# Patient Record
Sex: FEMALE | Race: WHITE | NOT HISPANIC OR LATINO | Employment: OTHER | URBAN - METROPOLITAN AREA
[De-identification: names, ages, dates, MRNs, and addresses within clinical notes are randomized per-mention and may not be internally consistent; named-entity substitution may affect disease eponyms.]

---

## 2017-01-03 ENCOUNTER — OFFICE VISIT (OUTPATIENT)
Dept: RHEUMATOLOGY | Facility: CLINIC | Age: 57
End: 2017-01-03
Payer: COMMERCIAL

## 2017-01-03 VITALS
WEIGHT: 149 LBS | SYSTOLIC BLOOD PRESSURE: 108 MMHG | HEART RATE: 75 BPM | DIASTOLIC BLOOD PRESSURE: 68 MMHG | BODY MASS INDEX: 27.25 KG/M2

## 2017-01-03 DIAGNOSIS — M05.742 RHEUMATOID ARTHRITIS INVOLVING BOTH HANDS WITH POSITIVE RHEUMATOID FACTOR (H): Primary | ICD-10-CM

## 2017-01-03 DIAGNOSIS — M05.741 RHEUMATOID ARTHRITIS INVOLVING BOTH HANDS WITH POSITIVE RHEUMATOID FACTOR (H): Primary | ICD-10-CM

## 2017-01-03 PROCEDURE — 99213 OFFICE O/P EST LOW 20 MIN: CPT | Performed by: INTERNAL MEDICINE

## 2017-01-03 NOTE — PROGRESS NOTES
"Chief Complaint   Patient presents with     RECHECK       Initial /68 mmHg  Pulse 75  Wt 67.586 kg (149 lb) Estimated body mass index is 27.25 kg/(m^2) as calculated from the following:    Height as of 12/20/16: 1.575 m (5' 2\").    Weight as of this encounter: 67.586 kg (149 lb).      Patient prefers to be contacted via at Home.   Okay to leave detailed message: Yes  Patient uses MyChart: Yes    Anita GRIGSBY LPN      "

## 2017-01-03 NOTE — PROGRESS NOTES
HISTORY:  Ms. Mae Connors is seen back in followup for her seropositive rheumatoid arthritis.  She is on methotrexate 20 mg weekly and denies any side effects of nausea, vomiting, diarrhea, or stomatitis.  There has not been any progressive dyspnea or chronic cough.  Overall, she feels better but admits she has not noticed much change in the stiffness, which is still present in the morning.  However, she admits that she does not hurt and that her hands do feel different and better.      We also discussed the fact she does have some osteoarthritis and differentiate this from rheumatoid arthritis as well as treatment approaches, etc.      PHYSICAL EXAMINATION:  Blood pressure 108/60, pulse 75, weight 149.  There may still be trace synovitis of the left wrist, but there is none of the right wrist nor the MCPs or PIPs.  She does clearly have some Heberden's nodules at the bilateral second, third and fifth DIP joints and Jo Ann's nodules at the bilateral third PIP joints.  There is no synovitis of the elbows, shoulders, knees or ankles.  Skin is without lesions.      IMPRESSION:   1.  Seropositive rheumatoid arthritis.   2.  Generalized osteoarthritis.      RECOMMENDATIONS:   1.  Continue methotrexate 20 mg weekly as I think she has had a good effect and I am not certain we can do much better.   2.  Continue folic acid.   3.  Check methotrexate labs today.   4.  Treatment for osteoarthritis is basically palliative and involves the use of Tylenol, NSAIDs, and analgesics.  Also discussed nutritional supplements that can be mildly helpful at times.   5.  Follow up with me in 3 months or sooner if there are problems.         TYLER MOLINA MD             D: 2017 13:03   T: 2017 13:24   MT: [id number]      Name:     MAE CONNORS   MRN:      [id number]        Account:      [id number]   :      1960           Visit Date:   2017      Document: [id number]

## 2017-01-03 NOTE — MR AVS SNAPSHOT
After Visit Summary   1/3/2017    Mae Connors    MRN: [id number]           Patient Information     Date Of Birth          1960        Visit Information        Provider Department      1/3/2017 8:15 AM Felipe Reyna MD [facility]        Today's Diagnoses     Rheumatoid arthritis involving both hands with positive rheumatoid factor (H)    -  1        Follow-ups after your visit        Follow-up notes from your care team     Return in about 3 months (around 4/3/2017).      Your next 10 appointments already scheduled     Apr 04, 2017  7:30 AM   Return Visit with Felipe Reyna MD   [facility] ([facility])    [address], [address]  [facility] [address] [phone]           Please inform patient of the clinic address: [facility] [address] , [address] [facility] [address]              Future tests that were ordered for you today     Open Standing Orders        Priority Remaining Interval Expires Ordered    Centromere Antibody IgG Routine 4/4 12 weeks 1/3/2018 1/3/2017    Comprehensive metabolic panel Routine 4/4 12 weeks 1/2/2018 1/3/2017            Who to contact     If you have questions or need follow up information about today's clinic visit or your schedule please contact [facility] directly at [phone].  Normal or non-critical lab and imaging results will be communicated to you by MyChart, letter or phone within 4 business days after the clinic has received the results. If you do not hear from us within 7 days, please contact the clinic through MyChart or phone. If you have a critical or abnormal lab result, we will notify you by phone as soon as possible.  Submit refill requests through Formlabs or call your pharmacy and they will forward the refill request to us. Please allow 3 business days for your refill to be completed.           Additional Information About Your Visit        MyChart Information     Circular gives you secure access to your electronic health record. If you see a primary care provider, you can also send messages to your care team and make appointments. If you have questions, please call your primary care clinic.  If you do not have a primary care provider, please call [phone] and they will assist you.        Care EveryWhere ID     This is your Care EveryWhere ID. This could be used by other organizations to access your [place] medical records  [id number]        Your Vitals Were     Pulse                   75            Blood Pressure from Last 3 Encounters:   01/03/17 108/68   12/20/16 100/70   08/18/16 116/71    Weight from Last 3 Encounters:   01/03/17 67.586 kg (149 lb)   12/20/16 67.132 kg (148 lb)   10/06/16 65.772 kg (145 lb)                 Today's Medication Changes          These changes are accurate as of: 1/3/17  3:13 PM.  If you have any questions, ask your nurse or doctor.               These medicines have changed or have updated prescriptions.        Dose/Directions    methotrexate 2.5 MG tablet CHEMO   This may have changed:  how much to take   Used for:  Rheumatoid arthritis involving both hands with positive rheumatoid factor (H)        Dose:  20 mg   Take 8 tablets (20 mg) by mouth once a week   Quantity:  40 tablet   Refills:  3            Where to get your medicines      These medications were sent to [id number] - [place], [place] - [address]  [address], [facility] [address]     Phone:  [phone]    - methotrexate 2.5 MG tablet CHEMO             Primary Care Provider Office Phone # Fax #    Yasmine Richards [phone]       [facility] [address] [place] [address]        Thank you!     Thank you for choosing [facility]  for your care. Our goal is always to provide you  with excellent care. Hearing back from our patients is one way we can continue to improve our services. Please take a few minutes to complete the written survey that you may receive in the mail after your visit with us. Thank you!             Your Updated Medication List - Protect others around you: Learn how to safely use, store and throw away your medicines at www.disposemymeds.org.          This list is accurate as of: 1/3/17  3:13 PM.  Always use your most recent med list.                   Brand Name Dispense Instructions for use    BUDESONIDE (NASAL) NA      Miles in nostril daily 1 VIAL IN BETH POT       buPROPion 150 MG 24 hr tablet    WELLBUTRIN XL    180 tablet    Take 1 tablet (150 mg) by mouth every morning       cetirizine 10 MG tablet    zyrTEC    90 tablet    Take 1 tablet by mouth every evening.       folic acid 1 MG tablet    FOLVITE    100 tablet    Take 1 tablet (1 mg) by mouth daily       levothyroxine 100 MCG tablet    LEVOTHROID    90 tablet    Take 1 tablet (100 mcg) by mouth daily       methotrexate 2.5 MG tablet CHEMO     40 tablet    Take 8 tablets (20 mg) by mouth once a week       naproxen 500 MG tablet    NAPROSYN    60 tablet    Take 1 tablet (500 mg) by mouth 2 times daily as needed for moderate pain

## 2017-02-09 DIAGNOSIS — M05.741 RHEUMATOID ARTHRITIS INVOLVING BOTH HANDS WITH POSITIVE RHEUMATOID FACTOR (H): ICD-10-CM

## 2017-02-09 DIAGNOSIS — E03.8 OTHER SPECIFIED HYPOTHYROIDISM: ICD-10-CM

## 2017-02-09 DIAGNOSIS — M05.742 RHEUMATOID ARTHRITIS INVOLVING BOTH HANDS WITH POSITIVE RHEUMATOID FACTOR (H): ICD-10-CM

## 2017-02-09 PROCEDURE — 84443 ASSAY THYROID STIM HORMONE: CPT | Performed by: INTERNAL MEDICINE

## 2017-02-09 PROCEDURE — 36415 COLL VENOUS BLD VENIPUNCTURE: CPT | Performed by: INTERNAL MEDICINE

## 2017-02-09 PROCEDURE — 84439 ASSAY OF FREE THYROXINE: CPT | Performed by: INTERNAL MEDICINE

## 2017-02-09 PROCEDURE — 80053 COMPREHEN METABOLIC PANEL: CPT | Performed by: INTERNAL MEDICINE

## 2017-02-09 PROCEDURE — 86235 NUCLEAR ANTIGEN ANTIBODY: CPT | Performed by: INTERNAL MEDICINE

## 2017-02-09 NOTE — LETTER
[facility]  [address]  [place] [address]  [phone]      June 22, 2017      Mae Connors  [address] [address]              Dear Mae Connors    This is to remind you that your TSH is due. You may call our office at [phone] to schedule a lab only appointment.    Please disregard this notice if you have had this lab work done or already made an appointment.     Note: If you are scheduled for a cholesterol or any diabetic lab tests, you will need to fast for 12 hours prior to your lab appointment.        Sincerely,    Yasmine Richards MD

## 2017-02-10 LAB
ALBUMIN SERPL-MCNC: 4.1 G/DL (ref 3.4–5)
ALP SERPL-CCNC: 67 U/L (ref 40–150)
ALT SERPL W P-5'-P-CCNC: 39 U/L (ref 0–50)
ANION GAP SERPL CALCULATED.3IONS-SCNC: 8 MMOL/L (ref 3–14)
AST SERPL W P-5'-P-CCNC: 27 U/L (ref 0–45)
BILIRUB SERPL-MCNC: 0.3 MG/DL (ref 0.2–1.3)
BUN SERPL-MCNC: 17 MG/DL (ref 7–30)
CALCIUM SERPL-MCNC: 9.3 MG/DL (ref 8.5–10.1)
CHLORIDE SERPL-SCNC: 106 MMOL/L (ref 94–109)
CO2 SERPL-SCNC: 29 MMOL/L (ref 20–32)
CREAT SERPL-MCNC: 0.85 MG/DL (ref 0.52–1.04)
GFR SERPL CREATININE-BSD FRML MDRD: 69 ML/MIN/1.7M2
GLUCOSE SERPL-MCNC: 79 MG/DL (ref 70–99)
POTASSIUM SERPL-SCNC: 3.7 MMOL/L (ref 3.4–5.3)
PROT SERPL-MCNC: 7.8 G/DL (ref 6.8–8.8)
SODIUM SERPL-SCNC: 143 MMOL/L (ref 133–144)
T4 FREE SERPL-MCNC: 1.18 NG/DL (ref 0.76–1.46)
TSH SERPL DL<=0.005 MIU/L-ACNC: 0.05 MU/L (ref 0.4–4)

## 2017-02-10 RX ORDER — LEVOTHYROXINE SODIUM 88 UG/1
88 TABLET ORAL DAILY
Qty: 30 TABLET | Refills: 1 | Status: SHIPPED | OUTPATIENT
Start: 2017-02-10 | End: 2017-07-10

## 2017-02-13 LAB — CENTROMERE IGG SER-ACNC: NORMAL AI (ref 0–0.9)

## 2017-04-04 ENCOUNTER — OFFICE VISIT (OUTPATIENT)
Dept: RHEUMATOLOGY | Facility: CLINIC | Age: 57
End: 2017-04-04
Payer: COMMERCIAL

## 2017-04-04 VITALS — WEIGHT: 151 LBS | HEART RATE: 83 BPM | BODY MASS INDEX: 27.62 KG/M2

## 2017-04-04 DIAGNOSIS — M05.741 RHEUMATOID ARTHRITIS INVOLVING BOTH HANDS WITH POSITIVE RHEUMATOID FACTOR (H): ICD-10-CM

## 2017-04-04 DIAGNOSIS — M05.742 RHEUMATOID ARTHRITIS INVOLVING BOTH HANDS WITH POSITIVE RHEUMATOID FACTOR (H): ICD-10-CM

## 2017-04-04 PROCEDURE — 99213 OFFICE O/P EST LOW 20 MIN: CPT | Performed by: INTERNAL MEDICINE

## 2017-04-04 NOTE — NURSING NOTE
"Chief Complaint   Patient presents with     RECHECK   states she is doing well.    Initial Pulse 83  Wt 68.5 kg (151 lb)  BMI 27.62 kg/m2 Estimated body mass index is 27.62 kg/(m^2) as calculated from the following:    Height as of 12/20/16: 1.575 m (5' 2\").    Weight as of this encounter: 68.5 kg (151 lb).      Patient prefers to be contacted via at Home.   Okay to leave detailed message: Yes  Patient uses MyChart: Yes    Anita GRIGSBY LPN    "

## 2017-04-04 NOTE — PROGRESS NOTES
HISTORY:  Ms. Bharat Connors is seen back in follow up for her rheumatoid arthritis.  She is doing well.  She denies any side effects of methotrexate such as nausea, vomiting, diarrhea, stomatitis.  There is no progressive dyspnea or chronic cough.  She is just a little bit stiff in her hands throughout the day, but overall thinks things are going well.  At this point, I do not think that anything else needs to be changed or done differently at this point.        PHYSICAL EXAMINATION:    VITAL SIGNS:  Pulse is 83, weight is 151.     LUNGS:  Clear.    HEART:  Regular.     JOINT EXAM:  There still may be some mild synovitis of the wrist that she mentions; she is having intermittent numbness in her right hand.  There is no synovitis of the MCPs or PIPs.     SKIN:  Without lesions.       IMPRESSION:  Seropositive rheumatoid arthritis.        RECOMMENDATIONS:    1.  If the numbness in the hands gets worse to let me know.     2.  Continue methotrexate 20 mg weekly.    3.  Continue folic acid.    4.  Follow up with me in 3 months and check labs at that time.         TYLER MOLINA MD             D: 2017 12:48   T: 2017 13:05   MT: ALO#[id number]      Name:     BHARAT CONNORS   MRN:      [id number]        Account:      [id number]   :      1960           Visit Date:   2017      Document: [id number]

## 2017-04-04 NOTE — MR AVS SNAPSHOT
After Visit Summary   4/4/2017    Mae Connors    MRN: [id number]           Patient Information     Date Of Birth          1960        Visit Information        Provider Department      4/4/2017 7:30 AM Felipe Reyna MD [facility]        Today's Diagnoses     Rheumatoid arthritis involving both hands with positive rheumatoid factor (H)           Follow-ups after your visit        Your next 10 appointments already scheduled     Jul 11, 2017  7:30 AM CDT   Return Visit with Felipe Reyna MD   [facility] ([facility])    [address] [facility] [address]  [facility] [id number] [phone]           Please inform patient of the clinic address: [facility] [address] , [facility] [address] [facility] [address]              Future tests that were ordered for you today     Open Standing Orders        Priority Remaining Interval Expires Ordered    CBC with platelets differential Routine 4/4 12 weeks 4/4/2018 4/4/2017    Comprehensive metabolic panel Routine 4/4 12 weeks 4/4/2018 4/4/2017            Who to contact     If you have questions or need follow up information about today's clinic visit or your schedule please contact [facility] directly at [phone].  Normal or non-critical lab and imaging results will be communicated to you by MyChart, letter or phone within 4 business days after the clinic has received the results. If you do not hear from us within 7 days, please contact the clinic through MyChart or phone. If you have a critical or abnormal lab result, we will notify you by phone as soon as possible.  Submit refill requests through Spreedly or call your pharmacy and they will forward the refill request to us. Please allow 3 business days for your refill to be completed.          Additional Information About Your Visit        MyChart Information      Shippter gives you secure access to your electronic health record. If you see a primary care provider, you can also send messages to your care team and make appointments. If you have questions, please call your primary care clinic.  If you do not have a primary care provider, please call [phone] and they will assist you.        Care EveryWhere ID     This is your Care EveryWhere ID. This could be used by other organizations to access your [place] medical records  [id number]        Your Vitals Were     Pulse BMI (Body Mass Index)                83 27.62 kg/m2           Blood Pressure from Last 3 Encounters:   01/03/17 108/68   12/20/16 100/70   08/18/16 116/71    Weight from Last 3 Encounters:   04/04/17 68.5 kg (151 lb)   01/03/17 67.6 kg (149 lb)   12/20/16 67.1 kg (148 lb)                 Where to get your medicines      These medications were sent to [id number] - [facility] [address]  [address], [facility] [address]     Phone:  [phone]     methotrexate 2.5 MG tablet CHEMO          Primary Care Provider Office Phone # Fax #    Yasmine Richards [phone]       [address] [place] [address]        Thank you!     Thank you for choosing [facility]  for your care. Our goal is always to provide you with excellent care. Hearing back from our patients is one way we can continue to improve our services. Please take a few minutes to complete the written survey that you may receive in the mail after your visit with us. Thank you!             Your Updated Medication List - Protect others around you: Learn how to safely use, store and throw away your medicines at www.disposemymeds.org.          This list is accurate as of: 4/4/17  8:46 AM.  Always use your most recent med list.                   Brand Name Dispense Instructions for use    BUDESONIDE (NASAL) NA       Spray in nostril daily 1 VIAL IN BETH POT       buPROPion 150 MG 24 hr tablet    WELLBUTRIN XL    180 tablet    Take 1 tablet (150 mg) by mouth every morning       cetirizine 10 MG tablet    zyrTEC    90 tablet    Take 1 tablet by mouth every evening.       folic acid 1 MG tablet    FOLVITE    100 tablet    Take 1 tablet (1 mg) by mouth daily       levothyroxine 88 MCG tablet    SYNTHROID/LEVOTHROID    30 tablet    Take 1 tablet (88 mcg) by mouth daily       methotrexate 2.5 MG tablet CHEMO     40 tablet    Take 8 tablets (20 mg) by mouth once a week       naproxen 500 MG tablet    NAPROSYN    60 tablet    Take 1 tablet (500 mg) by mouth 2 times daily as needed for moderate pain

## 2017-06-30 DIAGNOSIS — M05.741 RHEUMATOID ARTHRITIS INVOLVING BOTH HANDS WITH POSITIVE RHEUMATOID FACTOR (H): ICD-10-CM

## 2017-06-30 DIAGNOSIS — M05.742 RHEUMATOID ARTHRITIS INVOLVING BOTH HANDS WITH POSITIVE RHEUMATOID FACTOR (H): ICD-10-CM

## 2017-06-30 DIAGNOSIS — E03.8 OTHER SPECIFIED HYPOTHYROIDISM: ICD-10-CM

## 2017-06-30 LAB
ALBUMIN SERPL-MCNC: 3.8 G/DL (ref 3.4–5)
ALP SERPL-CCNC: 71 U/L (ref 40–150)
ALT SERPL W P-5'-P-CCNC: 22 U/L (ref 0–50)
ANION GAP SERPL CALCULATED.3IONS-SCNC: 5 MMOL/L (ref 3–14)
AST SERPL W P-5'-P-CCNC: 16 U/L (ref 0–45)
BASOPHILS # BLD AUTO: 0 10E9/L (ref 0–0.2)
BASOPHILS NFR BLD AUTO: 0.5 %
BILIRUB SERPL-MCNC: 0.4 MG/DL (ref 0.2–1.3)
BUN SERPL-MCNC: 16 MG/DL (ref 7–30)
CALCIUM SERPL-MCNC: 9.2 MG/DL (ref 8.5–10.1)
CHLORIDE SERPL-SCNC: 109 MMOL/L (ref 94–109)
CO2 SERPL-SCNC: 28 MMOL/L (ref 20–32)
CREAT SERPL-MCNC: 0.9 MG/DL (ref 0.52–1.04)
DIFFERENTIAL METHOD BLD: ABNORMAL
EOSINOPHIL # BLD AUTO: 0.1 10E9/L (ref 0–0.7)
EOSINOPHIL NFR BLD AUTO: 3 %
ERYTHROCYTE [DISTWIDTH] IN BLOOD BY AUTOMATED COUNT: 13.6 % (ref 10–15)
GFR SERPL CREATININE-BSD FRML MDRD: 65 ML/MIN/1.7M2
GLUCOSE SERPL-MCNC: 58 MG/DL (ref 70–99)
HCT VFR BLD AUTO: 35.5 % (ref 35–47)
HGB BLD-MCNC: 11.7 G/DL (ref 11.7–15.7)
LYMPHOCYTES # BLD AUTO: 1.3 10E9/L (ref 0.8–5.3)
LYMPHOCYTES NFR BLD AUTO: 36.2 %
MCH RBC QN AUTO: 30 PG (ref 26.5–33)
MCHC RBC AUTO-ENTMCNC: 33 G/DL (ref 31.5–36.5)
MCV RBC AUTO: 91 FL (ref 78–100)
MONOCYTES # BLD AUTO: 0.3 10E9/L (ref 0–1.3)
MONOCYTES NFR BLD AUTO: 7.6 %
NEUTROPHILS # BLD AUTO: 2 10E9/L (ref 1.6–8.3)
NEUTROPHILS NFR BLD AUTO: 52.7 %
PLATELET # BLD AUTO: 282 10E9/L (ref 150–450)
POTASSIUM SERPL-SCNC: 4 MMOL/L (ref 3.4–5.3)
PROT SERPL-MCNC: 7.5 G/DL (ref 6.8–8.8)
RBC # BLD AUTO: 3.9 10E12/L (ref 3.8–5.2)
SODIUM SERPL-SCNC: 142 MMOL/L (ref 133–144)
T4 FREE SERPL-MCNC: 1.41 NG/DL (ref 0.76–1.46)
TSH SERPL DL<=0.005 MIU/L-ACNC: 0.03 MU/L (ref 0.4–4)
WBC # BLD AUTO: 3.7 10E9/L (ref 4–11)

## 2017-06-30 PROCEDURE — 80050 GENERAL HEALTH PANEL: CPT | Performed by: INTERNAL MEDICINE

## 2017-06-30 PROCEDURE — 86235 NUCLEAR ANTIGEN ANTIBODY: CPT | Performed by: INTERNAL MEDICINE

## 2017-06-30 PROCEDURE — 84439 ASSAY OF FREE THYROXINE: CPT | Performed by: INTERNAL MEDICINE

## 2017-06-30 PROCEDURE — 36415 COLL VENOUS BLD VENIPUNCTURE: CPT | Performed by: INTERNAL MEDICINE

## 2017-07-03 LAB — CENTROMERE IGG SER-ACNC: NORMAL AI (ref 0–0.9)

## 2017-07-11 ENCOUNTER — OFFICE VISIT (OUTPATIENT)
Dept: RHEUMATOLOGY | Facility: CLINIC | Age: 57
End: 2017-07-11
Payer: COMMERCIAL

## 2017-07-11 VITALS — DIASTOLIC BLOOD PRESSURE: 62 MMHG | SYSTOLIC BLOOD PRESSURE: 117 MMHG | HEART RATE: 96 BPM

## 2017-07-11 DIAGNOSIS — M05.741 RHEUMATOID ARTHRITIS INVOLVING BOTH HANDS WITH POSITIVE RHEUMATOID FACTOR (H): ICD-10-CM

## 2017-07-11 DIAGNOSIS — M05.742 RHEUMATOID ARTHRITIS INVOLVING BOTH HANDS WITH POSITIVE RHEUMATOID FACTOR (H): ICD-10-CM

## 2017-07-11 PROCEDURE — 99213 OFFICE O/P EST LOW 20 MIN: CPT | Performed by: INTERNAL MEDICINE

## 2017-07-11 NOTE — MR AVS SNAPSHOT
After Visit Summary   7/11/2017    Mae Connors    MRN: [id number]           Patient Information     Date Of Birth          1960        Visit Information        Provider Department      7/11/2017 7:30 AM Felipe Reyna MD [facility]        Today's Diagnoses     Rheumatoid arthritis involving both hands with positive rheumatoid factor (H)           Follow-ups after your visit        Your next 10 appointments already scheduled     Oct 10, 2017  7:30 AM CDT   Return Visit with Felipe Reyna MD   [facility] ([facility])    [address] [address] [id number] [phone]           Please inform patient of the clinic address: [facility] [address] , [address] [address]              Who to contact     If you have questions or need follow up information about today's clinic visit or your schedule please contact [facility] directly at [phone].  Normal or non-critical lab and imaging results will be communicated to you by MyChart, letter or phone within 4 business days after the clinic has received the results. If you do not hear from us within 7 days, please contact the clinic through Mimubhart or phone. If you have a critical or abnormal lab result, we will notify you by phone as soon as possible.  Submit refill requests through [employer] or call your pharmacy and they will forward the refill request to us. Please allow 3 business days for your refill to be completed.          Additional Information About Your Visit        Mimubhart Information     [employer] gives you secure access to your electronic health record. If you see a primary care provider, you can also send messages to your care team and make appointments. If you have questions, please call your primary care clinic.  If you do not have a primary care provider,  please call [phone] and they will assist you.        Care EveryWhere ID     This is your Care EveryWhere ID. This could be used by other organizations to access your [place] medical records  [id number]        Your Vitals Were     Pulse                   96            Blood Pressure from Last 3 Encounters:   07/11/17 117/62   01/03/17 108/68   12/20/16 100/70    Weight from Last 3 Encounters:   04/04/17 68.5 kg (151 lb)   01/03/17 67.6 kg (149 lb)   12/20/16 67.1 kg (148 lb)              Today, you had the following     No orders found for display         Where to get your medicines      These medications were sent to [id number] - [facility] [address]  [address], [facility] [address]     Phone:  [phone]     methotrexate 2.5 MG tablet CHEMO          Primary Care Provider Office Phone # Fax #    Yasmine Richards [phone]       [facility] [address] [place] [address]        Equal Access to Services     [facility] AH: Hadii aad ku hadasho Soomaali, waaxda luqadaha, qaybta kaalmada adeegyada, geovany damico haymt tom . So [facility] [phone].    ATENCIÓN: Si habla español, tiene a grant disposición servicios gratuitos de asistencia lingüística. [facility] [phone].    We comply with applicable federal civil rights laws and [place] laws. We do not discriminate on the basis of race, color, national origin, age, disability sex, sexual orientation or gender identity.            Thank you!     Thank you for choosing [facility]  for your care. Our goal is always to provide you with excellent care. Hearing back from our patients is one way we can continue to improve our services. Please take a few minutes to complete the written survey that you may receive in the mail after your visit with us. Thank you!             Your Updated Medication List - Protect  others around you: Learn how to safely use, store and throw away your medicines at www.disposemymeds.org.          This list is accurate as of: 7/11/17  8:35 AM.  Always use your most recent med list.                   Brand Name Dispense Instructions for use Diagnosis    BUDESONIDE (NASAL) NA      [place] in nostril daily 1 VIAL IN BETH POT        buPROPion 150 MG 24 hr tablet    WELLBUTRIN XL    180 tablet    Take 1 tablet (150 mg) by mouth every morning    Major depressive disorder, recurrent episode, mild (H)       cetirizine 10 MG tablet    zyrTEC    90 tablet    Take 1 tablet by mouth every evening.    Allergic rhinitis, cause unspecified       folic acid 1 MG tablet    FOLVITE    100 tablet    Take 1 tablet (1 mg) by mouth daily    Rheumatoid arthritis involving both hands with positive rheumatoid factor (H)       levothyroxine 88 MCG tablet    SYNTHROID/LEVOTHROID    30 tablet    Take 1 tablet (88 mcg) by mouth daily    Other specified hypothyroidism       methotrexate 2.5 MG tablet CHEMO     40 tablet    Take 8 tablets (20 mg) by mouth once a week    Rheumatoid arthritis involving both hands with positive rheumatoid factor (H)       naproxen 500 MG tablet    NAPROSYN    60 tablet    Take 1 tablet (500 mg) by mouth 2 times daily as needed for moderate pain    Pain of left hand

## 2017-07-11 NOTE — NURSING NOTE
"Chief Complaint   Patient presents with     RECHECK       Initial /62  Pulse 96 Estimated body mass index is 27.62 kg/(m^2) as calculated from the following:    Height as of 12/20/16: 1.575 m (5' 2\").    Weight as of 4/4/17: 68.5 kg (151 lb).      Patient prefers to be contacted via at Home.   Okay to leave detailed message: Yes  Patient uses MyChart: Yes    Anita GRIGSBY LPN    "

## 2017-07-13 NOTE — PROGRESS NOTES
Mae is seen back in follow up for her rheumatoid arthritis.  She is doing well and has no complaints.  She denies any side effects from the methotrexate such as nausea, vomiting, diarrhea, stomatitis.  There is no progressive dyspnea or chronic cough.  She is not having any issues with her arthritis overall and basically feels almost normal.  No significant stiffness, pain or swelling.      PHYSICAL EXAM:    Vital signs:  Blood pressure 117/62, pulse 96.  Lungs:  Clear to auscultation bilaterally.    Heart:  Regular rate and rhythm without murmur.  Joint:  There is no soft tissue swelling, erythema or tenderness of the wrists, MCPs or PIPs.  There is no synovitis of the elbows, shoulders, knees, ankles.    Skin:  Without lesion.    IMPRESSION:  Seropositive rheumatoid arthritis doing well.     RECOMMENDATION:    1.  Continue methotrexate 20 mg weekly.   2.  Continue folic acid 1 mg daily.  3.  Follow up with me in approximately 3 months and check labs prior to that follow up.        Felipe Reyna MD    D:  07/11/2017 09:32 T:  07/13/2017 09:17  Document:  [id number] RH\AD\LUCIANO

## 2017-07-30 ENCOUNTER — RADIANT APPOINTMENT (OUTPATIENT)
Dept: GENERAL RADIOLOGY | Facility: CLINIC | Age: 57
End: 2017-07-30
Attending: FAMILY MEDICINE
Payer: COMMERCIAL

## 2017-07-30 ENCOUNTER — OFFICE VISIT (OUTPATIENT)
Dept: URGENT CARE | Facility: URGENT CARE | Age: 57
End: 2017-07-30
Payer: COMMERCIAL

## 2017-07-30 VITALS
SYSTOLIC BLOOD PRESSURE: 108 MMHG | OXYGEN SATURATION: 96 % | TEMPERATURE: 99.9 F | DIASTOLIC BLOOD PRESSURE: 60 MMHG | WEIGHT: 155 LBS | HEART RATE: 78 BPM | BODY MASS INDEX: 28.52 KG/M2 | HEIGHT: 62 IN

## 2017-07-30 DIAGNOSIS — R11.2 NAUSEA AND VOMITING, INTRACTABILITY OF VOMITING NOT SPECIFIED, UNSPECIFIED VOMITING TYPE: ICD-10-CM

## 2017-07-30 DIAGNOSIS — R11.2 NAUSEA AND VOMITING, INTRACTABILITY OF VOMITING NOT SPECIFIED, UNSPECIFIED VOMITING TYPE: Primary | ICD-10-CM

## 2017-07-30 DIAGNOSIS — R31.29 MICROSCOPIC HEMATURIA: ICD-10-CM

## 2017-07-30 LAB
ALBUMIN SERPL-MCNC: 3.6 G/DL (ref 3.4–5)
ALBUMIN UR-MCNC: 100 MG/DL
ALP SERPL-CCNC: 82 U/L (ref 40–150)
ALT SERPL W P-5'-P-CCNC: 58 U/L (ref 0–50)
ANION GAP SERPL CALCULATED.3IONS-SCNC: 5 MMOL/L (ref 3–14)
APPEARANCE UR: CLEAR
AST SERPL W P-5'-P-CCNC: 49 U/L (ref 0–45)
BACTERIA #/AREA URNS HPF: ABNORMAL /HPF
BASOPHILS # BLD AUTO: 0 10E9/L (ref 0–0.2)
BASOPHILS NFR BLD AUTO: 0.3 %
BILIRUB SERPL-MCNC: 0.6 MG/DL (ref 0.2–1.3)
BILIRUB UR QL STRIP: ABNORMAL
BUN SERPL-MCNC: 10 MG/DL (ref 7–30)
CALCIUM SERPL-MCNC: 9.5 MG/DL (ref 8.5–10.1)
CHLORIDE SERPL-SCNC: 96 MMOL/L (ref 94–109)
CO2 SERPL-SCNC: 29 MMOL/L (ref 20–32)
COLOR UR AUTO: YELLOW
CREAT SERPL-MCNC: 0.9 MG/DL (ref 0.52–1.04)
DIFFERENTIAL METHOD BLD: ABNORMAL
EOSINOPHIL # BLD AUTO: 0 10E9/L (ref 0–0.7)
EOSINOPHIL NFR BLD AUTO: 0.3 %
ERYTHROCYTE [DISTWIDTH] IN BLOOD BY AUTOMATED COUNT: 13.4 % (ref 10–15)
GFR SERPL CREATININE-BSD FRML MDRD: 65 ML/MIN/1.7M2
GLUCOSE SERPL-MCNC: 121 MG/DL (ref 70–99)
GLUCOSE UR STRIP-MCNC: NEGATIVE MG/DL
HCT VFR BLD AUTO: 38.6 % (ref 35–47)
HGB BLD-MCNC: 13.1 G/DL (ref 11.7–15.7)
HGB UR QL STRIP: ABNORMAL
KETONES UR STRIP-MCNC: ABNORMAL MG/DL
LEUKOCYTE ESTERASE UR QL STRIP: NEGATIVE
LYMPHOCYTES # BLD AUTO: 0.5 10E9/L (ref 0.8–5.3)
LYMPHOCYTES NFR BLD AUTO: 13.6 %
MCH RBC QN AUTO: 29.7 PG (ref 26.5–33)
MCHC RBC AUTO-ENTMCNC: 33.9 G/DL (ref 31.5–36.5)
MCV RBC AUTO: 88 FL (ref 78–100)
MONOCYTES # BLD AUTO: 0.4 10E9/L (ref 0–1.3)
MONOCYTES NFR BLD AUTO: 9.9 %
MUCOUS THREADS #/AREA URNS LPF: PRESENT /LPF
NEUTROPHILS # BLD AUTO: 2.9 10E9/L (ref 1.6–8.3)
NEUTROPHILS NFR BLD AUTO: 75.9 %
NITRATE UR QL: NEGATIVE
NON-SQ EPI CELLS #/AREA URNS LPF: ABNORMAL /LPF
PH UR STRIP: 5.5 PH (ref 5–7)
PLATELET # BLD AUTO: 211 10E9/L (ref 150–450)
POTASSIUM SERPL-SCNC: 4 MMOL/L (ref 3.4–5.3)
PROT SERPL-MCNC: 7.5 G/DL (ref 6.8–8.8)
RBC # BLD AUTO: 4.41 10E12/L (ref 3.8–5.2)
RBC #/AREA URNS AUTO: ABNORMAL /HPF (ref 0–2)
SODIUM SERPL-SCNC: 130 MMOL/L (ref 133–144)
SP GR UR STRIP: >1.03 (ref 1–1.03)
URN SPEC COLLECT METH UR: ABNORMAL
UROBILINOGEN UR STRIP-ACNC: 0.2 EU/DL (ref 0.2–1)
WBC # BLD AUTO: 3.8 10E9/L (ref 4–11)
WBC #/AREA URNS AUTO: ABNORMAL /HPF (ref 0–2)

## 2017-07-30 PROCEDURE — 86618 LYME DISEASE ANTIBODY: CPT | Performed by: FAMILY MEDICINE

## 2017-07-30 PROCEDURE — 74020 XR ABDOMEN 2 VW: CPT

## 2017-07-30 PROCEDURE — 36415 COLL VENOUS BLD VENIPUNCTURE: CPT | Performed by: FAMILY MEDICINE

## 2017-07-30 PROCEDURE — 99214 OFFICE O/P EST MOD 30 MIN: CPT | Performed by: FAMILY MEDICINE

## 2017-07-30 PROCEDURE — 87086 URINE CULTURE/COLONY COUNT: CPT | Performed by: FAMILY MEDICINE

## 2017-07-30 PROCEDURE — 85025 COMPLETE CBC W/AUTO DIFF WBC: CPT | Performed by: FAMILY MEDICINE

## 2017-07-30 PROCEDURE — 81001 URINALYSIS AUTO W/SCOPE: CPT | Performed by: FAMILY MEDICINE

## 2017-07-30 PROCEDURE — 80053 COMPREHEN METABOLIC PANEL: CPT | Performed by: FAMILY MEDICINE

## 2017-07-30 RX ORDER — ONDANSETRON 4 MG/1
4 TABLET, ORALLY DISINTEGRATING ORAL EVERY 8 HOURS PRN
Qty: 12 TABLET | Refills: 0 | Status: SHIPPED | OUTPATIENT
Start: 2017-07-30 | End: 2017-10-10

## 2017-07-30 RX ORDER — ONDANSETRON 4 MG/1
4 TABLET, ORALLY DISINTEGRATING ORAL ONCE
Qty: 1 TABLET | Refills: 0
Start: 2017-07-30 | End: 2017-07-30

## 2017-07-30 NOTE — MR AVS SNAPSHOT
After Visit Summary   7/30/2017    Mae Connors    MRN: [id number]           Patient Information     Date Of Birth          1960        Visit Information        Provider Department      7/30/2017 6:55 PM Brock Vasquez MD [facility]        Today's Diagnoses     Nausea and vomiting, intractability of vomiting not specified, unspecified vomiting type    -  1    Microscopic hematuria          Care Instructions    Okay for tylenol for discomfort.  Okay for zofran for nausea.    We will contact you if the urine culture is positive for infection.      Possible Gallstone with Biliary Colic (Presumed)    Your abdominal pain is may be due to spasm of the gallbladder. This is called gallbladder or biliary colic. The gallbladder is a small sac under the liver, which stores and releases bile. Bile is a fluid that aids in the digestion of fat. Eating fatty food stimulates the gallbladder to contract, and release the bile. A gallstone may form in this sac. Although most people do not have symptoms, when the stone moves and blocks the passage of bile out of the bladder, it can cause pain and even an infection.  To be more certain of the diagnosis, you may need to have an ultrasound, CT-scan or other special test.  A number of things increase the risk for developing gallstones:    Women are more likely    Obesity    Increasing age    Losing or gaining weight quickly    High calorie diet    Pregnancy    Hormone therapy    Diabetes  The most common symptoms are:    Abdominal pain, cramping, aching    Nausea, vomiting    Fever  Many illnesses can cause these symptoms. This pain usually starts in the upper right side of your abdomen. Sometimes it can radiate to your right shoulder, back and arm. It usually starts suddenly, becomes more intense quickly, and then gradually decreases and disappears over a couple of hours. Elderly people and diabetics may have difficulty showing where the pain is  exactly.  Home care    Rest in bed and follow a clear liquid diet until feeling better. If pain or nausea medicine was given to help with your symptoms, take these as directed.    You can take acetaminophen or ibuprofen for pain, unless you were given a different pain medicine to use.Note: If you have chronic liver or kidney disease or ever had a stomach ulcer or GI bleeding or are taking blood thinner medications, talk with your doctor before using these medicines.    Fat in your diet makes the gallbladder contract and may cause increased pain. Therefore, avoid fat in your diet over the next two days and follow a low-fat diet after that. If you are overweight, a low fat diet will help you lose weight.  Follow-up care  If a test was already scheduled for you, keep this appointment. Be sure you know how to prepare yourself for the test. Usually, you will be asked not to eat or drink anything for at least 8 hours before the test. Schedule an appointment with your own doctor after your test is complete to discuss the findings.  When to seek medical advice  Call your healthcare provider if any of the following occur:    Pain gets worse or moves to the right lower abdomen    Repeated vomiting    Swelling of the abdomen    Pain lasts over 6 hours    Fever of 100.4  F (38  C) or higher, or as directed by your healthcare provider    Weakness, dizziness    Dark urine or light colored stools    Yellow color of the skin or eyes    Chest, arm, back, neck or jaw pain  Call 911  Get emergency medical care right away if any of these occur:    Trouble breathing    Very confused    Very drowsy or trouble awakening    Fainting or loss of consciousness    Rapid heart rate  Date Last Reviewed: 8/23/2015 2000-2017 [employer]. [address] [place] [address], [place] [address]. All rights reserved. This information is not intended as a substitute for professional medical care. Always follow your healthcare professional's  instructions.        Hematuria: Possible Causes     Many things can lead to blood in the urine (hematuria). The blood may be seen with the eye (macroscopic or gross hematuria). Or it may only be seen when the urine is looked at under a microscope (microscopic hematuria). Some of the most common causes of blood in the urine are listed below. Often, no cause for the blood can be found. This is called idiopathic hematuria.    Kidney or bladder stones are collections of crystals. They form in the urine. Stones may be found anywhere in the urinary tract. But they form most often in the kidneys or bladder. In addition to blood in the urine, they can cause severe pain.    BPH stands for benign prostatic hyperplasia. It is enlargement of the prostate gland. It happens as men age. BPH often causes problems with urination. It sometimes causes blood in the urine.    A urinary tract infection (UTI) is due to bacteria growing in the urinary tract. It can cause blood in the urine. Other symptoms include burning or pain with urination. You may need to urinate often or urgently. You may also have a fever.    Damage to the urinary tract may cause blood in the urine. This damage may be due to a blow or accident. It may also result from the use of a urinary catheter. Very hard exercise may sometimes irritate the urinary tract and cause bleeding.    Cancer may occur anywhere in the urinary tract. A tumor may sometimes cause no symptoms other than bleeding.     Other possible causes of bleeding include:    Prostatitis (infection of the prostate gland)    Taking anticoagulants    Blockage in the urinary tract    Disease or inflammation of the kidney    Cystic diseases of the kidneys    Sickle cell anemia    Vigorous exercise    Endometriosis  Date Last Reviewed: 12/1/2016 2000-2017 [employer]. [address] [place] [address], [place] [address]. All rights reserved. This information is not intended as a substitute for  "professional medical care. Always follow your healthcare professional's instructions.         * VOMITING [6yr-Adult]  Vomiting is a common symptom that may be due to different causes. These include gastroenteritis (\"stomach-flu\"), food poisoning and gastritis. There are other more serious causes of vomiting which may be hard to diagnose early in the illness. Therefore, it is important to watch for the warning signs listed below.  The main danger from repeated vomiting is \"dehydration\". This is due to excess loss of water and minerals from the body. When this occurs, body fluids must be replaced.`  HOME CARE:    If symptoms are severe, rest at home for the next 24 hours.    You may use acetaminophen (Tylenol) 650-1000 mg every 6 hours to control fever, unless another medicine was prescribed. [ NOTE : If you have chronic liver disease, talk with your doctor before using acetaminophen.] (Aspirin should never be used in anyone under 18 years of age who is ill with a fever. It may cause severe liver damage.)    Avoid tobacco and alcohol use, which may worsen your symptoms.    If medicines for vomiting were prescribed, take as directed.  DURING THE FIRST 12-24 HOURS follow the diet below. Try to take frequent small sips even if you vomit occasionally:    FRUIT JUICES: Apple, grape juice, clear fruit drinks, electrolyte replacement and sports drinks.    BEVERAGES: Sport drinks such as Gatorade, soft drinks without caffeine; mineral water (plain or flavored), decaffeinated tea and coffee.    SOUPS: Clear broth, consommé and bouillon    DESSERTS: Plain gelatin (Jell-O), popsicles and fruit juice bars.  DURING THE NEXT 24 HOURS you may add the following to the above:    Hot cereal, plain toast, bread, rolls, crackers    Plain noodles, rice, mashed potatoes, chicken noodle or rice soup    Unsweetened canned fruit (avoid pineapple), bananas    Avoid dairy products    Limit caffeine and chocolate. No spices or seasonings except " salt.  DURING THE NEXT 24 HOURS  Slowly go back to a normal diet, as you feel better and your symptoms lessen.  FOLLOW UP with your doctor as advised if you are not improving over the next 2-3 days.  GET PROMPT MEDICAL ATTENTION if any of the following occur:    Constant abdominal pain that stays in the same spot or gets worse    Continued vomiting (unable to keep liquids down) for 24 hours    Frequent diarrhea (more than 5 times a day); blood (red or black color) in diarrhea    No urine output for 12 hours or extreme thirst    Weakness, dizziness or fainting    Unusually drowsy or confused    Fever over 101.0  F (38.3  C) for more than 3 days    Yellow color of the eyes or skin    1126-6754 [facility], [address] [place] [address], [place] [address]. All rights reserved. This information is not intended as a substitute for professional medical care. Always follow your healthcare professional's instructions.            Follow-ups after your visit        Your next 10 appointments already scheduled     Oct 10, 2017  7:30 AM CDT   Return Visit with Felipe Reyna MD   [facility] ([facility])    [id number] [place] [id number] [phone]           Please inform patient of the clinic address: [facility] [address] , [place] [address]              Who to contact     If you have questions or need follow up information about today's clinic visit or your schedule please contact [facility] directly at [phone].  Normal or non-critical lab and imaging results will be communicated to you by MyChart, letter or phone within 4 business days after the clinic has received the results. If you do not hear from us within 7 days, please contact the clinic through MyChart or phone. If you have a critical or abnormal lab result, we will notify you by phone as soon as  "possible.  Submit refill requests through [employer] or call your pharmacy and they will forward the refill request to us. Please allow 3 business days for your refill to be completed.          Additional Information About Your Visit        [facility] Information     [employer] gives you secure access to your electronic health record. If you see a primary care provider, you can also send messages to your care team and make appointments. If you have questions, please call your primary care clinic.  If you do not have a primary care provider, please call [phone] and they will assist you.        Care EveryWhere ID     This is your Care EveryWhere ID. This could be used by other organizations to access your [facility] medical records  [id number]        Your Vitals Were     Pulse Temperature Height Pulse Oximetry BMI (Body Mass Index)       78 99.9  F (37.7  C) (Oral) 5' 2\" (1.575 m) 96% 28.35 kg/m2        Blood Pressure from Last 3 Encounters:   07/30/17 108/60   07/11/17 117/62   01/03/17 108/68    Weight from Last 3 Encounters:   07/30/17 155 lb (70.3 kg)   04/04/17 151 lb (68.5 kg)   01/03/17 149 lb (67.6 kg)              We Performed the Following     *UA reflex to Microscopic and Culture ([place] [facility] (except [place] and [place])     CBC with platelets and differential     Comprehensive metabolic panel (BMP + Alb, Alk Phos, ALT, AST, Total. Bili, TP)     Lyme Disease Jonna with reflex to WB Serum     Urine Culture Aerobic Bacterial     Urine Microscopic          Today's Medication Changes          These changes are accurate as of: 7/30/17  8:42 PM.  If you have any questions, ask your nurse or doctor.               Start taking these medicines.        Dose/Directions    * ondansetron 4 MG ODT tab   Commonly known as:  ZOFRAN-ODT   Used for:  Nausea and vomiting, intractability of vomiting not specified, unspecified vomiting type   Started by:  Brock Vasquez MD        Dose:  4 mg   Take 1 tablet (4 mg) by " mouth once for 1 dose   Quantity:  1 tablet   Refills:  0       * ondansetron 4 MG ODT tab   Commonly known as:  ZOFRAN-ODT   Used for:  Nausea and vomiting, intractability of vomiting not specified, unspecified vomiting type   Started by:  Brock Vasquez MD        Dose:  4 mg   Take 1 tablet (4 mg) by mouth every 8 hours as needed for nausea   Quantity:  12 tablet   Refills:  0       * Notice:  This list has 2 medication(s) that are the same as other medications prescribed for you. Read the directions carefully, and ask your doctor or other care provider to review them with you.      Stop taking these medicines if you haven't already. Please contact your care team if you have questions.     naproxen 500 MG tablet   Commonly known as:  NAPROSYN   Stopped by:  Brock Vasquez MD                Where to get your medicines      These medications were sent to [facility] [address] [facility] [address]  [address], [facility] [address]     Phone:  [phone]     ondansetron 4 MG ODT tab         Some of these will need a paper prescription and others can be bought over the counter.  Ask your nurse if you have questions.     You don't need a prescription for these medications     ondansetron 4 MG ODT tab                Primary Care Provider Office Phone # Fax #    Yasmine Richards [phone]       [place] [facility] [address] [place] [address]        Equal Access to Services     [facility] AH: Hadii aad ku hadasho Soomaali, waaxda luqadaha, qaybta kaalmada adeegyada, waxay mookie adame adejaspal thao. So [facility] [phone].    ATENCIÓN: Si habla español, tiene a grant disposición servicios gratuitos de asistencia lingüística. Llame al [phone].    We comply with applicable federal civil rights laws and [place] laws. We do not discriminate on the basis of race, color, national origin, age, disability sex, sexual  orientation or gender identity.            Thank you!     Thank you for choosing [facility]  for your care. Our goal is always to provide you with excellent care. Hearing back from our patients is one way we can continue to improve our services. Please take a few minutes to complete the written survey that you may receive in the mail after your visit with us. Thank you!             Your Updated Medication List - Protect others around you: Learn how to safely use, store and throw away your medicines at www.disposemymeds.org.          This list is accurate as of: 7/30/17  8:42 PM.  Always use your most recent med list.                   Brand Name Dispense Instructions for use Diagnosis    BUDESONIDE (NASAL) NA      [place] in nostril daily 1 VIAL IN BETH POT        buPROPion 150 MG 24 hr tablet    WELLBUTRIN XL    180 tablet    Take 1 tablet (150 mg) by mouth every morning    Major depressive disorder, recurrent episode, mild (H)       cetirizine 10 MG tablet    zyrTEC    90 tablet    Take 1 tablet by mouth every evening.    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 Read the directions carefully, and ask your doctor or other care provider to review them with you.

## 2017-07-31 NOTE — PATIENT INSTRUCTIONS
Okay for tylenol for discomfort.  Okay for zofran for nausea.    We will contact you if the urine culture is positive for infection.      Possible Gallstone with Biliary Colic (Presumed)    Your abdominal pain is may be due to spasm of the gallbladder. This is called gallbladder or biliary colic. The gallbladder is a small sac under the liver, which stores and releases bile. Bile is a fluid that aids in the digestion of fat. Eating fatty food stimulates the gallbladder to contract, and release the bile. A gallstone may form in this sac. Although most people do not have symptoms, when the stone moves and blocks the passage of bile out of the bladder, it can cause pain and even an infection.  To be more certain of the diagnosis, you may need to have an ultrasound, CT-scan or other special test.  A number of things increase the risk for developing gallstones:    Women are more likely    Obesity    Increasing age    Losing or gaining weight quickly    High calorie diet    Pregnancy    Hormone therapy    Diabetes  The most common symptoms are:    Abdominal pain, cramping, aching    Nausea, vomiting    Fever  Many illnesses can cause these symptoms. This pain usually starts in the upper right side of your abdomen. Sometimes it can radiate to your right shoulder, back and arm. It usually starts suddenly, becomes more intense quickly, and then gradually decreases and disappears over a couple of hours. Elderly people and diabetics may have difficulty showing where the pain is exactly.  Home care    Rest in bed and follow a clear liquid diet until feeling better. If pain or nausea medicine was given to help with your symptoms, take these as directed.    You can take acetaminophen or ibuprofen for pain, unless you were given a different pain medicine to use.Note: If you have chronic liver or kidney disease or ever had a stomach ulcer or GI bleeding or are taking blood thinner medications, talk with your doctor before using  these medicines.    Fat in your diet makes the gallbladder contract and may cause increased pain. Therefore, avoid fat in your diet over the next two days and follow a low-fat diet after that. If you are overweight, a low fat diet will help you lose weight.  Follow-up care  If a test was already scheduled for you, keep this appointment. Be sure you know how to prepare yourself for the test. Usually, you will be asked not to eat or drink anything for at least 8 hours before the test. Schedule an appointment with your own doctor after your test is complete to discuss the findings.  When to seek medical advice  Call your healthcare provider if any of the following occur:    Pain gets worse or moves to the right lower abdomen    Repeated vomiting    Swelling of the abdomen    Pain lasts over 6 hours    Fever of 100.4  F (38  C) or higher, or as directed by your healthcare provider    Weakness, dizziness    Dark urine or light colored stools    Yellow color of the skin or eyes    Chest, arm, back, neck or jaw pain  Call 911  Get emergency medical care right away if any of these occur:    Trouble breathing    Very confused    Very drowsy or trouble awakening    Fainting or loss of consciousness    Rapid heart rate  Date Last Reviewed: 8/23/2015 2000-2017 [employer]. [address] [place] [address]. All rights reserved. This information is not intended as a substitute for professional medical care. Always follow your healthcare professional's instructions.        Hematuria: Possible Causes     Many things can lead to blood in the urine (hematuria). The blood may be seen with the eye (macroscopic or gross hematuria). Or it may only be seen when the urine is looked at under a microscope (microscopic hematuria). Some of the most common causes of blood in the urine are listed below. Often, no cause for the blood can be found. This is called idiopathic hematuria.    Kidney or bladder stones are  "collections of crystals. They form in the urine. Stones may be found anywhere in the urinary tract. But they form most often in the kidneys or bladder. In addition to blood in the urine, they can cause severe pain.    BPH stands for benign prostatic hyperplasia. It is enlargement of the prostate gland. It happens as men age. BPH often causes problems with urination. It sometimes causes blood in the urine.    A urinary tract infection (UTI) is due to bacteria growing in the urinary tract. It can cause blood in the urine. Other symptoms include burning or pain with urination. You may need to urinate often or urgently. You may also have a fever.    Damage to the urinary tract may cause blood in the urine. This damage may be due to a blow or accident. It may also result from the use of a urinary catheter. Very hard exercise may sometimes irritate the urinary tract and cause bleeding.    Cancer may occur anywhere in the urinary tract. A tumor may sometimes cause no symptoms other than bleeding.     Other possible causes of bleeding include:    Prostatitis (infection of the prostate gland)    Taking anticoagulants    Blockage in the urinary tract    Disease or inflammation of the kidney    Cystic diseases of the kidneys    Sickle cell anemia    Vigorous exercise    Endometriosis  Date Last Reviewed: 12/1/2016 2000-2017 [employer]. [address] [place] [address], [place] [address]. All rights reserved. This information is not intended as a substitute for professional medical care. Always follow your healthcare professional's instructions.         * VOMITING [6yr-Adult]  Vomiting is a common symptom that may be due to different causes. These include gastroenteritis (\"stomach-flu\"), food poisoning and gastritis. There are other more serious causes of vomiting which may be hard to diagnose early in the illness. Therefore, it is important to watch for the warning signs listed below.  The main danger from repeated " "vomiting is \"dehydration\". This is due to excess loss of water and minerals from the body. When this occurs, body fluids must be replaced.`  HOME CARE:    If symptoms are severe, rest at home for the next 24 hours.    You may use acetaminophen (Tylenol) 650-1000 mg every 6 hours to control fever, unless another medicine was prescribed. [ NOTE : If you have chronic liver disease, talk with your doctor before using acetaminophen.] (Aspirin should never be used in anyone under 18 years of age who is ill with a fever. It may cause severe liver damage.)    Avoid tobacco and alcohol use, which may worsen your symptoms.    If medicines for vomiting were prescribed, take as directed.  DURING THE FIRST 12-24 HOURS follow the diet below. Try to take frequent small sips even if you vomit occasionally:    FRUIT JUICES: Apple, grape juice, clear fruit drinks, electrolyte replacement and sports drinks.    BEVERAGES: Sport drinks such as Gatorade, soft drinks without caffeine; mineral water (plain or flavored), decaffeinated tea and coffee.    SOUPS: Clear broth, consommé and bouillon    DESSERTS: Plain gelatin (Jell-O), popsicles and fruit juice bars.  DURING THE NEXT 24 HOURS you may add the following to the above:    Hot cereal, plain toast, bread, rolls, crackers    Plain noodles, rice, mashed potatoes, chicken noodle or rice soup    Unsweetened canned fruit (avoid pineapple), bananas    Avoid dairy products    Limit caffeine and chocolate. No spices or seasonings except salt.  DURING THE NEXT 24 HOURS  Slowly go back to a normal diet, as you feel better and your symptoms lessen.  FOLLOW UP with your doctor as advised if you are not improving over the next 2-3 days.  GET PROMPT MEDICAL ATTENTION if any of the following occur:    Constant abdominal pain that stays in the same spot or gets worse    Continued vomiting (unable to keep liquids down) for 24 hours    Frequent diarrhea (more than 5 times a day); blood (red or black " color) in diarrhea    No urine output for 12 hours or extreme thirst    Weakness, dizziness or fainting    Unusually drowsy or confused    Fever over 101.0  F (38.3  C) for more than 3 days    Yellow color of the eyes or skin    [id number] [employer] [facility], [address] [place] [address]. All rights reserved. This information is not intended as a substitute for professional medical care. Always follow your healthcare professional's instructions.

## 2017-07-31 NOTE — PROGRESS NOTES
SUBJECTIVE:   Mae Connors is a 57 year old female presenting with a chief complaint of nausea and headache.    Developed headache and nausea last week, this lasted for 1 day and resolved.  Developed same symptoms again yesterday and has persisted, feeling worse.  Denies any fever.  Has tried tylenol yesterday.  Vomited X1 today.  No diarrhea.  Denies any prior history of migraines.  Patient has seasonal allergies but states that sinuses are not bad.  Last BM 2 days and normal.  Still has gallbladder and appendix.  Denies any dysuria symptoms.  Fatigue and feels unwell.  Per patient thyroid tests has been good.    Patient has swollen bump on back of scalp/neck.  Patient was up in cabin couple of weeks ago.  Does not recall tick bite    Past Medical History:   Diagnosis Date     Allergic rhinitis, cause unspecified 1990    Hx of immunotherapy , failed     Family history of breast cancer in female     Sister BRCA pos but patient tested negative 7/2013     Renal disease     incontinence     Thyroid disease     hypothyroid     Current Outpatient Prescriptions   Medication Sig Dispense Refill     methotrexate 2.5 MG tablet CHEMO Take 8 tablets (20 mg) by mouth once a week 40 tablet 3     levothyroxine (SYNTHROID/LEVOTHROID) 88 MCG tablet Take 1 tablet (88 mcg) by mouth daily 30 tablet 1     folic acid (FOLVITE) 1 MG tablet Take 1 tablet (1 mg) by mouth daily 100 tablet 3     BUDESONIDE, NASAL, NA Spray in nostril daily 1 VIAL IN BETH POT       cetirizine (ZYRTEC) 10 MG tablet Take 1 tablet by mouth every evening. 90 tablet 3     buPROPion (WELLBUTRIN XL) 150 MG 24 hr tablet Take 1 tablet (150 mg) by mouth every morning (Patient not taking: Reported on 7/30/2017) 180 tablet 3     Social History   Substance Use Topics     Smoking status: Never Smoker     Smokeless tobacco: Never Used     Alcohol use 0.0 - 0.6 oz/week     0 - 1 Standard drinks or equivalent per week      Comment: occ.       ROS:  CONSTITUTIONAL:NEGATIVE  "for fever, chills, change in weight and POSITIVE  for fatigue and malaise  INTEGUMENTARY/SKIN: NEGATIVE for worrisome rashes, moles or lesions  ENT/MOUTH: NEGATIVE for ear, mouth and throat problems and POSITIVE for Hx sinus infections and nasal congestion  RESP:NEGATIVE for significant cough or SOB  CV: NEGATIVE for chest pain, palpitations or peripheral edema  GI: POSITIVE for nausea, poor appetite and vomiting  : negative for, dysuria and frequency   MUSCULOSKELETAL: POSITIVE  for arthralgias   ENDOCRINE: NEGATIVE for temperature intolerance, skin/hair changes and Hx thyroid disease  HEME/ALLERGY/IMMUNE: NEGATIVE for bleeding problems  PSYCHIATRIC: NEGATIVE for changes in mood or affect and POSITIVE forHx depression    OBJECTIVE  :/60 (BP Location: Right arm, Patient Position: Chair, Cuff Size: Adult Regular)  Pulse 78  Temp 99.9  F (37.7  C) (Oral)  Ht 5' 2\" (1.575 m)  Wt 155 lb (70.3 kg)  SpO2 96%  BMI 28.35 kg/m2  GENERAL APPEARANCE: healthy, alert and no distress  EYES: EOMI,  PERRL, conjunctiva clear  HENT: ear canals and TM's normal.  Nose and mouth without ulcers, erythema or lesions.  No sinus tenderness.    NECK: supple, nontender, no lymphadenopathy.  Small lymph node on back of scalp, not inflamed, soft  RESP: lungs clear to auscultation - no rales, rhonchi or wheezes  CV: regular rates and rhythm, normal S1 S2, no murmur noted  ABDOMEN:  soft, mild tenderness RUQ, LUQ, no rebound, no guarding, no HSM or masses and bowel sounds normal  Extremities: no peripheral edema or tenderness, peripheral pulses normal  NEURO: Normal strength and tone, sensory exam grossly normal,  normal speech and mentation  SKIN: no suspicious lesions or rashes  PSYCH: mentation appears normal and fatigued    Results for orders placed or performed in visit on 07/30/17   CBC with platelets and differential   Result Value Ref Range    WBC 3.8 (L) 4.0 - 11.0 10e9/L    RBC Count 4.41 3.8 - 5.2 10e12/L    Hemoglobin " 13.1 11.7 - 15.7 g/dL    Hematocrit 38.6 35.0 - 47.0 %    MCV 88 78 - 100 fl    MCH 29.7 26.5 - 33.0 pg    MCHC 33.9 31.5 - 36.5 g/dL    RDW 13.4 10.0 - 15.0 %    Platelet Count 211 150 - 450 10e9/L    Diff Method Automated Method     % Neutrophils 75.9 %    % Lymphocytes 13.6 %    % Monocytes 9.9 %    % Eosinophils 0.3 %    % Basophils 0.3 %    Absolute Neutrophil 2.9 1.6 - 8.3 10e9/L    Absolute Lymphocytes 0.5 (L) 0.8 - 5.3 10e9/L    Absolute Monocytes 0.4 0.0 - 1.3 10e9/L    Absolute Eosinophils 0.0 0.0 - 0.7 10e9/L    Absolute Basophils 0.0 0.0 - 0.2 10e9/L   Comprehensive metabolic panel (BMP + Alb, Alk Phos, ALT, AST, Total. Bili, TP)   Result Value Ref Range    Sodium 130 (L) 133 - 144 mmol/L    Potassium 4.0 3.4 - 5.3 mmol/L    Chloride 96 94 - 109 mmol/L    Carbon Dioxide 29 20 - 32 mmol/L    Anion Gap 5 3 - 14 mmol/L    Glucose 121 (H) 70 - 99 mg/dL    Urea Nitrogen 10 7 - 30 mg/dL    Creatinine 0.90 0.52 - 1.04 mg/dL    GFR Estimate 65 >60 mL/min/1.7m2    GFR Estimate If Black 79 >60 mL/min/1.7m2    Calcium 9.5 8.5 - 10.1 mg/dL    Bilirubin Total 0.6 0.2 - 1.3 mg/dL    Albumin 3.6 3.4 - 5.0 g/dL    Protein Total 7.5 6.8 - 8.8 g/dL    Alkaline Phosphatase 82 40 - 150 U/L    ALT 58 (H) 0 - 50 U/L    AST 49 (H) 0 - 45 U/L   *UA reflex to Microscopic and Culture ([facility] (except [place] and [place])   Result Value Ref Range    Color Urine Yellow     Appearance Urine Clear     Glucose Urine Negative NEG mg/dL    Bilirubin Urine (A) NEG     Small  This is an unconfirmed screening test result. A positive result may be false.      Ketones Urine Trace (A) NEG mg/dL    Specific Gravity Urine >1.030 1.003 - 1.035    Blood Urine Moderate (A) NEG    pH Urine 5.5 5.0 - 7.0 pH    Protein Albumin Urine 100 (A) NEG mg/dL    Urobilinogen Urine 0.2 0.2 - 1.0 EU/dL    Nitrite Urine Negative NEG    Leukocyte Esterase Urine Negative NEG    Source Midstream Urine    Urine Microscopic   Result Value Ref  Range    WBC Urine 2-5 (A) 0 - 2 /HPF    RBC Urine 10-25 (A) 0 - 2 /HPF    Squamous Epithelial /LPF Urine Moderate (A) FEW /LPF    Bacteria Urine Few (A) NEG /HPF    Mucous Urine Present (A) NEG /LPF     XRAY - abdomen - scattered bowel gas pattern, no air-fluid level for obstruction    ASSESSMENT/PLAN:  (R11.2) Nausea and vomiting, intractability of vomiting not specified, unspecified vomiting type  (primary encounter diagnosis)  Plan: CBC with platelets and differential,         Comprehensive metabolic panel (BMP + Alb, Alk         Phos, ALT, AST, Total. Bili, TP), *UA reflex to        Microscopic and Culture ([place] and [place]         [facility] (except [place] and [place]), XR         Abdomen 2 Views, Lyme Disease Jonna with reflex         to WB Serum, ondansetron (ZOFRAN-ODT) 4 MG ODT         tab, Urine Microscopic, ondansetron         (ZOFRAN-ODT) 4 MG ODT tab            (R31.29) Microscopic hematuria  Plan: Urine Culture Aerobic Bacterial            Unclear etiology, does not appear in acute distress, no acute abdomen presentation.  Reviewed symptomatic treatment, plenty of fluids and rest.  Zofran given in clinic which help with nausea, RX for zofran given, reviewed importance of hydration.  Recommend to follow up with primary for further workup, may require ultrasound or CT scan if symptoms persists, possible gallbladder etiology.  Will follow up on urine culture and treat if true bladder infection.  Will follow up on lymes and treat if positive.    Encourage to follow up with primary provider within 1 week, to ER if develops acute worsening of symptoms.    Brock Vasquez MD

## 2017-07-31 NOTE — NURSING NOTE
"Mae Connors;   Chief Complaint   Patient presents with     Nausea     and headache onset last weekend, went away during the week and this weekend nausea started again, has been sleeping most the weekend, emesis prior to coming to clinic - denies sore throat - lump on back of right neck x 2 weeks, denies urinary symptoms, no tick bites that she knows of      Urgent Care     Initial /60 (BP Location: Right arm, Patient Position: Chair, Cuff Size: Adult Regular)  Pulse 78  Temp 99.9  F (37.7  C) (Oral)  Ht 5' 2\" (1.575 m)  Wt 155 lb (70.3 kg)  SpO2 96%  BMI 28.35 kg/m2 Estimated body mass index is 28.35 kg/(m^2) as calculated from the following:    Height as of this encounter: 5' 2\" (1.575 m).    Weight as of this encounter: 155 lb (70.3 kg)..  BP completed using cuff size regular.  Aylin West R.N.  "

## 2017-08-01 LAB
BACTERIA SPEC CULT: NORMAL
MICRO REPORT STATUS: NORMAL
SPECIMEN SOURCE: NORMAL

## 2017-08-02 LAB — B BURGDOR IGG+IGM SER QL: 0.04 (ref 0–0.89)

## 2017-08-03 ENCOUNTER — OFFICE VISIT (OUTPATIENT)
Dept: PEDIATRICS | Facility: CLINIC | Age: 57
End: 2017-08-03
Payer: COMMERCIAL

## 2017-08-03 VITALS
TEMPERATURE: 99 F | OXYGEN SATURATION: 99 % | DIASTOLIC BLOOD PRESSURE: 52 MMHG | SYSTOLIC BLOOD PRESSURE: 96 MMHG | BODY MASS INDEX: 27.82 KG/M2 | HEIGHT: 62 IN | WEIGHT: 151.2 LBS | HEART RATE: 72 BPM

## 2017-08-03 DIAGNOSIS — B34.9 VIRAL SYNDROME: Primary | ICD-10-CM

## 2017-08-03 DIAGNOSIS — R53.81 MALAISE: ICD-10-CM

## 2017-08-03 DIAGNOSIS — R82.81 STERILE PYURIA: ICD-10-CM

## 2017-08-03 DIAGNOSIS — R74.01 TRANSAMINITIS: ICD-10-CM

## 2017-08-03 LAB
ALBUMIN SERPL-MCNC: 3.6 G/DL (ref 3.4–5)
ALP SERPL-CCNC: 73 U/L (ref 40–150)
ALT SERPL W P-5'-P-CCNC: 44 U/L (ref 0–50)
AST SERPL W P-5'-P-CCNC: 30 U/L (ref 0–45)
BILIRUB DIRECT SERPL-MCNC: <0.1 MG/DL (ref 0–0.2)
BILIRUB SERPL-MCNC: 0.4 MG/DL (ref 0.2–1.3)
PROT SERPL-MCNC: 7.6 G/DL (ref 6.8–8.8)
T4 FREE SERPL-MCNC: 1.12 NG/DL (ref 0.76–1.46)
TSH SERPL DL<=0.005 MIU/L-ACNC: 0.18 MU/L (ref 0.4–4)

## 2017-08-03 PROCEDURE — 84443 ASSAY THYROID STIM HORMONE: CPT | Performed by: INTERNAL MEDICINE

## 2017-08-03 PROCEDURE — 36415 COLL VENOUS BLD VENIPUNCTURE: CPT | Performed by: INTERNAL MEDICINE

## 2017-08-03 PROCEDURE — 86665 EPSTEIN-BARR CAPSID VCA: CPT | Performed by: INTERNAL MEDICINE

## 2017-08-03 PROCEDURE — 80076 HEPATIC FUNCTION PANEL: CPT | Performed by: INTERNAL MEDICINE

## 2017-08-03 PROCEDURE — 86645 CMV ANTIBODY IGM: CPT | Performed by: INTERNAL MEDICINE

## 2017-08-03 PROCEDURE — 99214 OFFICE O/P EST MOD 30 MIN: CPT | Mod: GC | Performed by: STUDENT IN AN ORGANIZED HEALTH CARE EDUCATION/TRAINING PROGRAM

## 2017-08-03 PROCEDURE — 84439 ASSAY OF FREE THYROXINE: CPT | Performed by: INTERNAL MEDICINE

## 2017-08-03 PROCEDURE — 86644 CMV ANTIBODY: CPT | Performed by: INTERNAL MEDICINE

## 2017-08-03 ASSESSMENT — ANXIETY QUESTIONNAIRES
5. BEING SO RESTLESS THAT IT IS HARD TO SIT STILL: NOT AT ALL
1. FEELING NERVOUS, ANXIOUS, OR ON EDGE: NOT AT ALL
IF YOU CHECKED OFF ANY PROBLEMS ON THIS QUESTIONNAIRE, HOW DIFFICULT HAVE THESE PROBLEMS MADE IT FOR YOU TO DO YOUR WORK, TAKE CARE OF THINGS AT HOME, OR GET ALONG WITH OTHER PEOPLE: NOT DIFFICULT AT ALL
7. FEELING AFRAID AS IF SOMETHING AWFUL MIGHT HAPPEN: NOT AT ALL
3. WORRYING TOO MUCH ABOUT DIFFERENT THINGS: NOT AT ALL
GAD7 TOTAL SCORE: 0
2. NOT BEING ABLE TO STOP OR CONTROL WORRYING: NOT AT ALL
6. BECOMING EASILY ANNOYED OR IRRITABLE: NOT AT ALL

## 2017-08-03 ASSESSMENT — PATIENT HEALTH QUESTIONNAIRE - PHQ9: 5. POOR APPETITE OR OVEREATING: NOT AT ALL

## 2017-08-03 NOTE — MR AVS SNAPSHOT
After Visit Summary   8/3/2017    Mae Connors    MRN: [id number]           Patient Information     Date Of Birth          1960        Visit Information        Provider Department      8/3/2017 1:00 PM Ge Vasquez MD [facility]        Today's Diagnoses     Malaise    -  1    Transaminitis        Sterile pyuria          Care Instructions      Managing Fatigue     Family members can help with meals and chores around the house.   Fatigue is common. It can be caused by worry, lack of sleep, or poor appetite. Fatigue can also be a sign of anemia, a shortage of red blood cells. You might need medical treatment for anemia. The tips below can help you feel better.  Conserving energy    Keep track of the times of day when you are most tired and plan around them. For instance, if you are more tired in the afternoon, try to get tasks done in the morning.    Decide which tasks are most important. Do those first.    Pass tasks along to others when you need to. Ask for help.    Accept help when it s offered. Tell people what they can do to help. For instance, you may need someone to fix a meal, fold clothes, or put gas in your car.    Plan rest times. You may want to take a nap each day. Just sitting quietly for a few minutes can make you feel more rested.  What you can do to feel better    Relax before you try to sleep. Take a bath or read for a while.    Form a sleep pattern. Go to bed at the same time each night and get up at the same time each morning.    Eat well. Choose foods from all of the food groups each day.    Exercise. Take a brisk walk to help increase your energy.    Avoid caffeine and alcohol. Drink plenty of water or fruit juices instead.  Treating anemia  If you begin to feel more tired than normal, tell your doctor. Fatigue could be a sign of anemia. This problem is fairly common in cancer patients, especially during chemotherapy and radiation treatments. If your  red blood cell count is too low, you may get a blood transfusion. In some cases, you may need medicine to increase the number of red blood cells your body makes.  When to call your healthcare provider  Call your healthcare provider if you have:    Shortness of breath or chest pain    A dizzy feeling when you get up from lying or sitting down    Paler skin than normal    Extreme tiredness that is not helped by sleep   Date Last Reviewed: 1/3/2016    5560-9230 [employer]. [address] [place] [address]. All rights reserved. This information is not intended as a substitute for professional medical care. Always follow your healthcare professional's instructions.    I will be in touch about your lab results.  I would like you to schedule an appointment for an U/S of your abdomen in the time being.  Please get as much rest and fluids as possible.  Call with questions or concerns!    Ge Vasquez MD  IM/PEDS, PGY3            Follow-ups after your visit        Your next 10 appointments already scheduled     Aug 07, 2017  4:00 PM CDT   Office Visit with MIKAYLA Miranda [facility]    [address]  [facility] [address] [phone]           Bring a current list of meds and any records pertaining to this visit. For Physicals, please bring immunization records and any forms needing to be filled out. Please arrive 10 minutes early to complete paperwork.            Oct 10, 2017  7:30 AM CDT   Return Visit with Felipe Reyna MD   [facility] ([facility])    [address]  [facility] [address] [phone]           Please inform patient of the clinic address: [facility] [id number] [place], [place] [address] [facility] [address]              Future tests that were ordered for you today     Open Future Orders      "   Priority Expected Expires Ordered    US Abdomen Complete Routine  8/3/2018 8/3/2017            Who to contact     If you have questions or need follow up information about today's clinic visit or your schedule please contact [facility] LASHON directly at [phone].  Normal or non-critical lab and imaging results will be communicated to you by Free[employer], letter or phone within 4 business days after the clinic has received the results. If you do not hear from us within 7 days, please contact the clinic through FreeAgent[employer] or phone. If you have a critical or abnormal lab result, we will notify you by phone as soon as possible.  Submit refill requests through [employer] or call your pharmacy and they will forward the refill request to us. Please allow 3 business days for your refill to be completed.          Additional Information About Your Visit        FreeAgenthart Information     [employer] gives you secure access to your electronic health record. If you see a primary care provider, you can also send messages to your care team and make appointments. If you have questions, please call your primary care clinic.  If you do not have a primary care provider, please call [phone] and they will assist you.        Care EveryWhere ID     This is your Care EveryWhere ID. This could be used by other organizations to access your [place] medical records  [id number]        Your Vitals Were     Pulse Temperature Height Last Period Pulse Oximetry BMI (Body Mass Index)    72 99  F (37.2  C) (Oral) 5' 2\" (1.575 m) 10/13/2014 (Approximate) 99% 27.65 kg/m2       Blood Pressure from Last 3 Encounters:   08/03/17 96/52   07/30/17 108/60   07/11/17 117/62    Weight from Last 3 Encounters:   08/03/17 151 lb 3.2 oz (68.6 kg)   07/30/17 155 lb (70.3 kg)   04/04/17 151 lb (68.5 kg)              We Performed the Following     CMV Antibody IgG     CMV antibody IgM     EBV Capsid Antibody IgG     EBV Capsid Antibody IgM     Hepatic panel " (Albumin, ALT, AST, Bili, Alk Phos, TP)     TSH with free T4 reflex        Primary Care Provider Office Phone # Fax #    Yasmine Richards [phone]       [facility] [address] [place] [address]        Equal Access to Services     [facility]: Hadii nimisha ku hadasho Soomaali, waaxda luqadaha, qaybta kaalmada adeegyada, waxay bijanin haymt thao. So [facility] [phone].    ATENCIÓN: Si habla español, tiene a grant disposición servicios gratuitos de asistencia lingüística. [facility] [phone].    We comply with applicable federal civil rights laws and [place] laws. We do not discriminate on the basis of race, color, national origin, age, disability sex, sexual orientation or gender identity.            Thank you!     Thank you for choosing [facility]  for your care. Our goal is always to provide you with excellent care. Hearing back from our patients is one way we can continue to improve our services. Please take a few minutes to complete the written survey that you may receive in the mail after your visit with us. Thank you!             Your Updated Medication List - Protect others around you: Learn how to safely use, store and throw away your medicines at www.disposemymeds.org.          This list is accurate as of: 8/3/17  1:43 PM.  Always use your most recent med list.                   Brand Name Dispense Instructions for use Diagnosis    BUDESONIDE (NASAL) NA      Entiat in nostril daily 1 VIAL IN BETH POT        buPROPion 150 MG 24 hr tablet    WELLBUTRIN XL    180 tablet    Take 1 tablet (150 mg) by mouth every morning    Major depressive disorder, recurrent episode, mild (H)       cetirizine 10 MG tablet    zyrTEC    90 tablet    Take 1 tablet by mouth every evening.    Allergic rhinitis, cause unspecified       folic acid 1 MG tablet    FOLVITE    100 tablet    Take 1 tablet (1 mg) by mouth daily    Rheumatoid arthritis involving both hands  with positive rheumatoid factor (H)       levothyroxine 88 MCG tablet    SYNTHROID/LEVOTHROID    30 tablet    Take 1 tablet (88 mcg) by mouth daily    Other specified hypothyroidism       methotrexate 2.5 MG tablet CHEMO     40 tablet    Take 8 tablets (20 mg) by mouth once a week    Rheumatoid arthritis involving both hands with positive rheumatoid factor (H)       ondansetron 4 MG ODT tab    ZOFRAN-ODT    12 tablet    Take 1 tablet (4 mg) by mouth every 8 hours as needed for nausea    Nausea and vomiting, intractability of vomiting not specified, unspecified vomiting type

## 2017-08-03 NOTE — PATIENT INSTRUCTIONS
Managing Fatigue     Family members can help with meals and chores around the house.   Fatigue is common. It can be caused by worry, lack of sleep, or poor appetite. Fatigue can also be a sign of anemia, a shortage of red blood cells. You might need medical treatment for anemia. The tips below can help you feel better.  Conserving energy    Keep track of the times of day when you are most tired and plan around them. For instance, if you are more tired in the afternoon, try to get tasks done in the morning.    Decide which tasks are most important. Do those first.    Pass tasks along to others when you need to. Ask for help.    Accept help when it s offered. Tell people what they can do to help. For instance, you may need someone to fix a meal, fold clothes, or put gas in your car.    Plan rest times. You may want to take a nap each day. Just sitting quietly for a few minutes can make you feel more rested.  What you can do to feel better    Relax before you try to sleep. Take a bath or read for a while.    Form a sleep pattern. Go to bed at the same time each night and get up at the same time each morning.    Eat well. Choose foods from all of the food groups each day.    Exercise. Take a brisk walk to help increase your energy.    Avoid caffeine and alcohol. Drink plenty of water or fruit juices instead.  Treating anemia  If you begin to feel more tired than normal, tell your doctor. Fatigue could be a sign of anemia. This problem is fairly common in cancer patients, especially during chemotherapy and radiation treatments. If your red blood cell count is too low, you may get a blood transfusion. In some cases, you may need medicine to increase the number of red blood cells your body makes.  When to call your healthcare provider  Call your healthcare provider if you have:    Shortness of breath or chest pain    A dizzy feeling when you get up from lying or sitting down    Paler skin than normal    Extreme tiredness  that is not helped by sleep   Date Last Reviewed: 1/3/2016    2157-6658 [employer]. [address] [place] [address], [place] [address]. All rights reserved. This information is not intended as a substitute for professional medical care. Always follow your healthcare professional's instructions.    I will be in touch about your lab results.  I would like you to schedule an appointment for an U/S of your abdomen in the time being.  Please get as much rest and fluids as possible.  Call with questions or concerns!    Ge Vasquez MD  IM/PEDS, PGY3

## 2017-08-03 NOTE — PROGRESS NOTES
SUBJECTIVE:                                                    Mae Connors is a 57 year old female who presents to clinic today for the following health issues:      ED/UC Followup:    Facility:  [facility]  Date of visit: 7/30/17  Reason for visit: Nausea, vomiting, back pain and headache  Current Status: Things are about the same, the nausea is better, still little headache and back ache          RESPIRATORY SYMPTOMS      Duration: since 7/30/17    Description  fever, headache, fatigue/malaise, myalgias-back ache, nausea and decrease appetite as well as decrease in bowel movements for about a week    Severity: moderate    Accompanying signs and symptoms: swollen lymph note back of neck for 3 weeks, excessive fatigue- difficult to stay awake    History (predisposing factors):  none    Precipitating or alleviating factors: Zofran     Therapies tried and outcome:  Acetaminophen, Zofran and sleep      Symptoms started 1.5 weeks ago.  Symptoms have been cyclic in nature.  Temp max 101.5 at home.  She has been slowly improving during this time however excessive sleepiness.  She is able to go to work now.  No other known sick contacts.  No blood in stool or urine appreciated.  No RA flairs recently.  She has been compliant with her medication for RA; no prior side effects from the medication.  She has had decreased PO intake during this time frame.  She has not defected in the last week however attributes to less PO intake.  Denies dizziness or lightheadness; decreased skin pigmentation.  She has decreased her thyroid dosing in the past 3 weeks.  She also endorses low back pain that is consistent and relieve by Tylenol.      She lives with her kids who help with her cares during this acute setting.        Problem list and histories reviewed & adjusted, as indicated.  Additional history: as documented    Patient Active Problem List   Diagnosis     Allergic rhinitis     Female stress incontinence      Hypothyroidism     Mild major depression (H)     CARDIOVASCULAR SCREENING; LDL GOAL LESS THAN 160     Stress incontinence     Gout     Rheumatoid arthritis involving both hands with positive rheumatoid factor (H)     Past Surgical History:   Procedure Laterality Date     COLONOSCOPY  12    Dr. Marcelo Briscoe at [facility]     COLONOSCOPY  2012    Procedure: COLONOSCOPY;  Colonoscopy  ;  Surgeon: Marcelo Briscoe MD, MD;  Location:  GI     EXCISE MASS FOOT Left 2016    Procedure: EXCISE MASS FOOT;  Surgeon: Marcelo Vaughn DPM;  Location: [facility] NASAL/SINUS SCOPE W THER INSTILL       SLING TRANSPUBO WITHOUT ANTERIOR COLPORRHAPHY  2011    Procedure:SLING TRANSPUBO WITHOUT ANTERIOR COLPORRHAPHY; Pubovaginal Sling; Surgeon:KENNETH NEGRO; Location: OR       Social History   Substance Use Topics     Smoking status: Never Smoker     Smokeless tobacco: Never Used     Alcohol use 0.0 - 0.6 oz/week     0 - 1 Standard drinks or equivalent per week      Comment: occ.     Family History   Problem Relation Age of Onset     Unknown/Adopted Father      Coronary Artery Disease Father      CEREBROVASCULAR DISEASE Maternal Grandmother      Neurologic Disorder Maternal Grandmother      stroke,  in her 50s     CEREBROVASCULAR DISEASE Paternal Grandfather      Neurologic Disorder Paternal Grandfather      stroke     CANCER Paternal Grandmother      Unsure what type of cancer         Current Outpatient Prescriptions   Medication Sig Dispense Refill     ondansetron (ZOFRAN-ODT) 4 MG ODT tab Take 1 tablet (4 mg) by mouth every 8 hours as needed for nausea 12 tablet 0     methotrexate 2.5 MG tablet CHEMO Take 8 tablets (20 mg) by mouth once a week 40 tablet 3     levothyroxine (SYNTHROID/LEVOTHROID) 88 MCG tablet Take 1 tablet (88 mcg) by mouth daily 30 tablet 1     folic acid (FOLVITE) 1 MG tablet Take 1 tablet (1 mg) by mouth daily 100 tablet 3     BUDESONIDE, NASAL, NA Spray in nostril daily 1 VIAL IN  "BETH POT       cetirizine (ZYRTEC) 10 MG tablet Take 1 tablet by mouth every evening. 90 tablet 3     buPROPion (WELLBUTRIN XL) 150 MG 24 hr tablet Take 1 tablet (150 mg) by mouth every morning (Patient not taking: Reported on 8/3/2017) 180 tablet 3     Allergies   Allergen Reactions     Sulfa Drugs      Hives and trouble breathing     BP Readings from Last 3 Encounters:   08/03/17 96/52   07/30/17 108/60   07/11/17 117/62    Wt Readings from Last 3 Encounters:   08/03/17 151 lb 3.2 oz (68.6 kg)   07/30/17 155 lb (70.3 kg)   04/04/17 151 lb (68.5 kg)           Reviewed and updated as needed this visit by clinical staff       Reviewed and updated as needed this visit by Provider         ROS:  A focused ROS was obtained and documented in the HPI for notable findings.    OBJECTIVE:     BP 96/52 (BP Location: Right arm, Patient Position: Chair, Cuff Size: Adult Regular)  Pulse 72  Temp 99  F (37.2  C) (Oral)  Ht 5' 2\" (1.575 m)  Wt 151 lb 3.2 oz (68.6 kg)  LMP 10/13/2014 (Approximate)  SpO2 99%  BMI 27.65 kg/m2  Body mass index is 27.65 kg/(m^2).  GENERAL: healthy, alert and no distress; appropriate affect and affable   EYES: Eyes grossly normal to inspection, PERRL and conjunctivae and sclerae normal  NECK: right occipital lymphadenopathy firm not fixed and tender to the touch, no asymmetry, masses, or scars and thyroid normal to palpation  RESP: lungs clear to auscultation - no rales, rhonchi or wheezes  CV: regular rate and rhythm, normal S1 S2, no S3 or S4, no murmur, click or rub, no peripheral edema and peripheral pulses strong  ABDOMEN: soft, nontender, no hepatosplenomegaly, no masses and bowel sounds normal  MS: no gross musculoskeletal defects noted, no edema  SKIN: Red macules unevenly distributed across abdomen and upper/lower extremities, blanchable without warmth.      Diagnostic Test Results:  EBV/CMV IgG/Igm -pending  TSH with reflex - pending  LFTs - pending  U/S of abdomen complete " -pending    ASSESSMENT/PLAN:   1. Malaise  2. Transaminitis  3. Sterile Pyuria  Most likely viral prodrome however given underlying immunosuppressed state concerned for more serious bacterial vs viral infection.  Imaging recommended to rule out phlegmon or abscess.  Recent dose decrease in thyroid medication could be contributing.   Unlikely due to rheumatoid flair.    - EBV Capsid Antibody IgM  - EBV Capsid Antibody IgG  - TSH with free T4 reflex  - Hepatic panel (Albumin, ALT, AST, Bili, Alk Phos, TP)  - US Abdomen Complete; Future  - CMV antibody IgM  - CMV Antibody IgG  - rest and fluids as much as possible; avoid infants or toddlers given concern for infectious etiologies    Follow up in 2-4 weeks if labs and imaging non-elucidating and symptoms persist.    The patient was seen and discussed with Dr. Hannah, the attending, who agrees with the plan as stated above.    Ge Vasquez MD  [facility]    I have seen this patient and examined him in the presence of Dr. Vasquez.  I was present during the key components of the presenting complaints, physical exam, diagnosis, and plan, and fully concur with the plan as listed in the resident's note.

## 2017-08-03 NOTE — NURSING NOTE
"Chief Complaint   Patient presents with     ER F/U       Initial BP 96/52 (BP Location: Right arm, Patient Position: Chair, Cuff Size: Adult Regular)  Pulse 72  Temp 99  F (37.2  C) (Oral)  Ht 5' 2\" (1.575 m)  Wt 151 lb 3.2 oz (68.6 kg)  LMP 10/13/2014 (Approximate)  SpO2 99%  BMI 27.65 kg/m2 Estimated body mass index is 27.65 kg/(m^2) as calculated from the following:    Height as of this encounter: 5' 2\" (1.575 m).    Weight as of this encounter: 151 lb 3.2 oz (68.6 kg).  Medication Reconciliation: complete   Andreina Navas MA      "

## 2017-08-04 LAB
CMV IGG SERPL QL IA: ABNORMAL AI (ref 0–0.8)
CMV IGM SERPL QL IA: 0.4 AI (ref 0–0.8)
EBV VCA IGG SER QL IA: ABNORMAL AI (ref 0–0.8)
EBV VCA IGM SER QL IA: 0.2 AI (ref 0–0.8)

## 2017-08-04 ASSESSMENT — PATIENT HEALTH QUESTIONNAIRE - PHQ9: SUM OF ALL RESPONSES TO PHQ QUESTIONS 1-9: 0

## 2017-08-04 ASSESSMENT — ANXIETY QUESTIONNAIRES: GAD7 TOTAL SCORE: 0

## 2017-08-10 ENCOUNTER — HOSPITAL ENCOUNTER (OUTPATIENT)
Dept: ULTRASOUND IMAGING | Facility: CLINIC | Age: 57
Discharge: HOME | End: 2017-08-10
Attending: INTERNAL MEDICINE | Admitting: INTERNAL MEDICINE
Payer: COMMERCIAL

## 2017-08-10 DIAGNOSIS — R53.81 MALAISE: ICD-10-CM

## 2017-08-10 DIAGNOSIS — R74.01 TRANSAMINITIS: ICD-10-CM

## 2017-08-10 PROCEDURE — 76700 US EXAM ABDOM COMPLETE: CPT

## 2017-08-11 ENCOUNTER — OFFICE VISIT (OUTPATIENT)
Dept: PEDIATRICS | Facility: CLINIC | Age: 57
End: 2017-08-11
Payer: COMMERCIAL

## 2017-08-11 VITALS
SYSTOLIC BLOOD PRESSURE: 88 MMHG | WEIGHT: 148.7 LBS | HEIGHT: 62 IN | DIASTOLIC BLOOD PRESSURE: 58 MMHG | TEMPERATURE: 97.6 F | HEART RATE: 60 BPM | OXYGEN SATURATION: 97 % | BODY MASS INDEX: 27.36 KG/M2

## 2017-08-11 DIAGNOSIS — D84.9 IMMUNOSUPPRESSED STATUS (H): ICD-10-CM

## 2017-08-11 DIAGNOSIS — R50.9 FEVER, UNSPECIFIED: Primary | ICD-10-CM

## 2017-08-11 DIAGNOSIS — F32.0 MILD MAJOR DEPRESSION (H): ICD-10-CM

## 2017-08-11 LAB
BASOPHILS # BLD AUTO: 0 10E9/L (ref 0–0.2)
BASOPHILS NFR BLD AUTO: 0.3 %
DIFFERENTIAL METHOD BLD: ABNORMAL
EOSINOPHIL # BLD AUTO: 0 10E9/L (ref 0–0.7)
EOSINOPHIL NFR BLD AUTO: 0.6 %
ERYTHROCYTE [DISTWIDTH] IN BLOOD BY AUTOMATED COUNT: 13.5 % (ref 10–15)
HCT VFR BLD AUTO: 31.2 % (ref 35–47)
HGB BLD-MCNC: 10.4 G/DL (ref 11.7–15.7)
LYMPHOCYTES # BLD AUTO: 1.8 10E9/L (ref 0.8–5.3)
LYMPHOCYTES NFR BLD AUTO: 52.7 %
MCH RBC QN AUTO: 29.5 PG (ref 26.5–33)
MCHC RBC AUTO-ENTMCNC: 33.3 G/DL (ref 31.5–36.5)
MCV RBC AUTO: 88 FL (ref 78–100)
MONOCYTES # BLD AUTO: 0.5 10E9/L (ref 0–1.3)
MONOCYTES NFR BLD AUTO: 13.6 %
NEUTROPHILS # BLD AUTO: 1.1 10E9/L (ref 1.6–8.3)
NEUTROPHILS NFR BLD AUTO: 32.8 %
PLATELET # BLD AUTO: 250 10E9/L (ref 150–450)
RBC # BLD AUTO: 3.53 10E12/L (ref 3.8–5.2)
WBC # BLD AUTO: 3.3 10E9/L (ref 4–11)

## 2017-08-11 PROCEDURE — 87798 DETECT AGENT NOS DNA AMP: CPT | Mod: 90 | Performed by: INTERNAL MEDICINE

## 2017-08-11 PROCEDURE — 36415 COLL VENOUS BLD VENIPUNCTURE: CPT | Performed by: INTERNAL MEDICINE

## 2017-08-11 PROCEDURE — 86757 RICKETTSIA ANTIBODY: CPT | Mod: 90 | Performed by: INTERNAL MEDICINE

## 2017-08-11 PROCEDURE — 86666 EHRLICHIA ANTIBODY: CPT | Mod: 90 | Performed by: INTERNAL MEDICINE

## 2017-08-11 PROCEDURE — 99214 OFFICE O/P EST MOD 30 MIN: CPT | Performed by: INTERNAL MEDICINE

## 2017-08-11 PROCEDURE — 87040 BLOOD CULTURE FOR BACTERIA: CPT | Performed by: INTERNAL MEDICINE

## 2017-08-11 PROCEDURE — 85025 COMPLETE CBC W/AUTO DIFF WBC: CPT | Performed by: INTERNAL MEDICINE

## 2017-08-11 PROCEDURE — 99000 SPECIMEN HANDLING OFFICE-LAB: CPT | Performed by: INTERNAL MEDICINE

## 2017-08-11 PROCEDURE — 86618 LYME DISEASE ANTIBODY: CPT | Performed by: INTERNAL MEDICINE

## 2017-08-11 RX ORDER — DOXYCYCLINE 100 MG/1
100 CAPSULE ORAL 2 TIMES DAILY
Qty: 28 CAPSULE | Refills: 0 | Status: SHIPPED | OUTPATIENT
Start: 2017-08-11 | End: 2017-08-11

## 2017-08-11 RX ORDER — DOXYCYCLINE 100 MG/1
100 CAPSULE ORAL 2 TIMES DAILY
Qty: 28 CAPSULE | Refills: 0 | Status: SHIPPED | OUTPATIENT
Start: 2017-08-11 | End: 2017-10-10

## 2017-08-11 NOTE — MR AVS SNAPSHOT
After Visit Summary   8/11/2017    Mae Connors    MRN: [id number]           Patient Information     Date Of Birth          1960        Visit Information        Provider Department      8/11/2017 1:30 PM Yasmine Richards MD [facility]        Today's Diagnoses     Fever, unspecified    -  1    Mild major depression (H)          Care Instructions    I'll let you know about the labs.    Start the doxycycline 1 pill twice daily.  If you are not better on Monday, let me know and I will call an infectious disease doctor.    If you are worse over the weekend please call us.          Follow-ups after your visit        Your next 10 appointments already scheduled     Oct 10, 2017  7:30 AM CDT   Return Visit with Felipe Reyna MD   [facility] ([facility])    [id number] [place] [id number] [phone]           Please inform patient of the clinic address: [facility] [address] , [place] [id number]              Who to contact     If you have questions or need follow up information about today's clinic visit or your schedule please contact [facility] directly at [phone].  Normal or non-critical lab and imaging results will be communicated to you by MyChart, letter or phone within 4 business days after the clinic has received the results. If you do not hear from us within 7 days, please contact the clinic through MyChart or phone. If you have a critical or abnormal lab result, we will notify you by phone as soon as possible.  Submit refill requests through [employer] or call your pharmacy and they will forward the refill request to us. Please allow 3 business days for your refill to be completed.          Additional Information About Your Visit        [employer] Information     [employer] gives you secure access to your electronic health record. If you  "see a primary care provider, you can also send messages to your care team and make appointments. If you have questions, please call your primary care clinic.  If you do not have a primary care provider, please call [phone] and they will assist you.        Care EveryWhere ID     This is your Care EveryWhere ID. This could be used by other organizations to access your [place] medical records  [id number]        Your Vitals Were     Pulse Temperature Height Last Period Pulse Oximetry BMI (Body Mass Index)    60 97.6  F (36.4  C) (Oral) 5' 2\" (1.575 m) 10/13/2014 (Approximate) 97% 27.2 kg/m2       Blood Pressure from Last 3 Encounters:   08/11/17 (!) 88/58   08/03/17 96/52   07/30/17 108/60    Weight from Last 3 Encounters:   08/11/17 148 lb 11.2 oz (67.4 kg)   08/03/17 151 lb 3.2 oz (68.6 kg)   07/30/17 155 lb (70.3 kg)              We Performed the Following     Anaplasma phagocytoph antibody IgG IgM     Blood culture     Blood culture     CBC with platelets differential     DEPRESSION ACTION PLAN (DAP)     Ehrlichia Anaplasma Sp by PCR     Ehrlichia chaffeenis Abys IgG and IgM     Rickettsia rickettsii antibody IgG & IgM          Today's Medication Changes          These changes are accurate as of: 8/11/17  2:41 PM.  If you have any questions, ask your nurse or doctor.               Start taking these medicines.        Dose/Directions    doxycycline Monohydrate 100 MG Caps   Used for:  Fever, unspecified   Started by:  Yasmine Richards MD        Dose:  100 mg   Take 1 capsule (100 mg) by mouth 2 times daily for 14 days   Quantity:  28 capsule   Refills:  0            Where to get your medicines      These medications were sent to [facility] - [place], [place] - [address] [place] [address]  [address], [facility] [address]     Phone:  [phone]     doxycycline Monohydrate 100 MG Caps                Primary Care Provider Office Phone # Fax #    Yasmine Richards, " [phone] [address] [place] [address]        Equal Access to Services     [facility] JERI : Hadii aad ku hadmarkrosaura Triali, waerrolda shawnasriniha, cynthiata nehashaylada sheebaanthonymirella, geovany damico ashantijoe alyjaspal curtiskyalinh thao. So [facility] [phone].    ATENCIÓN: Si habla español, tiene a grant disposición servicios gratuitos de asistencia lingüística. Llame al [phone].    We comply with applicable federal civil rights laws and [place] laws. We do not discriminate on the basis of race, color, national origin, age, disability sex, sexual orientation or gender identity.            Thank you!     Thank you for choosing [facility]  for your care. Our goal is always to provide you with excellent care. Hearing back from our patients is one way we can continue to improve our services. Please take a few minutes to complete the written survey that you may receive in the mail after your visit with us. Thank you!             Your Updated Medication List - Protect others around you: Learn how to safely use, store and throw away your medicines at www.disposemymeds.org.          This list is accurate as of: 8/11/17  2:41 PM.  Always use your most recent med list.                   Brand Name Dispense Instructions for use Diagnosis    BUDESONIDE (NASAL) NA      [place] in nostril daily 1 VIAL IN BETH POT        buPROPion 150 MG 24 hr tablet    WELLBUTRIN XL    180 tablet    Take 1 tablet (150 mg) by mouth every morning    Major depressive disorder, recurrent episode, mild (H)       cetirizine 10 MG tablet    zyrTEC    90 tablet    Take 1 tablet by mouth every evening.    Allergic rhinitis, cause unspecified       doxycycline Monohydrate 100 MG Caps     28 capsule    Take 1 capsule (100 mg) by mouth 2 times daily for 14 days    Fever, unspecified       folic acid 1 MG tablet    FOLVITE    100 tablet    Take 1 tablet (1 mg) by mouth daily    Rheumatoid arthritis involving both hands with positive  rheumatoid factor (H)       levothyroxine 88 MCG tablet    SYNTHROID/LEVOTHROID    30 tablet    Take 1 tablet (88 mcg) by mouth daily    Other specified hypothyroidism       methotrexate 2.5 MG tablet CHEMO     40 tablet    Take 8 tablets (20 mg) by mouth once a week    Rheumatoid arthritis involving both hands with positive rheumatoid factor (H)       ondansetron 4 MG ODT tab    ZOFRAN-ODT    12 tablet    Take 1 tablet (4 mg) by mouth every 8 hours as needed for nausea    Nausea and vomiting, intractability of vomiting not specified, unspecified vomiting type

## 2017-08-11 NOTE — PATIENT INSTRUCTIONS
I'll let you know about the labs.    Start the doxycycline 1 pill twice daily.  If you are not better on Monday, let me know and I will call an infectious disease doctor.    If you are worse over the weekend please call us.

## 2017-08-11 NOTE — PROGRESS NOTES
"  SUBJECTIVE:                                                    Mae Connors is a 57 year old female who presents to clinic today for the following health issues:      Acute Illness   Acute illness concerns: nausea comes and go, fever and very fatigue   Onset: 10-14 days     Fever: x >1wk every night to highest 103.7    Chills/Sweats: YES    Headache (location?): YES    Sinus Pressure:no    Conjunctivitis:  no    Ear Pain: no    Rhinorrhea: no     Congestion: no    Sore Throat: no     Rash - pink spots on hand and all over body    Mental - having a lot of trouble concentrating, feels tired all the time.  Sleeps more.  Has been working.     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 No known tick bite  No unusual animal exposures or food intake      Problem list and histories reviewed & adjusted, as indicated.  Additional history: as documented    Labs reviewed in EPIC    Reviewed and updated as needed this visit by clinical staffTobacco  Allergies  Meds  Problems       Reviewed and updated as needed this visit by Provider  Allergies  Meds  Problems         ROS:  Constitutional, HEENT, cardiovascular, pulmonary, gi and gu systems are negative, except as otherwise noted.      OBJECTIVE:   BP (!) 88/58  Pulse 60  Temp 97.6  F (36.4  C) (Oral)  Ht 5' 2\" (1.575 m)  Wt 148 lb 11.2 oz (67.4 kg)  LMP 10/13/2014 (Approximate)  SpO2 97%  BMI 27.2 kg/m2  Body mass index is 27.2 kg/(m^2).  GENERAL: alert and fatigued  HENT: ear canals and TM's normal, nose and mouth without ulcers or lesions  NECK: no adenopathy, no asymmetry, masses, or scars and thyroid normal to " palpation  RESP: lungs clear to auscultation - no rales, rhonchi or wheezes  CV: regular rate and rhythm, normal S1 S2, no S3 or S4, no murmur, click or rub, no peripheral edema and peripheral pulses strong  ABDOMEN: soft, nontender, no hepatosplenomegaly, no masses and bowel sounds normal  MS: no gross musculoskeletal defects noted, no edema  SKIN: erythematous macules scattered hands, arms, trunk        ASSESSMENT/PLAN:       1. Fever, unspecified  Discussed concern about cause given immunosuppressed and length of illness.  Camping history and exposure to immigrants.  Consider typhoid fever given relative bradycardia so will check blood cultures.  TIck borne illness possible with camping so will check for pathogens via ab screens.  No history to suggest zoonosis.  Given length of illness, will start doxy empirically.  Call if worsening over weekend.  Discussed low BP and need to stay hydrated.  No symptoms related to low BP so will not send her to ER at this time.   - Blood culture  - Blood culture  - Ehrlichia chaffeenis Abys IgG and IgM  - Anaplasma phagocytoph antibody IgG IgM  - Ehrlichia Anaplasma Sp by PCR  - Rickettsia rickettsii antibody IgG & IgM  - CBC with platelets differential  - doxycycline Monohydrate 100 MG CAPS; Take 1 capsule (100 mg) by mouth 2 times daily  Dispense: 28 capsule; Refill: 0  - Lyme Disease Jonna with reflex to WB Serum    2. Mild major depression (H)    - DEPRESSION ACTION PLAN (DAP)    3. Immunosuppressed status (H)  Increased risk for bacterial illness, cultures as above      See Patient Instructions    Yasmine Richards MD  [facility]

## 2017-08-11 NOTE — NURSING NOTE
"Chief Complaint   Patient presents with     URI       Initial BP (!) 88/58  Pulse 60  Temp 97.6  F (36.4  C) (Oral)  Ht 5' 2\" (1.575 m)  Wt 148 lb 11.2 oz (67.4 kg)  LMP 10/13/2014 (Approximate)  SpO2 97%  BMI 27.2 kg/m2 Estimated body mass index is 27.2 kg/(m^2) as calculated from the following:    Height as of this encounter: 5' 2\" (1.575 m).    Weight as of this encounter: 148 lb 11.2 oz (67.4 kg).  Medication Reconciliation: complete   Pavithra Soni MA// August 11, 2017 1:47 PM          "

## 2017-08-13 LAB
A PHAGOCYTOPH IGG TITR SER IF: NORMAL {TITER}
A PHAGOCYTOPH IGM TITR SER IF: NORMAL {TITER}

## 2017-08-14 LAB
A PHAGOCYTOPH DNA BLD QL NAA+PROBE: NOT DETECTED
B BURGDOR IGG+IGM SER QL: 0.12 (ref 0–0.89)
E CHAFFEENSIS DNA BLD QL NAA+PROBE: NOT DETECTED
E CHAFFEENSIS IGG TITR SER: ABNORMAL {TITER}
E CHAFFEENSIS IGM TITR SER: ABNORMAL {TITER}
E EWINGII DNA SPEC QL NAA+PROBE: NOT DETECTED
EHRLICHIA DNA SPEC QL NAA+PROBE: NORMAL
R RICKETTSI IGG TITR SER IF: ABNORMAL {TITER}
R RICKETTSI IGM TITR SER IF: ABNORMAL {TITER}

## 2017-08-17 ENCOUNTER — MYC MEDICAL ADVICE (OUTPATIENT)
Dept: PEDIATRICS | Facility: CLINIC | Age: 57
End: 2017-08-17

## 2017-08-17 DIAGNOSIS — E03.8 OTHER SPECIFIED HYPOTHYROIDISM: ICD-10-CM

## 2017-08-17 LAB
BACTERIA SPEC CULT: NO GROWTH
BACTERIA SPEC CULT: NO GROWTH
Lab: NORMAL
Lab: NORMAL
SPECIMEN SOURCE: NORMAL
SPECIMEN SOURCE: NORMAL

## 2017-08-17 RX ORDER — LEVOTHYROXINE SODIUM 88 UG/1
88 TABLET ORAL DAILY
Qty: 90 TABLET | Refills: 0 | Status: SHIPPED | OUTPATIENT
Start: 2017-08-17 | End: 2017-10-13

## 2017-08-17 NOTE — TELEPHONE ENCOUNTER
Patient reporting symptoms, fever, nausea, body aches, are gone, fatigue lingers.   She inquired about refill of Levothyroxine & confirming follow up from 8/3/17 result note.     Reviewed note, levothyroxine refilled per protocol. Entered TSH labs for future.

## 2017-09-26 DIAGNOSIS — M05.741 RHEUMATOID ARTHRITIS INVOLVING BOTH HANDS WITH POSITIVE RHEUMATOID FACTOR (H): ICD-10-CM

## 2017-09-26 DIAGNOSIS — M05.742 RHEUMATOID ARTHRITIS INVOLVING BOTH HANDS WITH POSITIVE RHEUMATOID FACTOR (H): ICD-10-CM

## 2017-09-29 RX ORDER — FOLIC ACID 1 MG/1
1 TABLET ORAL DAILY
Qty: 100 TABLET | Refills: 3 | Status: SHIPPED | OUTPATIENT
Start: 2017-09-29 | End: 2018-02-02

## 2017-09-29 NOTE — TELEPHONE ENCOUNTER
Refill request for folic acid    Medication last filled 8/18/16 Quantity 100 Refills 3    Last rheumatology office visit 7/11/17  Future rheumatology office visit 10/10/17    Lab Results   Component Value Date    WBC 3.3 08/11/2017    HGB 10.4 08/11/2017    MCV 88 08/11/2017    MCH 29.5 08/11/2017    MCHC 33.3 08/11/2017    RDW 13.5 08/11/2017     08/11/2017     Lab Results   Component Value Date    ALT 44 08/03/2017     Lab Results   Component Value Date    AST 30 08/03/2017     Lab Results   Component Value Date    CR 0.90 07/30/2017     No results found for: URIC

## 2017-10-10 ENCOUNTER — OFFICE VISIT (OUTPATIENT)
Dept: PEDIATRICS | Facility: CLINIC | Age: 57
End: 2017-10-10
Payer: COMMERCIAL

## 2017-10-10 ENCOUNTER — OFFICE VISIT (OUTPATIENT)
Dept: RHEUMATOLOGY | Facility: CLINIC | Age: 57
End: 2017-10-10
Payer: COMMERCIAL

## 2017-10-10 VITALS
OXYGEN SATURATION: 99 % | BODY MASS INDEX: 28.1 KG/M2 | DIASTOLIC BLOOD PRESSURE: 72 MMHG | SYSTOLIC BLOOD PRESSURE: 110 MMHG | HEIGHT: 62 IN | TEMPERATURE: 98.1 F | HEART RATE: 72 BPM | WEIGHT: 152.7 LBS

## 2017-10-10 VITALS
HEART RATE: 67 BPM | BODY MASS INDEX: 27.98 KG/M2 | OXYGEN SATURATION: 98 % | DIASTOLIC BLOOD PRESSURE: 70 MMHG | SYSTOLIC BLOOD PRESSURE: 114 MMHG | WEIGHT: 153 LBS

## 2017-10-10 DIAGNOSIS — Z12.31 VISIT FOR SCREENING MAMMOGRAM: ICD-10-CM

## 2017-10-10 DIAGNOSIS — M05.741 RHEUMATOID ARTHRITIS INVOLVING BOTH HANDS WITH POSITIVE RHEUMATOID FACTOR (H): ICD-10-CM

## 2017-10-10 DIAGNOSIS — Z00.00 ROUTINE GENERAL MEDICAL EXAMINATION AT A HEALTH CARE FACILITY: Primary | ICD-10-CM

## 2017-10-10 DIAGNOSIS — Z23 NEED FOR PROPHYLACTIC VACCINATION AND INOCULATION AGAINST INFLUENZA: ICD-10-CM

## 2017-10-10 DIAGNOSIS — Z23 ENCOUNTER FOR IMMUNIZATION: ICD-10-CM

## 2017-10-10 DIAGNOSIS — F33.0 MAJOR DEPRESSIVE DISORDER, RECURRENT EPISODE, MILD (H): ICD-10-CM

## 2017-10-10 DIAGNOSIS — J30.89 CHRONIC NON-SEASONAL ALLERGIC RHINITIS, UNSPECIFIED TRIGGER: ICD-10-CM

## 2017-10-10 DIAGNOSIS — E03.8 OTHER SPECIFIED HYPOTHYROIDISM: ICD-10-CM

## 2017-10-10 DIAGNOSIS — M05.742 RHEUMATOID ARTHRITIS INVOLVING BOTH HANDS WITH POSITIVE RHEUMATOID FACTOR (H): ICD-10-CM

## 2017-10-10 LAB
BASOPHILS # BLD AUTO: 0 10E9/L (ref 0–0.2)
BASOPHILS NFR BLD AUTO: 0.5 %
DIFFERENTIAL METHOD BLD: NORMAL
EOSINOPHIL # BLD AUTO: 0.2 10E9/L (ref 0–0.7)
EOSINOPHIL NFR BLD AUTO: 3.8 %
ERYTHROCYTE [DISTWIDTH] IN BLOOD BY AUTOMATED COUNT: 14.9 % (ref 10–15)
HCT VFR BLD AUTO: 37.5 % (ref 35–47)
HGB BLD-MCNC: 12.5 G/DL (ref 11.7–15.7)
LYMPHOCYTES # BLD AUTO: 2 10E9/L (ref 0.8–5.3)
LYMPHOCYTES NFR BLD AUTO: 34.3 %
MCH RBC QN AUTO: 30 PG (ref 26.5–33)
MCHC RBC AUTO-ENTMCNC: 33.3 G/DL (ref 31.5–36.5)
MCV RBC AUTO: 90 FL (ref 78–100)
MONOCYTES # BLD AUTO: 0.4 10E9/L (ref 0–1.3)
MONOCYTES NFR BLD AUTO: 6.5 %
NEUTROPHILS # BLD AUTO: 3.2 10E9/L (ref 1.6–8.3)
NEUTROPHILS NFR BLD AUTO: 54.9 %
PLATELET # BLD AUTO: 294 10E9/L (ref 150–450)
RBC # BLD AUTO: 4.17 10E12/L (ref 3.8–5.2)
WBC # BLD AUTO: 5.8 10E9/L (ref 4–11)

## 2017-10-10 PROCEDURE — 36415 COLL VENOUS BLD VENIPUNCTURE: CPT | Performed by: INTERNAL MEDICINE

## 2017-10-10 PROCEDURE — 90472 IMMUNIZATION ADMIN EACH ADD: CPT | Performed by: INTERNAL MEDICINE

## 2017-10-10 PROCEDURE — 90686 IIV4 VACC NO PRSV 0.5 ML IM: CPT | Performed by: INTERNAL MEDICINE

## 2017-10-10 PROCEDURE — 99213 OFFICE O/P EST LOW 20 MIN: CPT | Performed by: INTERNAL MEDICINE

## 2017-10-10 PROCEDURE — 90471 IMMUNIZATION ADMIN: CPT | Performed by: INTERNAL MEDICINE

## 2017-10-10 PROCEDURE — 99396 PREV VISIT EST AGE 40-64: CPT | Mod: 25 | Performed by: INTERNAL MEDICINE

## 2017-10-10 PROCEDURE — 90670 PCV13 VACCINE IM: CPT | Performed by: INTERNAL MEDICINE

## 2017-10-10 PROCEDURE — 80050 GENERAL HEALTH PANEL: CPT | Performed by: INTERNAL MEDICINE

## 2017-10-10 PROCEDURE — 84439 ASSAY OF FREE THYROXINE: CPT | Performed by: INTERNAL MEDICINE

## 2017-10-10 PROCEDURE — 86235 NUCLEAR ANTIGEN ANTIBODY: CPT | Performed by: INTERNAL MEDICINE

## 2017-10-10 RX ORDER — BUDESONIDE 0.5 MG/2ML
0.5 INHALANT ORAL DAILY
Qty: 90 AMPULE | Refills: 3 | Status: SHIPPED | OUTPATIENT
Start: 2017-10-10 | End: 2018-11-21

## 2017-10-10 NOTE — NURSING NOTE
"Chief Complaint   Patient presents with     RECHECK       Initial /70  Pulse 67  Wt 69.4 kg (153 lb)  LMP 10/13/2014 (Approximate)  SpO2 98%  BMI 27.98 kg/m2 Estimated body mass index is 27.98 kg/(m^2) as calculated from the following:    Height as of 8/11/17: 1.575 m (5' 2\").    Weight as of this encounter: 69.4 kg (153 lb).      Patient prefers to be contacted via at Home.   Okay to leave detailed message: Yes  Patient uses MyChart: Yes    Anita GIBBONS LPN    "

## 2017-10-10 NOTE — NURSING NOTE
"Chief Complaint   Patient presents with     Physical       Initial /72 (BP Location: Right arm, Patient Position: Chair, Cuff Size: Adult Regular)  Pulse 72  Temp 98.1  F (36.7  C) (Oral)  Ht 5' 2\" (1.575 m)  Wt 152 lb 11.2 oz (69.3 kg)  LMP 10/13/2014 (Approximate)  SpO2 99%  BMI 27.93 kg/m2 Estimated body mass index is 27.93 kg/(m^2) as calculated from the following:    Height as of this encounter: 5' 2\" (1.575 m).    Weight as of this encounter: 152 lb 11.2 oz (69.3 kg).  Medication Reconciliation: complete   Yane Cottrell MA    "

## 2017-10-10 NOTE — PROGRESS NOTES
SUBJECTIVE:   CC: Mae Connors is an 57 year old woman who presents for preventive health visit.     Physical   Annual:     Getting at least 3 servings of Calcium per day::  Yes    Bi-annual eye exam::  Yes    Dental care twice a year::  NO    Sleep apnea or symptoms of sleep apnea::  None    Frequency of exercise::  2-3 days/week    Duration of exercise::  15-30 minutes    Taking medications regularly::  Yes    Medication side effects::  None    Additional concerns today::  No    RA - slight swelling in wrists but pretty stable and doing well.  On methotrexate and needs vaccinated.    Depression - stopped wellbutrin in May and feeling ok despite stress    Today's PHQ-2 Score:   PHQ-2 ( 1999 Pfizer) 10/7/2017   Q1: Little interest or pleasure in doing things 0   Q2: Feeling down, depressed or hopeless 0   PHQ-2 Score 0   Q1: Little interest or pleasure in doing things Not at all   Q2: Feeling down, depressed or hopeless Not at all   PHQ-2 Score 0       Abuse: Current or Past(Physical, Sexual or Emotional)- No  Do you feel safe in your environment - Yes    Social History   Substance Use Topics     Smoking status: Never Smoker     Smokeless tobacco: Never Used     Alcohol use 0.0 - 0.6 oz/week     0 - 1 Standard drinks or equivalent per week      Comment: occ.     The patient does not drink >3 drinks per day nor >7 drinks per week.    Reviewed orders with patient.  Reviewed health maintenance and updated orders accordingly - Yes  Labs reviewed in [facility]    Patient over age 50, mutual decision to screen reflected in health maintenance.      Pertinent mammograms are reviewed under the imaging tab.  History of abnormal Pap smear: NO - age 30-65 PAP every 5 years with negative HPV co-testing recommended    Reviewed and updated as needed this visit by clinical staffTobacco  Allergies  Meds  Problems  Med Hx  Surg Hx  Fam Hx  Soc Hx          Reviewed and updated as needed this visit by Provider  Allergies  Meds  " Problems              ROS:  C: NEGATIVE for fever, chills, change in weight  I: NEGATIVE for worrisome rashes, moles or lesions  E: NEGATIVE for vision changes or irritation  ENT: NEGATIVE for ear, mouth and throat problems  R: NEGATIVE for significant cough or SOB  B: NEGATIVE for masses, tenderness or discharge  CV: NEGATIVE for chest pain, palpitations or peripheral edema  GI: NEGATIVE for nausea, abdominal pain, heartburn, or change in bowel habits  : NEGATIVE for unusual urinary or vaginal symptoms. Periods are absent  M: NEGATIVE for significant arthralgias or myalgia  N: NEGATIVE for weakness, dizziness or paresthesias  P: NEGATIVE for changes in mood or affect     OBJECTIVE:   /72 (BP Location: Right arm, Patient Position: Chair, Cuff Size: Adult Regular)  Pulse 72  Temp 98.1  F (36.7  C) (Oral)  Ht 5' 2\" (1.575 m)  Wt 152 lb 11.2 oz (69.3 kg)  LMP 10/13/2014 (Approximate)  SpO2 99%  BMI 27.93 kg/m2  EXAM:  GENERAL: healthy, alert and no distress  EYES: Eyes grossly normal to inspection, PERRL and conjunctivae and sclerae normal  HENT: ear canals and TM's normal, nose and mouth without ulcers or lesions  NECK: no adenopathy, no asymmetry, masses, or scars and thyroid normal to palpation  RESP: lungs clear to auscultation - no rales, rhonchi or wheezes  CV: regular rate and rhythm, normal S1 S2, no S3 or S4, no murmur, click or rub, no peripheral edema and peripheral pulses strong  ABDOMEN: soft, nontender, no hepatosplenomegaly, no masses and bowel sounds normal  MS: no gross musculoskeletal defects noted, no edema  SKIN: no suspicious lesions or rashes  NEURO: Normal strength and tone, mentation intact and speech normal  PSYCH: mentation appears normal, affect normal/bright    ASSESSMENT/PLAN:   1. Routine general medical examination at a health care facility  Routine health education discussed: calcium, diet, exercise, weight, safety. Code status and living will benefits discussed.    - " "Full Code    2. Rheumatoid arthritis involving both hands with positive rheumatoid factor (H)  Follow-up with rheum as scheduled, continue medication.  Discussed need for vaccination, see orders  - Centromere Antibody IgG  - Comprehensive metabolic panel  - CBC with platelets differential    3. Major depressive disorder, recurrent episode, mild (H)  Controlled off medications.  Discussed stress and coping, follow-up prn    4. Other specified hypothyroidism  Adjust medication per orders and recheck in 2 mos  - TSH with free T4 reflex  - T4 free  - **TSH with free T4 reflex FUTURE 2mo; Future  - levothyroxine (SYNTHROID/LEVOTHROID) 75 MCG tablet; Take 1 tablet (75 mcg) by mouth daily  Dispense: 30 tablet; Refill: 1    5. Chronic non-seasonal allergic rhinitis, unspecified trigger  refill  - budesonide (PULMICORT) 0.5 MG/2ML neb solution; Spray 2 mLs (0.5 mg) in nostril daily Use with a sinus rinse  Dispense: 90 ampule; Refill: 3    6. Need for prophylactic vaccination and inoculation against influenza    - FLU VAC, SPLIT VIRUS IM > 3 YO (QUADRIVALENT) [70194]  - Vaccine Administration, Initial [78173]    7. Encounter for immunization    - Pneumococcal vaccine 13 valent PCV13 IM (Prevnar) [71924]  - Pneumococcal vaccine 23 valent PPSV23  (Pneumovax) [20044]; Future  - EA ADD'L VACCINE    8. Visit for screening mammogram    - MA SCREENING DIGITAL BILAT - Future  (s+30); Future    COUNSELING:  Reviewed preventive health counseling, as reflected in patient instructions         reports that she has never smoked. She has never used smokeless tobacco.    Estimated body mass index is 27.93 kg/(m^2) as calculated from the following:    Height as of this encounter: 5' 2\" (1.575 m).    Weight as of this encounter: 152 lb 11.2 oz (69.3 kg).   Weight management plan: Discussed healthy diet and exercise guidelines and patient will follow up in 12 months in clinic to re-evaluate.    Counseling Resources:  ATP IV Guidelines  Pooled " Cohorts Equation Calculator  Breast Cancer Risk Calculator  FRAX Risk Assessment  ICSI Preventive Guidelines  Dietary Guidelines for Americans, 2010  [employer] MyPlate  ASA Prophylaxis  Lung CA Screening    Yasmine Richards MD  [facility]

## 2017-10-10 NOTE — MR AVS SNAPSHOT
After Visit Summary   10/10/2017    Mae Connors    MRN: [id number]           Patient Information     Date Of Birth          1960        Visit Information        Provider Department      10/10/2017 3:50 PM Yasmine Richards MD [facility]        Today's Diagnoses     Routine general medical examination at a health care facility    -  1    Visit for screening mammogram        Need for prophylactic vaccination and inoculation against influenza        Encounter for immunization        Rheumatoid arthritis involving both hands with positive rheumatoid factor (H)        Major depressive disorder, recurrent episode, mild (H)        Other specified hypothyroidism        Chronic non-seasonal allergic rhinitis, unspecified trigger          Care Instructions      Preventive Health Recommendations  Female Ages 50 - 64    Yearly exam: See your health care provider every year in order to  o Review health changes.   o Discuss preventive care.    o Review your medicines if your doctor has prescribed any.        If you get Pap tests with HPV test, you only need to test every 5 years, unless you have an abnormal result. You are due in 2019      Have a mammogram every 1 to 2 years.  You are due.    Have a colonoscopy in 2022.     Have a cholesterol test every 5 years, or more often if advised.  You are due in 2019    Have a diabetes test (fasting glucose) every three years. If you are at risk for diabetes, you should have this test more often.     If you are at risk for osteoporosis (brittle bone disease), think about having a bone density scan (DEXA).  I would recommend checking this in 2-3 yrs or sooner if rheumatology recommends.    Shots: Get a flu shot each year. Get a tetanus shot every 10 years.  Yours is due in 2019    Nutrition:     Eat at least 5 servings of fruits and vegetables each day.    Eat whole-grain bread, whole-wheat pasta and brown rice instead of white grains and rice.    Talk to  your provider about Calcium and Vitamin D.     Lifestyle    Exercise at least 150 minutes a week (30 minutes a day, 5 days a week). This will help you control your weight and prevent disease.    Limit alcohol to one drink per day.    No smoking.     Wear sunscreen to prevent skin cancer.     See your dentist every six months for an exam and cleaning.    See your eye doctor every 1 to 2 years.            Follow-ups after your visit        Follow-up notes from your care team     Return in about 1 year (around 10/10/2018) for Physical Exam.      Your next 10 appointments already scheduled     Jan 23, 2018  7:30 AM CST   Return Visit with Felipe Reyna MD   [facility] ([facility])    [address], [address]  [facility] [place] [address] [phone]           Please inform patient of the clinic address: [facility] [place] [address], [address] [facility] [place] [address]              Future tests that were ordered for you today     Open Future Orders        Priority Expected Expires Ordered    Pneumococcal vaccine 23 valent PPSV23  (Pneumovax) [03344] Routine 6/10/2018 10/10/2018 10/10/2017    MA SCREENING DIGITAL BILAT - Future  (s+30) Routine  10/10/2018 10/10/2017            Who to contact     If you have questions or need follow up information about today's clinic visit or your schedule please contact [facility] directly at [phone].  Normal or non-critical lab and imaging results will be communicated to you by MyChart, letter or phone within 4 business days after the clinic has received the results. If you do not hear from us within 7 days, please contact the clinic through MyChart or phone. If you have a critical or abnormal lab result, we will notify you by phone as soon as possible.  Submit refill requests through [employer] or call your pharmacy and they will forward the refill request to us. Please allow 3  "business days for your refill to be completed.          Additional Information About Your Visit        MyChart Information     [employer] gives you secure access to your electronic health record. If you see a primary care provider, you can also send messages to your care team and make appointments. If you have questions, please call your primary care clinic.  If you do not have a primary care provider, please call [phone] and they will assist you.        Care EveryWhere ID     This is your Care EveryWhere ID. This could be used by other organizations to access your [place] medical records  [id number]        Your Vitals Were     Pulse Temperature Height Last Period Pulse Oximetry BMI (Body Mass Index)    72 98.1  F (36.7  C) (Oral) 5' 2\" (1.575 m) 10/13/2014 (Approximate) 99% 27.93 kg/m2       Blood Pressure from Last 3 Encounters:   10/10/17 110/72   10/10/17 114/70   08/11/17 (!) 88/58    Weight from Last 3 Encounters:   10/10/17 152 lb 11.2 oz (69.3 kg)   10/10/17 153 lb (69.4 kg)   08/11/17 148 lb 11.2 oz (67.4 kg)              We Performed the Following     Full Code     Pneumococcal vaccine 13 valent PCV13 IM (Prevnar) [57428]     TSH with free T4 reflex          Today's Medication Changes          These changes are accurate as of: 10/10/17  4:48 PM.  If you have any questions, ask your nurse or doctor.               Start taking these medicines.        Dose/Directions    budesonide 0.5 MG/2ML neb solution   Commonly known as:  PULMICORT   Used for:  Chronic non-seasonal allergic rhinitis, unspecified trigger   Started by:  Yasmine Richards MD        Dose:  0.5 mg   Spray 2 mLs (0.5 mg) in nostril daily Use with a sinus rinse   Quantity:  90 ampule   Refills:  3            Where to get your medicines      These medications were sent to [facility] [address] [place], [place] - [address], [facility] [address]     Phone:  [phone]     " budesonide 0.5 MG/2ML neb solution    methotrexate 2.5 MG tablet CHEMO                Primary Care Provider Office Phone # Fax #    Yasmine Richards [phone] [address] [place] [address]        Equal Access to Services     INES WILCOX : Hadii aad ku hadmarko Soparisali, waaxda luqadaha, qaybta kaalmada adeegyada, waxmihaela bijanin ashantin adejaspal cooney vaishali thao. So [facility] [phone].    ATENCIÓN: Si habla español, tiene a grant disposición servicios gratuitos de asistencia lingüística. Llame al [phone].    We comply with applicable federal civil rights laws and [place] laws. We do not discriminate on the basis of race, color, national origin, age, disability, sex, sexual orientation, or gender identity.            Thank you!     Thank you for choosing [facility]  for your care. Our goal is always to provide you with excellent care. Hearing back from our patients is one way we can continue to improve our services. Please take a few minutes to complete the written survey that you may receive in the mail after your visit with us. Thank you!             Your Updated Medication List - Protect others around you: Learn how to safely use, store and throw away your medicines at www.disposemymeds.org.          This list is accurate as of: 10/10/17  4:48 PM.  Always use your most recent med list.                   Brand Name Dispense Instructions for use Diagnosis    budesonide 0.5 MG/2ML neb solution    PULMICORT    90 ampule    Spray 2 mLs (0.5 mg) in nostril daily Use with a sinus rinse    Chronic non-seasonal allergic rhinitis, unspecified trigger       cetirizine 10 MG tablet    zyrTEC    90 tablet    Take 1 tablet by mouth every evening.    Allergic rhinitis, cause unspecified       folic acid 1 MG tablet    FOLVITE    100 tablet    Take 1 tablet (1 mg) by mouth daily    Rheumatoid arthritis involving both hands with positive rheumatoid factor (H)       levothyroxine 88 MCG  tablet    SYNTHROID/LEVOTHROID    90 tablet    Take 1 tablet (88 mcg) by mouth daily    Other specified hypothyroidism       methotrexate 2.5 MG tablet CHEMO     40 tablet    Take 8 tablets (20 mg) by mouth once a week    Rheumatoid arthritis involving both hands with positive rheumatoid factor (H)

## 2017-10-10 NOTE — MR AVS SNAPSHOT
After Visit Summary   10/10/2017    Mae Connors    MRN: [id number]           Patient Information     Date Of Birth          1960        Visit Information        Provider Department      10/10/2017 8:00 AM Felipe Reyna MD [facility]        Today's Diagnoses     Rheumatoid arthritis involving both hands with positive rheumatoid factor (H)           Follow-ups after your visit        Follow-up notes from your care team     Return in about 3 months (around 1/10/2018).      Your next 10 appointments already scheduled     Oct 10, 2017  3:50 PM CDT   PHYSICAL with Yasmine Richards MD   [facility]    [address]  [address]  [place] [address]   [phone]            Jan 23, 2018  7:30 AM CST   Return Visit with Felipe Reyna MD   [facility] ([facility])    [address]  [place] [address] [phone]           Please inform patient of the clinic address: [facility] [address] , [address] [facility] [place] [address]              Who to contact     If you have questions or need follow up information about today's clinic visit or your schedule please contact [facility] directly at [phone].  Normal or non-critical lab and imaging results will be communicated to you by MyChart, letter or phone within 4 business days after the clinic has received the results. If you do not hear from us within 7 days, please contact the clinic through MyChart or phone. If you have a critical or abnormal lab result, we will notify you by phone as soon as possible.  Submit refill requests through [employer] or call your pharmacy and they will forward the refill request to us. Please allow 3 business days for your refill to be completed.          Additional Information About Your Visit        [facility]  Information     [employer] [facility] gives you secure access to your electronic health record. If you see a primary care provider, you can also send messages to your care team and make appointments. If you have questions, please call your primary care clinic.  If you do not have a primary care provider, please call [phone] and they will assist you.        Care EveryWhere ID     This is your Care EveryWhere ID. This could be used by other organizations to access your [place] medical records  [id number]        Your Vitals Were     Pulse Last Period Pulse Oximetry BMI (Body Mass Index)          67 10/13/2014 (Approximate) 98% 27.98 kg/m2         Blood Pressure from Last 3 Encounters:   10/10/17 114/70   08/11/17 (!) 88/58   08/03/17 96/52    Weight from Last 3 Encounters:   10/10/17 69.4 kg (153 lb)   08/11/17 67.4 kg (148 lb 11.2 oz)   08/03/17 68.6 kg (151 lb 3.2 oz)              Today, you had the following     No orders found for display         Where to get your medicines      These medications were sent to [facility] [address]  [address], [facility] [address]     Phone:  [phone]     methotrexate 2.5 MG tablet CHEMO          Primary Care Provider Office Phone # Fax #    Yasmine Richards [phone] [address] [place] [address]        Equal Access to Services     INES WILCOX AH: Hadii aad ku hadasho Soomaali, waaxda luqadaha, qaybta kaalmada adeegyada, geovany thao. So [facility] [phone].    ATENCIÓN: Si habla español, tiene a grant disposición servicios gratuitos de asistencia lingüística. Llame al [phone].    We comply with applicable federal civil rights laws and [place] laws. We do not discriminate on the basis of race, color, national origin, age, disability, sex, sexual orientation, or gender identity.            Thank you!     Thank you for choosing  [facility]  for your care. Our goal is always to provide you with excellent care. Hearing back from our patients is one way we can continue to improve our services. Please take a few minutes to complete the written survey that you may receive in the mail after your visit with us. Thank you!             Your Updated Medication List - Protect others around you: Learn how to safely use, store and throw away your medicines at www.disposemymeds.org.          This list is accurate as of: 10/10/17  8:20 AM.  Always use your most recent med list.                   Brand Name Dispense Instructions for use Diagnosis    BUDESONIDE (NASAL) NA      [place] in nostril daily 1 VIAL IN BTEH POT        buPROPion 150 MG 24 hr tablet    WELLBUTRIN XL    180 tablet    Take 1 tablet (150 mg) by mouth every morning    Major depressive disorder, recurrent episode, mild (H)       cetirizine 10 MG tablet    zyrTEC    90 tablet    Take 1 tablet by mouth every evening.    Allergic rhinitis, cause unspecified       folic acid 1 MG tablet    FOLVITE    100 tablet    Take 1 tablet (1 mg) by mouth daily    Rheumatoid arthritis involving both hands with positive rheumatoid factor (H)       levothyroxine 88 MCG tablet    SYNTHROID/LEVOTHROID    90 tablet    Take 1 tablet (88 mcg) by mouth daily    Other specified hypothyroidism       methotrexate 2.5 MG tablet CHEMO     40 tablet    Take 8 tablets (20 mg) by mouth once a week    Rheumatoid arthritis involving both hands with positive rheumatoid factor (H)

## 2017-10-10 NOTE — PROGRESS NOTES
SUBJECTIVE:  Ms. Bharat Connors is seen back in followup for her seropositive rheumatoid arthritis.  She is to notice perhaps a little bit more achiness.  She is wondering if dietary modifications will make any difference and we spent some time discussing this, although the evidence of less than clear.  She denies any side effects of methotrexate such as nausea, vomiting, diarrhea, stomatitis, there is no progressive dyspnea or chronic cough.  At this point, she does not think she wants to make a change to her medical regimen, but thinks she may be doing slightly worse than the last time I saw her with more stiffness and discomfort and possibly swelling of the wrists.      PHYSICAL EXAMINATION:   VITAL SIGNS:  Blood pressure 114/70, pulse 67, weight 153.     EXTREMITIES:  I do not think there is mild to moderate synovitis of the left wrist and mild of the right wrist.  None in the MCPs or PIPs.   SKIN:  Without lesions.      IMPRESSION:  Seropositive rheumatoid arthritis.      RECOMMENDATIONS:   1.  We will continue methotrexate 20 mg weekly.   2.  Check methotrexate labs at the end of the month.   3.  If symptoms continue to worsen or she becomes more symptomatic and dietary modifications do make any difference, then I would consider increasing methotrexate 25 mg weekly.   4.  Follow up with me in 3 months.         TYLER MOLINA MD             D: 10/10/2017 11:57   T: 10/10/2017 14:59   MT: [id number]      Name:     BHARAT CONNORS   MRN:      [id number]        Account:      [id number]   :      1960           Visit Date:   10/10/2017      Document: [id number]

## 2017-10-10 NOTE — PATIENT INSTRUCTIONS
Preventive Health Recommendations  Female Ages 50 - 64    Yearly exam: See your health care provider every year in order to  o Review health changes.   o Discuss preventive care.    o Review your medicines if your doctor has prescribed any.        If you get Pap tests with HPV test, you only need to test every 5 years, unless you have an abnormal result. You are due in 2019      Have a mammogram every 1 to 2 years.  You are due.    Have a colonoscopy in 2022.     Have a cholesterol test every 5 years, or more often if advised.  You are due in 2019    Have a diabetes test (fasting glucose) every three years. If you are at risk for diabetes, you should have this test more often.     If you are at risk for osteoporosis (brittle bone disease), think about having a bone density scan (DEXA).  I would recommend checking this in 2-3 yrs or sooner if rheumatology recommends.    Shots: Get a flu shot each year. Get a tetanus shot every 10 years.  Yours is due in 2019    Nutrition:     Eat at least 5 servings of fruits and vegetables each day.    Eat whole-grain bread, whole-wheat pasta and brown rice instead of white grains and rice.    Talk to your provider about Calcium and Vitamin D.     Lifestyle    Exercise at least 150 minutes a week (30 minutes a day, 5 days a week). This will help you control your weight and prevent disease.    Limit alcohol to one drink per day.    No smoking.     Wear sunscreen to prevent skin cancer.     See your dentist every six months for an exam and cleaning.    See your eye doctor every 1 to 2 years.

## 2017-10-11 LAB
ALBUMIN SERPL-MCNC: 4 G/DL (ref 3.4–5)
ALP SERPL-CCNC: 90 U/L (ref 40–150)
ALT SERPL W P-5'-P-CCNC: 90 U/L (ref 0–50)
ANION GAP SERPL CALCULATED.3IONS-SCNC: 6 MMOL/L (ref 3–14)
AST SERPL W P-5'-P-CCNC: 84 U/L (ref 0–45)
BILIRUB SERPL-MCNC: 0.3 MG/DL (ref 0.2–1.3)
BUN SERPL-MCNC: 18 MG/DL (ref 7–30)
CALCIUM SERPL-MCNC: 9.1 MG/DL (ref 8.5–10.1)
CHLORIDE SERPL-SCNC: 105 MMOL/L (ref 94–109)
CO2 SERPL-SCNC: 30 MMOL/L (ref 20–32)
CREAT SERPL-MCNC: 0.77 MG/DL (ref 0.52–1.04)
GFR SERPL CREATININE-BSD FRML MDRD: 78 ML/MIN/1.7M2
GLUCOSE SERPL-MCNC: 90 MG/DL (ref 70–99)
POTASSIUM SERPL-SCNC: 3.8 MMOL/L (ref 3.4–5.3)
PROT SERPL-MCNC: 8 G/DL (ref 6.8–8.8)
SODIUM SERPL-SCNC: 141 MMOL/L (ref 133–144)
T4 FREE SERPL-MCNC: 1.11 NG/DL (ref 0.76–1.46)
TSH SERPL DL<=0.005 MIU/L-ACNC: 0.07 MU/L (ref 0.4–4)

## 2017-10-12 LAB — CENTROMERE IGG SER-ACNC: <0.2 AI (ref 0–0.9)

## 2017-10-13 RX ORDER — LEVOTHYROXINE SODIUM 75 UG/1
75 TABLET ORAL DAILY
Qty: 30 TABLET | Refills: 1 | Status: SHIPPED | OUTPATIENT
Start: 2017-10-13 | End: 2017-11-17

## 2017-11-10 ENCOUNTER — RADIANT APPOINTMENT (OUTPATIENT)
Dept: MAMMOGRAPHY | Facility: CLINIC | Age: 57
End: 2017-11-10
Attending: INTERNAL MEDICINE
Payer: COMMERCIAL

## 2017-11-10 DIAGNOSIS — Z12.31 VISIT FOR SCREENING MAMMOGRAM: ICD-10-CM

## 2017-11-10 PROCEDURE — G0202 SCR MAMMO BI INCL CAD: HCPCS | Mod: TC

## 2017-11-17 DIAGNOSIS — E03.8 OTHER SPECIFIED HYPOTHYROIDISM: ICD-10-CM

## 2017-11-21 RX ORDER — LEVOTHYROXINE SODIUM 75 UG/1
75 TABLET ORAL DAILY
Qty: 30 TABLET | Refills: 0 | Status: SHIPPED | OUTPATIENT
Start: 2017-11-21 | End: 2018-01-27

## 2017-11-21 NOTE — TELEPHONE ENCOUNTER
Medication is being filled for 1 time refill only due to:  Patient needs labs recheck on TSH. patient has lab only appointment scheduled 12/8/17    Serena Martins RN

## 2017-12-08 DIAGNOSIS — E03.8 OTHER SPECIFIED HYPOTHYROIDISM: ICD-10-CM

## 2017-12-08 PROCEDURE — 36415 COLL VENOUS BLD VENIPUNCTURE: CPT | Performed by: INTERNAL MEDICINE

## 2017-12-08 PROCEDURE — 84443 ASSAY THYROID STIM HORMONE: CPT | Performed by: INTERNAL MEDICINE

## 2017-12-09 LAB — TSH SERPL DL<=0.005 MIU/L-ACNC: 1.73 MU/L (ref 0.4–4)

## 2017-12-20 ENCOUNTER — TELEPHONE (OUTPATIENT)
Dept: RHEUMATOLOGY | Facility: CLINIC | Age: 57
End: 2017-12-20

## 2017-12-20 DIAGNOSIS — M05.741 RHEUMATOID ARTHRITIS INVOLVING BOTH HANDS WITH POSITIVE RHEUMATOID FACTOR (H): ICD-10-CM

## 2017-12-20 DIAGNOSIS — M05.741 RHEUMATOID ARTHRITIS INVOLVING BOTH HANDS WITH POSITIVE RHEUMATOID FACTOR (H): Primary | ICD-10-CM

## 2017-12-20 DIAGNOSIS — M05.742 RHEUMATOID ARTHRITIS INVOLVING BOTH HANDS WITH POSITIVE RHEUMATOID FACTOR (H): Primary | ICD-10-CM

## 2017-12-20 DIAGNOSIS — M05.742 RHEUMATOID ARTHRITIS INVOLVING BOTH HANDS WITH POSITIVE RHEUMATOID FACTOR (H): ICD-10-CM

## 2018-01-21 ENCOUNTER — E-VISIT (OUTPATIENT)
Dept: PEDIATRICS | Facility: CLINIC | Age: 58
End: 2018-01-21
Payer: COMMERCIAL

## 2018-01-21 DIAGNOSIS — M05.742 RHEUMATOID ARTHRITIS INVOLVING BOTH HANDS WITH POSITIVE RHEUMATOID FACTOR (H): ICD-10-CM

## 2018-01-21 DIAGNOSIS — M05.741 RHEUMATOID ARTHRITIS INVOLVING BOTH HANDS WITH POSITIVE RHEUMATOID FACTOR (H): ICD-10-CM

## 2018-01-21 DIAGNOSIS — D84.9 IMMUNOSUPPRESSION (H): ICD-10-CM

## 2018-01-21 DIAGNOSIS — J01.90 ACUTE NON-RECURRENT SINUSITIS, UNSPECIFIED LOCATION: Primary | ICD-10-CM

## 2018-01-21 PROCEDURE — 99444 ZZC PHYSICIAN ONLINE EVALUATION & MANAGEMENT SERVICE: CPT | Performed by: INTERNAL MEDICINE

## 2018-01-21 RX ORDER — DOXYCYCLINE 100 MG/1
100 CAPSULE ORAL 2 TIMES DAILY
Qty: 28 CAPSULE | Refills: 0 | Status: SHIPPED | OUTPATIENT
Start: 2018-01-21 | End: 2018-02-04

## 2018-01-27 DIAGNOSIS — E03.8 OTHER SPECIFIED HYPOTHYROIDISM: ICD-10-CM

## 2018-01-28 NOTE — TELEPHONE ENCOUNTER
"Requested Prescriptions   Pending Prescriptions Disp Refills     levothyroxine (SYNTHROID/LEVOTHROID) 75 MCG tablet  Last Written Prescription Date:  11/21/2017  Last Fill Quantity: 30 tablet,  # refills: 0   Last Office Visit with G provider:  10/10/2017   Future Office Visit:      30 tablet 0     Sig: Take 1 tablet (75 mcg) by mouth daily    Thyroid Protocol Passed    1/27/2018  8:46 PM       Passed - Patient is 12 years or older       Passed - Recent or future visit with authorizing provider's specialty    Patient had office visit in the last year or has a visit in the next 30 days with authorizing provider.  See \"Patient Info\" tab in inbasket, or \"Choose Columns\" in Meds & Orders section of the refill encounter.            Passed - Normal TSH on file in past 12 months    Recent Labs   Lab Test  12/08/17   1410   TSH  1.73             Passed - No active pregnancy on record    If patient is pregnant or has had a positive pregnancy test, please check TSH.         Passed - No positive pregnancy test in past 12 months    If patient is pregnant or has had a positive pregnancy test, please check TSH.            "

## 2018-02-01 RX ORDER — LEVOTHYROXINE SODIUM 75 UG/1
75 TABLET ORAL DAILY
Qty: 30 TABLET | Refills: 7 | Status: SHIPPED | OUTPATIENT
Start: 2018-02-01 | End: 2018-11-26

## 2018-02-01 NOTE — TELEPHONE ENCOUNTER
Prescription approved per [facility] Refill Protocol.    Kaya FOFANA RN, BSN, PHN  [place] [facility] RN

## 2018-02-02 ENCOUNTER — RADIANT APPOINTMENT (OUTPATIENT)
Dept: GENERAL RADIOLOGY | Facility: CLINIC | Age: 58
End: 2018-02-02
Attending: INTERNAL MEDICINE
Payer: COMMERCIAL

## 2018-02-02 ENCOUNTER — OFFICE VISIT (OUTPATIENT)
Dept: RHEUMATOLOGY | Facility: CLINIC | Age: 58
End: 2018-02-02
Payer: COMMERCIAL

## 2018-02-02 VITALS
HEART RATE: 69 BPM | HEIGHT: 62 IN | DIASTOLIC BLOOD PRESSURE: 58 MMHG | BODY MASS INDEX: 26.52 KG/M2 | OXYGEN SATURATION: 99 % | WEIGHT: 144.1 LBS | TEMPERATURE: 99.3 F | SYSTOLIC BLOOD PRESSURE: 100 MMHG

## 2018-02-02 DIAGNOSIS — M05.742 RHEUMATOID ARTHRITIS INVOLVING BOTH HANDS WITH POSITIVE RHEUMATOID FACTOR (H): ICD-10-CM

## 2018-02-02 DIAGNOSIS — M05.741 RHEUMATOID ARTHRITIS INVOLVING BOTH HANDS WITH POSITIVE RHEUMATOID FACTOR (H): ICD-10-CM

## 2018-02-02 DIAGNOSIS — M05.742 RHEUMATOID ARTHRITIS INVOLVING BOTH HANDS WITH POSITIVE RHEUMATOID FACTOR (H): Primary | ICD-10-CM

## 2018-02-02 DIAGNOSIS — M05.741 RHEUMATOID ARTHRITIS INVOLVING BOTH HANDS WITH POSITIVE RHEUMATOID FACTOR (H): Primary | ICD-10-CM

## 2018-02-02 LAB
BASOPHILS # BLD AUTO: 0 10E9/L (ref 0–0.2)
BASOPHILS NFR BLD AUTO: 0.4 %
DIFFERENTIAL METHOD BLD: NORMAL
EOSINOPHIL # BLD AUTO: 0.1 10E9/L (ref 0–0.7)
EOSINOPHIL NFR BLD AUTO: 1.6 %
ERYTHROCYTE [DISTWIDTH] IN BLOOD BY AUTOMATED COUNT: 13.3 % (ref 10–15)
ERYTHROCYTE [SEDIMENTATION RATE] IN BLOOD BY WESTERGREN METHOD: 18 MM/H (ref 0–30)
HCT VFR BLD AUTO: 36.8 % (ref 35–47)
HGB BLD-MCNC: 11.8 G/DL (ref 11.7–15.7)
LYMPHOCYTES # BLD AUTO: 2.6 10E9/L (ref 0.8–5.3)
LYMPHOCYTES NFR BLD AUTO: 51.9 %
MCH RBC QN AUTO: 29.2 PG (ref 26.5–33)
MCHC RBC AUTO-ENTMCNC: 32.1 G/DL (ref 31.5–36.5)
MCV RBC AUTO: 91 FL (ref 78–100)
MONOCYTES # BLD AUTO: 0.3 10E9/L (ref 0–1.3)
MONOCYTES NFR BLD AUTO: 6 %
NEUTROPHILS # BLD AUTO: 2 10E9/L (ref 1.6–8.3)
NEUTROPHILS NFR BLD AUTO: 40.1 %
PLATELET # BLD AUTO: 311 10E9/L (ref 150–450)
RBC # BLD AUTO: 4.04 10E12/L (ref 3.8–5.2)
WBC # BLD AUTO: 5 10E9/L (ref 4–11)

## 2018-02-02 PROCEDURE — 73130 X-RAY EXAM OF HAND: CPT | Mod: LT

## 2018-02-02 PROCEDURE — 85652 RBC SED RATE AUTOMATED: CPT | Performed by: INTERNAL MEDICINE

## 2018-02-02 PROCEDURE — 36415 COLL VENOUS BLD VENIPUNCTURE: CPT | Performed by: INTERNAL MEDICINE

## 2018-02-02 PROCEDURE — 99214 OFFICE O/P EST MOD 30 MIN: CPT | Performed by: INTERNAL MEDICINE

## 2018-02-02 PROCEDURE — 82565 ASSAY OF CREATININE: CPT | Performed by: INTERNAL MEDICINE

## 2018-02-02 PROCEDURE — 84460 ALANINE AMINO (ALT) (SGPT): CPT | Performed by: INTERNAL MEDICINE

## 2018-02-02 PROCEDURE — 84450 TRANSFERASE (AST) (SGOT): CPT | Performed by: INTERNAL MEDICINE

## 2018-02-02 PROCEDURE — 85025 COMPLETE CBC W/AUTO DIFF WBC: CPT | Performed by: INTERNAL MEDICINE

## 2018-02-02 RX ORDER — FOLIC ACID 1 MG/1
1 TABLET ORAL DAILY
Qty: 100 TABLET | Refills: 3 | Status: SHIPPED | OUTPATIENT
Start: 2018-02-02 | End: 2019-01-11

## 2018-02-02 ASSESSMENT — ROUTINE ASSESSMENT OF PATIENT INDEX DATA (RAPID3)
RAPID3 INTERPRETATION: REMISSION
TOTAL RAPID3 SCORE: 1.5

## 2018-02-02 NOTE — PROGRESS NOTES
[facility] - Rheumatology Clinic Visit     Mae Connors MRN# [id number]   YOB: 1960    Primary care provider: Yasmine Richards  Feb 2, 2018          Assessment and Plan:   # Rheumatoid arthritis (RF > 20 and ACPA positive >300) Dx 2016 by Dr. Reyna  # Ehrlichiosis  # Transaminase elevation  # Weight loss    CHINMAY neg 2016  URI: patient was prescribed doxycycline recently for URI. Low grade temp today. Skip methotrexate until URI completely resolves.     Weight loss: unintentional:  Wt Readings from Last 4 Encounters:   02/02/18 65.4 kg (144 lb 1.6 oz)   10/10/17 69.3 kg (152 lb 11.2 oz)   10/10/17 69.4 kg (153 lb)   08/11/17 67.4 kg (148 lb 11.2 oz)     Watch weight.     RA disease: moderate disease activity. This may be related to dose decrease of methotrexate few months ago. I recommended bilateral wrist cortisone injections. Patient willing. She will schedule a follow up appointment.     The labs, imaging from patient records are reviewed.     I will be back in touch with the patient through mychart/letter when results are available.     Most Recent Immunizations   Administered Date(s) Administered     Influenza (H1N1) 12/29/2009     Influenza (IIV3) PF 10/04/2013     Influenza Vaccine IM 3yrs+ 4 Valent IIV4 10/10/2017     Pneumo Conj 13-V (2010&after) 10/10/2017     TD (ADULT, 7+) 06/01/2003     TDAP Vaccine (Adacel) 02/17/2009       Orders Placed This Encounter   Procedures     XR Hand Left G/E 3 Views     CBC with platelets differential     AST     ALT     Creatinine     Erythrocyte sedimentation rate auto       Return in about 4 weeks (around 3/2/2018).    Medications Discontinued During This Encounter   Medication Reason     methotrexate 2.5 MG tablet CHEMO Reorder     folic acid (FOLVITE) 1 MG tablet Reorder     Current Outpatient Prescriptions   Medication Sig Dispense Refill     methotrexate 2.5 MG tablet CHEMO Take 6 tablets (15 mg) by mouth once a week 72 tablet 0     folic acid (FOLVITE) 1  MG tablet Take 1 tablet (1 mg) by mouth daily 100 tablet 3     levothyroxine (SYNTHROID/LEVOTHROID) 75 MCG tablet Take 1 tablet (75 mcg) by mouth daily 30 tablet 7     doxycycline monohydrate 100 MG capsule Take 1 capsule (100 mg) by mouth 2 times daily for 14 days 28 capsule 0     budesonide (PULMICORT) 0.5 MG/2ML neb solution Spray 2 mLs (0.5 mg) in nostril daily Use with a sinus rinse 90 ampule 3     cetirizine (ZYRTEC) 10 MG tablet Take 1 tablet by mouth every evening. 90 tablet 3     [DISCONTINUED] methotrexate 2.5 MG tablet CHEMO Take 8 tablets (20 mg) by mouth once a week 40 tablet 5       Clay Donohue MD  [facility]          Active Problem List:     Patient Active Problem List    Diagnosis Date Noted     Rheumatoid arthritis involving both hands with positive rheumatoid factor (H) 10/14/2016     Priority: Medium     CARDIOVASCULAR SCREENING; LDL GOAL LESS THAN 160 02/10/2010     Priority: Medium     Mild major depression (H) 05/20/2009     Priority: Medium     Hypothyroidism 11/06/2008     Priority: Medium     Female stress incontinence 06/07/2006     Priority: Medium     Allergic rhinitis 08/03/2005     Priority: Medium     Failed treatment with previous allergy shots  Problem list name updated by automated process. Provider to review              History of Present Illness:     Chief Complaint   Patient presents with     Establish Care       February 2, 2018    Onset: pt states that she is doing pretty well, only has pain when she does certain movements, has it in hands and finger. Dx 1-2 years ago with RA  Involved joints: hands and wrists  Pain scale:  1/10     Wakes the patient from sleep : No  Morning stiffness:No  Meds used:methotrexate, folic acid     Interim history  Since last visit:  1. Infections - Yes/ pt has been sick for over 2 weeks with cold and sinus infection  2. New symptoms/medical problem - No  3. Any side effects from Rheum medications -none  3. ER  visits/Hospitalizations/surgeries - No  4. Last PCP visit: 10/10/17    Wt Readings from Last 4 Encounters:   02/02/18 65.4 kg (144 lb 1.6 oz)   10/10/17 69.3 kg (152 lb 11.2 oz)   10/10/17 69.4 kg (153 lb)   08/11/17 67.4 kg (148 lb 11.2 oz)       No h/o myalgia, rash, swollen glands  No family or personal history of psoriasis, ulcerative colitis or chron's disease.   Patient denies any raynauds    No h/o persistent shortness of breath, cough, chest pain  No h/o persistent nausea, vomiting, constipation, diarrhea, abdominal pain  No h/o hematochezia, hematuria, hemoptysis    BP Readings from Last 3 Encounters:   02/02/18 100/58   10/10/17 110/72   10/10/17 114/70          Review of Systems:   Complete ROS negative except for symptoms mentioned in the HPI          Past Medical History:     Past Medical History:   Diagnosis Date     Allergic rhinitis, cause unspecified 1990    Hx of immunotherapy , failed     Family history of breast cancer in female     Sister BRCA pos but patient tested negative 7/2013     Gout 12/21/2012     Renal disease     incontinence     Thyroid disease     hypothyroid     Past Surgical History:   Procedure Laterality Date     COLONOSCOPY  8-13-12    [facility] Marcelo [facility]isco[facility] at [facility]     COLONOSCOPY  8/13/2012    Procedure: COLONOSCOPY;  Colonoscopy  ;  Surgeon: Marcelo Briscoe MD, MD;  Location:  GI     EXCISE MASS FOOT Left 7/12/2016    Procedure: EXCISE MASS FOOT;  Surgeon: Marcelo Vaughn DPM;  Location: [facility] NASAL/SINUS SCOPE W THER INSTILL  2005     SLING TRANSPUBO WITHOUT ANTERIOR COLPORRHAPHY  11/21/2011    Procedure:SLING TRANSPUBO WITHOUT ANTERIOR COLPORRHAPHY; Pubovaginal Sling; Surgeon:KENNETH NEGRO; Location: OR            Social History:     Social History     Occupational History      [employer]     Social History Main Topics     Smoking status: Never Smoker     Smokeless tobacco: Never Used     Alcohol use 0.0 - 0.6 oz/week     0 - 1 Standard  "drinks or equivalent per week      Comment: occ.     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            Family History:     Family History   Problem Relation Age of Onset     Unknown/Adopted Father      Coronary Artery Disease Father      CEREBROVASCULAR DISEASE Maternal Grandmother      Neurologic Disorder Maternal Grandmother      stroke,  in her 50s     CEREBROVASCULAR DISEASE Paternal Grandfather      Neurologic Disorder Paternal Grandfather      stroke     CANCER Paternal Grandmother      Unsure what type of cancer            Allergies:     Allergies   Allergen Reactions     Sulfa Drugs      Hives and trouble breathing            Medications:     Current Outpatient Prescriptions   Medication Sig Dispense Refill     methotrexate 2.5 MG tablet CHEMO Take 6 tablets (15 mg) by mouth once a week 72 tablet 0     folic acid (FOLVITE) 1 MG tablet Take 1 tablet (1 mg) by mouth daily 100 tablet 3     levothyroxine (SYNTHROID/LEVOTHROID) 75 MCG tablet Take 1 tablet (75 mcg) by mouth daily 30 tablet 7     doxycycline monohydrate 100 MG capsule Take 1 capsule (100 mg) by mouth 2 times daily for 14 days 28 capsule 0     budesonide (PULMICORT) 0.5 MG/2ML neb solution Spray 2 mLs (0.5 mg) in nostril daily Use with a sinus rinse 90 ampule 3     cetirizine (ZYRTEC) 10 MG tablet Take 1 tablet by mouth every evening. 90 tablet 3     [DISCONTINUED] methotrexate 2.5 MG tablet CHEMO Take 8 tablets (20 mg) by mouth once a week 40 tablet 5            Physical Exam:   Blood pressure 100/58, pulse 69, temperature 99.3  F (37.4  C), temperature source Oral, height 1.575 m (5' 2\"), weight 65.4 kg (144 lb 1.6 oz), last menstrual period 10/13/2014, SpO2 99 %.  Wt Readings from Last 4 Encounters:   18 65.4 kg (144 lb 1.6 oz)   10/10/17 69.3 kg (152 lb 11.2 oz)   10/10/17 69.4 kg (153 lb)   17 67.4 kg (148 lb 11.2 oz)       Constitutional: well-developed, " appearing stated age; cooperative  Eyes: PERRLA, normal conjunctiva, sclera  ENT: nl external ears, nose, lips.No mucous membrane lesions, normal saliva pool  Neck: no cervical lymphadenopathy  Resp: lungs clear to auscultation,   CV: RRR, no added sounds, no edema  GI: Abdomen soft and no tenderness  : not tested  Lymph: no cervical, supraclavicular or epitrochlear nodes  MS: Bilateral wrist synovitis with reduction of ROM.   All shoulder, elbow, wrist, MCP/PIP/DIP, hip, knee, ankle, and foot MTP/IP joints were examined and  found normal. No active synovitis or deformity. Full ROM.  No dactylitis,  tenosynovitis, enthesopathy.  Skin: no rash in exposed areas  Psych: nl judgement, orientation, memory, affect.         Data:         Clay Donohue MD    [facility]

## 2018-02-02 NOTE — MR AVS SNAPSHOT
After Visit Summary   2/2/2018    Mae Connors    MRN: [id number]           Patient Information     Date Of Birth          1960        Visit Information        Provider Department      2/2/2018 1:00 PM Clay Donohue MD [facility]        Today's Diagnoses     Rheumatoid arthritis involving both hands with positive rheumatoid factor (H)    -  1       Follow-ups after your visit        Follow-up notes from your care team     Return in about 3 months (around 5/2/2018).      Your next 10 appointments already scheduled     Feb 02, 2018  1:35 PM CST   (Arrive by 1:20 PM)   XR HAND LEFT G/E 3 VIEWS with EAXR1   [facility] ([facility])    [address]  [address]  [place] [address] [phone]           Please bring a list of your current medicines to your exam. (Include vitamins, minerals and over-thecounter medicines.) Leave your valuables at home.  Tell your doctor if there is a chance you may be pregnant.  You do not need to do anything special for this exam.              Who to contact     If you have questions or need follow up information about today's clinic visit or your schedule please contact [facility] directly at [phone].  Normal or non-critical lab and imaging results will be communicated to you by [employer], letter or phone within 4 business days after the clinic has received the results. If you do not hear from us within 7 days, please contact the clinic through [employer] or phone. If you have a critical or abnormal lab result, we will notify you by phone as soon as possible.  Submit refill requests through [employer] or call your pharmacy and they will forward the refill request to us. Please allow 3 business days for your refill to be completed.          Additional Information About Your Visit        [employer] Information     [employer] gives you secure access to your electronic health record. If you see a  "primary care provider, you can also send messages to your care team and make appointments. If you have questions, please call your primary care clinic.  If you do not have a primary care provider, please call [phone] and they will assist you.        Care EveryWhere ID     This is your Care EveryWhere ID. This could be used by other organizations to access your [facility] medical records  [id number]        Your Vitals Were     Pulse Temperature Height Last Period Pulse Oximetry BMI (Body Mass Index)    69 99.3  F (37.4  C) (Oral) 1.575 m (5' 2\") 10/13/2014 (Approximate) 99% 26.36 kg/m2       Blood Pressure from Last 3 Encounters:   02/02/18 100/58   10/10/17 110/72   10/10/17 114/70    Weight from Last 3 Encounters:   02/02/18 65.4 kg (144 lb 1.6 oz)   10/10/17 69.3 kg (152 lb 11.2 oz)   10/10/17 69.4 kg (153 lb)              We Performed the Following     ALT     AST     CBC with platelets differential     Creatinine     Erythrocyte sedimentation rate auto          Today's Medication Changes          These changes are accurate as of 2/2/18  1:32 PM.  If you have any questions, ask your nurse or doctor.               These medicines have changed or have updated prescriptions.        Dose/Directions    methotrexate 2.5 MG tablet CHEMO   This may have changed:  how much to take   Used for:  Rheumatoid arthritis involving both hands with positive rheumatoid factor (H)   Changed by:  Clay Donohue MD        Dose:  15 mg   Take 6 tablets (15 mg) by mouth once a week   Quantity:  72 tablet   Refills:  0            Where to get your medicines      These medications were sent to [facility] - [place], [place] - [address]  [address], [facility] [address]     Phone:  [phone]     folic acid 1 MG tablet    methotrexate 2.5 MG tablet CHEMO                Primary Care Provider Office Phone # Fax #    Yasmine Richards [phone] " [phone]       [address] [place] [address]        Equal Access to Services     [facility] JERI : Hadii aad ku hadmarkrosaura Nuryadriano, waerrolda shawnasriniha, qaybta nehashaylamirella rivers, geovany acevedokyalinh thao. So [facility] [phone].    ATENCIÓN: Si habla español, tiene a grant disposición servicios gratuitos de asistencia lingüística. Ngoziame al [phone].    We comply with applicable federal civil rights laws and [place] laws. We do not discriminate on the basis of race, color, national origin, age, disability, sex, sexual orientation, or gender identity.            Thank you!     Thank you for choosing [facility]  for your care. Our goal is always to provide you with excellent care. Hearing back from our patients is one way we can continue to improve our services. Please take a few minutes to complete the written survey that you may receive in the mail after your visit with us. Thank you!             Your Updated Medication List - Protect others around you: Learn how to safely use, store and throw away your medicines at www.disposemymeds.org.          This list is accurate as of 2/2/18  1:32 PM.  Always use your most recent med list.                   Brand Name Dispense Instructions for use Diagnosis    budesonide 0.5 MG/2ML neb solution    PULMICORT    90 ampule    Spray 2 mLs (0.5 mg) in nostril daily Use with a sinus rinse    Chronic non-seasonal allergic rhinitis, unspecified trigger       cetirizine 10 MG tablet    zyrTEC    90 tablet    Take 1 tablet by mouth every evening.    Allergic rhinitis, cause unspecified       doxycycline monohydrate 100 MG capsule     28 capsule    Take 1 capsule (100 mg) by mouth 2 times daily for 14 days    Acute non-recurrent sinusitis, unspecified location, Rheumatoid arthritis involving both hands with positive rheumatoid factor (H), Immunosuppression (H)       folic acid 1 MG tablet    FOLVITE    100 tablet    Take 1 tablet (1 mg) by mouth  daily    Rheumatoid arthritis involving both hands with positive rheumatoid factor (H)       levothyroxine 75 MCG tablet    SYNTHROID/LEVOTHROID    30 tablet    Take 1 tablet (75 mcg) by mouth daily    Other specified hypothyroidism       methotrexate 2.5 MG tablet CHEMO     72 tablet    Take 6 tablets (15 mg) by mouth once a week    Rheumatoid arthritis involving both hands with positive rheumatoid factor (H)

## 2018-02-02 NOTE — NURSING NOTE
"Chief Complaint   Patient presents with     [facility] Care       Initial /58 (BP Location: Right arm, Patient Position: Chair, Cuff Size: Adult Regular)  Pulse 69  Temp 99.3  F (37.4  C) (Oral)  Ht 1.575 m (5' 2\")  Wt 65.4 kg (144 lb 1.6 oz)  LMP 10/13/2014 (Approximate)  SpO2 99%  BMI 26.36 kg/m2 Estimated body mass index is 26.36 kg/(m^2) as calculated from the following:    Height as of this encounter: 1.575 m (5' 2\").    Weight as of this encounter: 65.4 kg (144 lb 1.6 oz).  Medication Reconciliation: complete    Have you ever seen a rheumatologist Yes Who Dr. Reyna When 10/10/17  Joint pain history  Onset: pt states that she is doing pretty well, only has pain when she does certain movements, has it in hands and finger. Dx 1-2 years ago with RA  Involved joints: hands and wrists  Pain scale:  1/10     Wakes the patient from sleep : No  Morning stiffness:No  Meds used:methotrexate, folic acid    Interim history  Since last visit:  1. Infections - Yes/ pt has been sick for over 2 weeks with cold and sinus infection  2. New symptoms/medical problem - No  3. Any side effects from Rheum medications -none  3. ER visits/Hospitalizations/surgeries - No  4. Last PCP visit: 10/10/17  Wt Readings from Last 4 Encounters:   02/02/18 65.4 kg (144 lb 1.6 oz)   10/10/17 69.3 kg (152 lb 11.2 oz)   10/10/17 69.4 kg (153 lb)   08/11/17 67.4 kg (148 lb 11.2 oz)     BP Readings from Last 3 Encounters:   02/02/18 100/58   10/10/17 110/72   10/10/17 114/70       "

## 2018-02-03 LAB
ALT SERPL W P-5'-P-CCNC: 23 U/L (ref 0–50)
AST SERPL W P-5'-P-CCNC: 22 U/L (ref 0–45)
CREAT SERPL-MCNC: 0.87 MG/DL (ref 0.52–1.04)
GFR SERPL CREATININE-BSD FRML MDRD: 67 ML/MIN/1.7M2

## 2018-02-09 NOTE — PROGRESS NOTES
Results released to [facility]:  Your liver enzymes have normalized. Good.   Basic blood cell counts, liver and kidney function labs within normal limits  Inflammatory markers are normal. Good.         Sincerely    Clay Donohue MD  [facility]

## 2018-03-16 DIAGNOSIS — F33.0 MAJOR DEPRESSIVE DISORDER, RECURRENT EPISODE, MILD (H): ICD-10-CM

## 2018-03-16 DIAGNOSIS — F32.0 MILD MAJOR DEPRESSION (H): ICD-10-CM

## 2018-03-16 RX ORDER — BUPROPION HYDROCHLORIDE 150 MG/1
150 TABLET ORAL EVERY MORNING
Qty: 180 TABLET | Refills: 3 | Status: CANCELLED | OUTPATIENT
Start: 2018-03-16

## 2018-03-16 NOTE — TELEPHONE ENCOUNTER
This request was received by the pharmacy [facility].    Thanks  Joe Snyder Coodinator

## 2018-03-16 NOTE — TELEPHONE ENCOUNTER
"Requested Prescriptions   Pending Prescriptions Disp Refills     buPROPion (WELLBUTRIN XL) 150 MG 24 hr tablet  (Discontinued)  Last Written Prescription Date:  12/20/2016  Last Fill Quantity: 180 tablet,  # refills: 3   Last office visit: 10/10/2017 with prescribing provider:      Felipe Reyna MD        Future Office Visit:   Next 5 appointments (look out 90 days)     Mar 19, 2018  4:30 PM CDT   [facility] Visit with Clay Donohue MD   [facility]    [address] [place] [address]   [phone]            Mar 20, 2018  3:30 PM CDT   Nurse Only with EA RN   [facility]    [address] [place] [address]  [address]  [facility] [address] [phone]                  180 tablet 3     Sig: Take 1 tablet (150 mg) by mouth every morning    SSRIs Protocol Failed    3/16/2018 10:20 AM       Failed - PHQ-9 score less than 5 in past 6 months    The PHQ-9 criteria is meant to fail. It requires a PHQ-9 score review  PHQ-9 SCORE 4/22/2016 12/20/2016 8/3/2017   Total Score - - -   Total Score MyChart - - -   Total Score 2 7 0     YOVANY-7 SCORE 8/3/2017   Total Score 0              Passed - Medication is Bupropion    If the medication is Bupropion (Wellbutrin), and the patient is taking for smoking cessation; OK to refill.         Passed - Patient is age 18 or older       Passed - No active pregnancy on record       Passed - No positive pregnancy test in last 12 months       Passed - Recent (6 mo) or future (30 days) visit within the authorizing provider's specialty    Patient had office visit in the last 6 months or has a visit in the next 30 days with authorizing provider or within the authorizing provider's specialty.  See \"Patient Info\" tab in inbasket, or \"Choose Columns\" in Meds & Orders section of the refill encounter.                "

## 2018-03-19 ENCOUNTER — OFFICE VISIT (OUTPATIENT)
Dept: RHEUMATOLOGY | Facility: CLINIC | Age: 58
End: 2018-03-19
Payer: COMMERCIAL

## 2018-03-19 VITALS
BODY MASS INDEX: 27.23 KG/M2 | DIASTOLIC BLOOD PRESSURE: 68 MMHG | SYSTOLIC BLOOD PRESSURE: 114 MMHG | HEIGHT: 62 IN | HEART RATE: 61 BPM | RESPIRATION RATE: 12 BRPM | OXYGEN SATURATION: 100 % | WEIGHT: 148 LBS

## 2018-03-19 DIAGNOSIS — M05.742 RHEUMATOID ARTHRITIS INVOLVING BOTH HANDS WITH POSITIVE RHEUMATOID FACTOR (H): ICD-10-CM

## 2018-03-19 DIAGNOSIS — M05.741 RHEUMATOID ARTHRITIS INVOLVING BOTH HANDS WITH POSITIVE RHEUMATOID FACTOR (H): ICD-10-CM

## 2018-03-19 PROCEDURE — 99213 OFFICE O/P EST LOW 20 MIN: CPT | Performed by: INTERNAL MEDICINE

## 2018-03-19 RX ORDER — PREDNISONE 5 MG/1
TABLET ORAL
Qty: 50 TABLET | Refills: 0 | Status: SHIPPED | OUTPATIENT
Start: 2018-03-19 | End: 2018-05-11

## 2018-03-19 NOTE — NURSING NOTE
"Chief Complaint   Patient presents with     RECHECK       Initial /68  Pulse 61  Resp 12  Ht 1.575 m (5' 2\")  Wt 67.1 kg (148 lb)  LMP 10/13/2014 (Approximate)  SpO2 100%  BMI 27.07 kg/m2 Estimated body mass index is 27.07 kg/(m^2) as calculated from the following:    Height as of this encounter: 1.575 m (5' 2\").    Weight as of this encounter: 67.1 kg (148 lb).  Medication Reconciliation: complete    Have you ever seen a rheumatologist YES Who Dr. Reyna /Dr Donohue  Joint pain history  Onset: 2016  Involved joints: fingers, wrists, knees are giving problems recently  Pain scale:  2/10     Wakes the patient from sleep : No  Morning stiffness:No  Meds used: methotrexate    Interim history  Since last visit:  1. Infections - No  2. New symptoms/medical problem - No  3. Any side effects from Rheum medications - none  3. ER visits/Hospitalizations/surgeries - No  4. Last PCP visit: 10/10/17  Wt Readings from Last 4 Encounters:   03/19/18 67.1 kg (148 lb)   02/02/18 65.4 kg (144 lb 1.6 oz)   10/10/17 69.3 kg (152 lb 11.2 oz)   10/10/17 69.4 kg (153 lb)     BP Readings from Last 3 Encounters:   03/19/18 114/68   02/02/18 100/58   10/10/17 110/72       "

## 2018-03-19 NOTE — PROGRESS NOTES
[place] - Rheumatology Clinic Visit     Mae Connors MRN# [id number]   YOB: 1960    Primary care provider: Yasmine Richards  Mar 19, 2018          Assessment and Plan:   # Rheumatoid arthritis (RF > 20 and ACPA positive >300) Dx 2016 by Dr. Reyna  # Ehrlichiosis  # Transaminase elevation  # Weight loss    We discussed lab results today:  Your liver enzymes have normalized. Good.   Basic blood cell counts, liver and kidney function labs within normal limits   Inflammatory markers are normal. Good.     CHINMAY neg 2016    Wt Readings from Last 4 Encounters:   03/19/18 67.1 kg (148 lb)   02/02/18 65.4 kg (144 lb 1.6 oz)   10/10/17 69.3 kg (152 lb 11.2 oz)   10/10/17 69.4 kg (153 lb)     RA disease: moderate disease activity. This may be related to dose decrease of methotrexate several months ago. Recommend increasing methotrexate back to 20mg PO weekly. Labs in 4 weeks.   Prednisone burst for the flare. Side effects discussed.   Continue folic acid 1mg PO daily.     The labs, imaging from patient records are reviewed.     I will be back in touch with the patient through mychart/letter when results are available.     Most Recent Immunizations   Administered Date(s) Administered     Influenza (H1N1) 12/29/2009     Influenza (IIV3) PF 10/04/2013     Influenza Vaccine IM 3yrs+ 4 Valent IIV4 10/10/2017     Pneumo Conj 13-V (2010&after) 10/10/2017     TD (ADULT, 7+) 06/01/2003     TDAP Vaccine (Adacel) 02/17/2009     Orders Placed This Encounter   Procedures     CBC with platelets differential     AST     ALT     Creatinine     Return in about 6 weeks (around 4/30/2018).    Medications Discontinued During This Encounter   Medication Reason     methotrexate 2.5 MG tablet CHEMO Reorder     Current Outpatient Prescriptions   Medication Sig Dispense Refill     methotrexate 2.5 MG tablet CHEMO Take 8 tablets (20 mg) by mouth once a week 96 tablet 0     predniSONE (DELTASONE) 5 MG tablet Take 15mg PO daily X 1 week;  then 10mg PO daily X 1 week; 5mg PO daily X 1 week 50 tablet 0     folic acid (FOLVITE) 1 MG tablet Take 1 tablet (1 mg) by mouth daily 100 tablet 3     levothyroxine (SYNTHROID/LEVOTHROID) 75 MCG tablet Take 1 tablet (75 mcg) by mouth daily 30 tablet 7     cetirizine (ZYRTEC) 10 MG tablet Take 1 tablet by mouth every evening. 90 tablet 3     [DISCONTINUED] methotrexate 2.5 MG tablet CHEMO Take 6 tablets (15 mg) by mouth once a week 72 tablet 0     budesonide (PULMICORT) 0.5 MG/2ML neb solution Spray 2 mLs (0.5 mg) in nostril daily Use with a sinus rinse (Patient not taking: Reported on 3/19/2018) 90 ampule 3       Clay Donohue MD  [facility]          Active Problem List:     Patient Active Problem List    Diagnosis Date Noted     Rheumatoid arthritis involving both hands with positive rheumatoid factor (H) 10/14/2016     Priority: Medium     CARDIOVASCULAR SCREENING; LDL GOAL LESS THAN 160 02/10/2010     Priority: Medium     Mild major depression (H) 05/20/2009     Priority: Medium     Hypothyroidism 11/06/2008     Priority: Medium     Female stress incontinence 06/07/2006     Priority: Medium     Allergic rhinitis 08/03/2005     Priority: Medium     Failed treatment with previous allergy shots  Problem list name updated by automated process. Provider to review              History of Present Illness:     Chief Complaint   Patient presents with     RECHECK       February 2, 2018    Onset: pt states that she is doing pretty well, only has pain when she does certain movements, has it in hands and finger. Dx 1-2 years ago with RA  Involved joints: hands and wrists  Pain scale:  1/10     Wakes the patient from sleep : No  Morning stiffness:No  Meds used:methotrexate, folic acid     Interim history  Since last visit:  1. Infections - Yes/ pt has been sick for over 2 weeks with cold and sinus infection  2. New symptoms/medical problem - No  3. Any side effects from Rheum medications -none  3. ER  visits/Hospitalizations/surgeries - No  4. Last PCP visit: 10/10/17    Wt Readings from Last 4 Encounters:   03/19/18 67.1 kg (148 lb)   02/02/18 65.4 kg (144 lb 1.6 oz)   10/10/17 69.3 kg (152 lb 11.2 oz)   10/10/17 69.4 kg (153 lb)       No h/o myalgia, rash, swollen glands  No family or personal history of psoriasis, ulcerative colitis or chron's disease.   Patient denies any raynauds    No h/o persistent shortness of breath, cough, chest pain  No h/o persistent nausea, vomiting, constipation, diarrhea, abdominal pain  No h/o hematochezia, hematuria, hemoptysis    March 19, 2018  Have you ever seen a rheumatologist YES Who Dr. Reyna /Dr Donohue  Joint pain history  Onset: 2016  Involved joints: fingers, wrists, knees are giving problems recently  Pain scale:  2/10     Wakes the patient from sleep : No  Morning stiffness:No  Meds used: methotrexate     Interim history  Since last visit:  1. Infections - No  2. New symptoms/medical problem - No  3. Any side effects from Rheum medications - none  3. ER visits/Hospitalizations/surgeries - No  4. Last PCP visit: 10/10/17    BP Readings from Last 3 Encounters:   03/19/18 114/68   02/02/18 100/58   10/10/17 110/72          Review of Systems:   Complete ROS negative except for symptoms mentioned in the HPI          Past Medical History:     Past Medical History:   Diagnosis Date     Allergic rhinitis, cause unspecified 1990    Hx of immunotherapy , failed     Family history of breast cancer in female     Sister BRCA pos but patient tested negative 7/2013     Gout 12/21/2012     Renal disease     incontinence     Thyroid disease     hypothyroid     Past Surgical History:   Procedure Laterality Date     COLONOSCOPY  8-13-12    Dr. Marcelo Briscoe at [facility]     COLONOSCOPY  8/13/2012    Procedure: COLONOSCOPY;  Colonoscopy  ;  Surgeon: Marcelo Briscoe MD, MD;  Location:  GI     EXCISE MASS FOOT Left 7/12/2016    Procedure: EXCISE MASS FOOT;  Surgeon: Marcelo Vaughn DPM;   Location: [facility]     HC NASAL/SINUS SCOPE W THER INSTILL       SLING TRANSPUBO WITHOUT ANTERIOR COLPORRHAPHY  2011    Procedure:SLING TRANSPUBO WITHOUT ANTERIOR COLPORRHAPHY; Pubovaginal Sling; Surgeon:KENNETH NEGRO; Location:[facility] OR            Social History:     Social History     Occupational History      [employer]     Social History Main Topics     Smoking status: Never Smoker     Smokeless tobacco: Never Used     Alcohol use 0.0 - 0.6 oz/week     0 - 1 Standard drinks or equivalent per week      Comment: occ.     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            Family History:     Family History   Problem Relation Age of Onset     Unknown/Adopted Father      Coronary Artery Disease Father      CEREBROVASCULAR DISEASE Maternal Grandmother      Neurologic Disorder Maternal Grandmother      stroke,  in her 50s     CEREBROVASCULAR DISEASE Paternal Grandfather      Neurologic Disorder Paternal Grandfather      stroke     CANCER Paternal Grandmother      Unsure what type of cancer            Allergies:     Allergies   Allergen Reactions     Sulfa Drugs      Hives and trouble breathing            Medications:     Current Outpatient Prescriptions   Medication Sig Dispense Refill     methotrexate 2.5 MG tablet CHEMO Take 8 tablets (20 mg) by mouth once a week 96 tablet 0     predniSONE (DELTASONE) 5 MG tablet Take 15mg PO daily X 1 week; then 10mg PO daily X 1 week; 5mg PO daily X 1 week 50 tablet 0     folic acid (FOLVITE) 1 MG tablet Take 1 tablet (1 mg) by mouth daily 100 tablet 3     levothyroxine (SYNTHROID/LEVOTHROID) 75 MCG tablet Take 1 tablet (75 mcg) by mouth daily 30 tablet 7     cetirizine (ZYRTEC) 10 MG tablet Take 1 tablet by mouth every evening. 90 tablet 3     [DISCONTINUED] methotrexate 2.5 MG tablet CHEMO Take 6 tablets (15 mg) by mouth once a week 72 tablet 0     budesonide (PULMICORT) 0.5  "MG/2ML neb solution Spray 2 mLs (0.5 mg) in nostril daily Use with a sinus rinse (Patient not taking: Reported on 3/19/2018) 90 ampule 3            Physical Exam:   Blood pressure 114/68, pulse 61, resp. rate 12, height 1.575 m (5' 2\"), weight 67.1 kg (148 lb), last menstrual period 10/13/2014, SpO2 100 %.  Wt Readings from Last 4 Encounters:   03/19/18 67.1 kg (148 lb)   02/02/18 65.4 kg (144 lb 1.6 oz)   10/10/17 69.3 kg (152 lb 11.2 oz)   10/10/17 69.4 kg (153 lb)       Constitutional: well-developed, appearing stated age; cooperative  MS: Bilateral wrist synovitis with reduction of ROM- right wrist is improved but left wrist still present.   Bilateral knee no effusion or synovitis.   Skin: no rash in exposed areas  Psych: nl judgement, orientation, memory, affect.         Data:         Clay Donohue MD    [facility]    "

## 2018-03-19 NOTE — MR AVS SNAPSHOT
After Visit Summary   3/19/2018    Mae Connors    MRN: [id number]           Patient Information     Date Of Birth          1960        Visit Information        Provider Department      3/19/2018 4:30 PM Clay Donohue MD [facility]        Today's Diagnoses     Rheumatoid arthritis involving both hands with positive rheumatoid factor (H)           Follow-ups after your visit        Follow-up notes from your care team     Return in about 6 weeks (around 4/30/2018).      Future tests that were ordered for you today     Open Future Orders        Priority Expected Expires Ordered    CBC with platelets differential Routine 4/8/2018 5/19/2018 3/19/2018    AST Routine 4/8/2018 5/19/2018 3/19/2018    ALT Routine 4/8/2018 5/19/2018 3/19/2018    Creatinine Routine 4/8/2018 5/19/2018 3/19/2018            Who to contact     If you have questions or need follow up information about today's clinic visit or your schedule please contact [facility] directly at [phone].  Normal or non-critical lab and imaging results will be communicated to you by ImmuRxhart, letter or phone within 4 business days after the clinic has received the results. If you do not hear from us within 7 days, please contact the clinic through ImmuRxhart or phone. If you have a critical or abnormal lab result, we will notify you by phone as soon as possible.  Submit refill requests through EMcube or call your pharmacy and they will forward the refill request to us. Please allow 3 business days for your refill to be completed.          Additional Information About Your Visit        MyChart Information     EMcube gives you secure access to your electronic health record. If you see a primary care provider, you can also send messages to your care team and make appointments. If you have questions, please call your primary care clinic.  If you do not have a primary care provider, please call [phone] and  "they will assist you.        Care EveryWhere ID     This is your Care EveryWhere ID. This could be used by other organizations to access your [facility] medical records  [id number]        Your Vitals Were     Pulse Respirations Height Last Period Pulse Oximetry BMI (Body Mass Index)    61 12 1.575 m (5' 2\") 10/13/2014 (Approximate) 100% 27.07 kg/m2       Blood Pressure from Last 3 Encounters:   03/19/18 114/68   02/02/18 100/58   10/10/17 110/72    Weight from Last 3 Encounters:   03/19/18 67.1 kg (148 lb)   02/02/18 65.4 kg (144 lb 1.6 oz)   10/10/17 69.3 kg (152 lb 11.2 oz)                 Today's Medication Changes          These changes are accurate as of 3/19/18  5:01 PM.  If you have any questions, ask your nurse or doctor.               Start taking these medicines.        Dose/Directions    predniSONE 5 MG tablet   Commonly known as:  DELTASONE   Used for:  Rheumatoid arthritis involving both hands with positive rheumatoid factor (H)   Started by:  Clay Donohue MD        Take 15mg PO daily X 1 week; then 10mg PO daily X 1 week; 5mg PO daily X 1 week   Quantity:  50 tablet   Refills:  0         These medicines have changed or have updated prescriptions.        Dose/Directions    methotrexate 2.5 MG tablet CHEMO   This may have changed:  how much to take   Used for:  Rheumatoid arthritis involving both hands with positive rheumatoid factor (H)   Changed by:  Clay Donohue MD        Dose:  20 mg   Take 8 tablets (20 mg) by mouth once a week   Quantity:  96 tablet   Refills:  0            Where to get your medicines      These medications were sent to [id number] - [place], [place] - [address]  [address], [facility] [address]     Phone:  [phone]     methotrexate 2.5 MG tablet CHEMO    predniSONE 5 MG tablet                Primary Care Provider Office Phone # Fax #    Yasmine Richards [phone] "       [address] [place] [address]        Equal Access to Services     INES WILCOX : Hadii aad ku hadmarkrosaura Swartz, waerrolmirella bates, cynthiachery amormageovany snider. So [facility] [phone].    ATENCIÓN: Si habla español, tiene a grant disposición servicios gratuitos de asistencia lingüística. [facility] [phone].    We comply with applicable federal civil rights laws and [place] laws. We do not discriminate on the basis of race, color, national origin, age, disability, sex, sexual orientation, or gender identity.            Thank you!     Thank you for choosing [facility]  for your care. Our goal is always to provide you with excellent care. Hearing back from our patients is one way we can continue to improve our services. Please take a few minutes to complete the written survey that you may receive in the mail after your visit with us. Thank you!             Your Updated Medication List - Protect others around you: Learn how to safely use, store and throw away your medicines at www.disposemymeds.org.          This list is accurate as of 3/19/18  5:01 PM.  Always use your most recent med list.                   Brand Name Dispense Instructions for use Diagnosis    budesonide 0.5 MG/2ML neb solution    PULMICORT    90 ampule    Spray 2 mLs (0.5 mg) in nostril daily Use with a sinus rinse    Chronic non-seasonal allergic rhinitis, unspecified trigger       cetirizine 10 MG tablet    zyrTEC    90 tablet    Take 1 tablet by mouth every evening.    Allergic rhinitis, cause unspecified       folic acid 1 MG tablet    FOLVITE    100 tablet    Take 1 tablet (1 mg) by mouth daily    Rheumatoid arthritis involving both hands with positive rheumatoid factor (H)       levothyroxine 75 MCG tablet    SYNTHROID/LEVOTHROID    30 tablet    Take 1 tablet (75 mcg) by mouth daily    Other specified hypothyroidism       methotrexate 2.5 MG tablet CHEMO     96 tablet     Take 8 tablets (20 mg) by mouth once a week    Rheumatoid arthritis involving both hands with positive rheumatoid factor (H)       predniSONE 5 MG tablet    DELTASONE    50 tablet    Take 15mg PO daily X 1 week; then 10mg PO daily X 1 week; 5mg PO daily X 1 week    Rheumatoid arthritis involving both hands with positive rheumatoid factor (H)

## 2018-05-08 DIAGNOSIS — M05.742 RHEUMATOID ARTHRITIS INVOLVING BOTH HANDS WITH POSITIVE RHEUMATOID FACTOR (H): ICD-10-CM

## 2018-05-08 DIAGNOSIS — M05.741 RHEUMATOID ARTHRITIS INVOLVING BOTH HANDS WITH POSITIVE RHEUMATOID FACTOR (H): ICD-10-CM

## 2018-05-08 LAB
BASOPHILS # BLD AUTO: 0 10E9/L (ref 0–0.2)
BASOPHILS NFR BLD AUTO: 0.4 %
DIFFERENTIAL METHOD BLD: ABNORMAL
EOSINOPHIL # BLD AUTO: 0.2 10E9/L (ref 0–0.7)
EOSINOPHIL NFR BLD AUTO: 3.3 %
ERYTHROCYTE [DISTWIDTH] IN BLOOD BY AUTOMATED COUNT: 14.7 % (ref 10–15)
HCT VFR BLD AUTO: 34.6 % (ref 35–47)
HGB BLD-MCNC: 11.2 G/DL (ref 11.7–15.7)
LYMPHOCYTES # BLD AUTO: 1.8 10E9/L (ref 0.8–5.3)
LYMPHOCYTES NFR BLD AUTO: 36.8 %
MCH RBC QN AUTO: 30.1 PG (ref 26.5–33)
MCHC RBC AUTO-ENTMCNC: 32.4 G/DL (ref 31.5–36.5)
MCV RBC AUTO: 93 FL (ref 78–100)
MONOCYTES # BLD AUTO: 0.4 10E9/L (ref 0–1.3)
MONOCYTES NFR BLD AUTO: 7.9 %
NEUTROPHILS # BLD AUTO: 2.5 10E9/L (ref 1.6–8.3)
NEUTROPHILS NFR BLD AUTO: 51.6 %
PLATELET # BLD AUTO: 292 10E9/L (ref 150–450)
RBC # BLD AUTO: 3.72 10E12/L (ref 3.8–5.2)
WBC # BLD AUTO: 4.8 10E9/L (ref 4–11)

## 2018-05-08 PROCEDURE — 84450 TRANSFERASE (AST) (SGOT): CPT | Performed by: INTERNAL MEDICINE

## 2018-05-08 PROCEDURE — 85025 COMPLETE CBC W/AUTO DIFF WBC: CPT | Performed by: INTERNAL MEDICINE

## 2018-05-08 PROCEDURE — 84460 ALANINE AMINO (ALT) (SGPT): CPT | Performed by: INTERNAL MEDICINE

## 2018-05-08 PROCEDURE — 82565 ASSAY OF CREATININE: CPT | Performed by: INTERNAL MEDICINE

## 2018-05-08 PROCEDURE — 36415 COLL VENOUS BLD VENIPUNCTURE: CPT | Performed by: INTERNAL MEDICINE

## 2018-05-08 NOTE — PROGRESS NOTES
[place] - Rheumatology Clinic Visit     Mae Connors MRN# [id number]   YOB: 1960    Primary care provider: Yasmine Richards  May 11, 2018          Assessment and Plan:   # Rheumatoid arthritis (RF > 20 and ACPA positive >300) Dx 2016 by Dr. Reyna  # Ehrlichiosis  # Transaminase elevation  # Weight loss    We discussed lab results today:  Your liver enzymes have normalized. Good.   Basic blood cell counts, liver and kidney function labs within normal limits   Inflammatory markers are normal. Good.     CHINMAY neg 2016    Wt Readings from Last 4 Encounters:   03/19/18 67.1 kg (148 lb)   02/02/18 65.4 kg (144 lb 1.6 oz)   10/10/17 69.3 kg (152 lb 11.2 oz)   10/10/17 69.4 kg (153 lb)     RA disease: moderate disease activity. This may be related to dose decrease of methotrexate several months ago. Recommend increasing methotrexate back to 20mg PO weekly. Labs in 4 weeks.   Prednisone burst for the flare. Side effects discussed.   Continue folic acid 1mg PO daily.     The labs, imaging from patient records are reviewed.     I will be back in touch with the patient through mychart/letter when results are available.     Most Recent Immunizations   Administered Date(s) Administered     Influenza (H1N1) 12/29/2009     Influenza (IIV3) PF 10/04/2013     Influenza Vaccine IM 3yrs+ 4 Valent IIV4 10/10/2017     Pneumo Conj 13-V (2010&after) 10/10/2017     TD (ADULT, 7+) 06/01/2003     TDAP Vaccine (Adacel) 02/17/2009     No orders of the defined types were placed in this encounter.    Data Unavailable    There are no discontinued medications.  Current Outpatient Prescriptions   Medication Sig Dispense Refill     budesonide (PULMICORT) 0.5 MG/2ML neb solution Spray 2 mLs (0.5 mg) in nostril daily Use with a sinus rinse (Patient not taking: Reported on 3/19/2018) 90 ampule 3     cetirizine (ZYRTEC) 10 MG tablet Take 1 tablet by mouth every evening. 90 tablet 3     folic acid (FOLVITE) 1 MG tablet Take 1 tablet (1 mg)  by mouth daily 100 tablet 3     levothyroxine (SYNTHROID/LEVOTHROID) 75 MCG tablet Take 1 tablet (75 mcg) by mouth daily 30 tablet 7     methotrexate 2.5 MG tablet CHEMO Take 8 tablets (20 mg) by mouth once a week 96 tablet 0     predniSONE (DELTASONE) 5 MG tablet Take 15mg PO daily X 1 week; then 10mg PO daily X 1 week; 5mg PO daily X 1 week 50 tablet 0       Emily Adamson LPN  [facility]          Active Problem List:     Patient Active Problem List    Diagnosis Date Noted     Rheumatoid arthritis involving both hands with positive rheumatoid factor (H) 10/14/2016     Priority: Medium     CARDIOVASCULAR SCREENING; LDL GOAL LESS THAN 160 02/10/2010     Priority: Medium     Mild major depression (H) 05/20/2009     Priority: Medium     Hypothyroidism 11/06/2008     Priority: Medium     Female stress incontinence 06/07/2006     Priority: Medium     Allergic rhinitis 08/03/2005     Priority: Medium     Failed treatment with previous allergy shots  Problem list name updated by automated process. Provider to review              History of Present Illness:     No chief complaint on file.      February 2, 2018    Onset: pt states that she is doing pretty well, only has pain when she does certain movements, has it in hands and finger. Dx 1-2 years ago with RA  Involved joints: hands and wrists  Pain scale:  1/10     Wakes the patient from sleep : No  Morning stiffness:No  Meds used:methotrexate, folic acid     Interim history  Since last visit:  1. Infections - Yes/ pt has been sick for over 2 weeks with cold and sinus infection  2. New symptoms/medical problem - No  3. Any side effects from Rheum medications -none  3. ER visits/Hospitalizations/surgeries - No  4. Last PCP visit: 10/10/17    Wt Readings from Last 4 Encounters:   03/19/18 67.1 kg (148 lb)   02/02/18 65.4 kg (144 lb 1.6 oz)   10/10/17 69.3 kg (152 lb 11.2 oz)   10/10/17 69.4 kg (153 lb)       No h/o myalgia, rash, swollen glands  No family or  personal history of psoriasis, ulcerative colitis or chron's disease.   Patient denies any raynauds    No h/o persistent shortness of breath, cough, chest pain  No h/o persistent nausea, vomiting, constipation, diarrhea, abdominal pain  No h/o hematochezia, hematuria, hemoptysis    March 19, 2018  Have you ever seen a rheumatologist YES Who Dr. Reyna /Dr Donohue  Joint pain history  Onset: 2016  Involved joints: fingers, wrists, knees are giving problems recently  Pain scale:  2/10     Wakes the patient from sleep : No  Morning stiffness:No  Meds used: methotrexate     Interim history  Since last visit:  1. Infections - No  2. New symptoms/medical problem - No  3. Any side effects from Rheum medications - none  3. ER visits/Hospitalizations/surgeries - No  4. Last PCP visit: 10/10/17    BP Readings from Last 3 Encounters:   03/19/18 114/68   02/02/18 100/58   10/10/17 110/72          Review of Systems:   Complete ROS negative except for symptoms mentioned in the HPI          Past Medical History:     Past Medical History:   Diagnosis Date     Allergic rhinitis, cause unspecified 1990    Hx of immunotherapy , failed     Family history of breast cancer in female     Sister BRCA pos but patient tested negative 7/2013     Gout 12/21/2012     Renal disease     incontinence     Thyroid disease     hypothyroid     Past Surgical History:   Procedure Laterality Date     COLONOSCOPY  8-13-12    Dr. Marcel[facility] at [facility]     COLONOSCOPY  8/13/2012    Procedure: COLONOSCOPY;  Colonoscopy  ;  Surgeon: Marcelo Briscoe MD, MD;  Location:  GI     EXCISE MASS FOOT Left 7/12/2016    Procedure: EXCISE MASS FOOT;  Surgeon: Marcelo Vaughn DPM;  Location: [facility] NASAL/SINUS SCOPE W THER INSTILL  2005     SLING TRANSPUBO WITHOUT ANTERIOR COLPORRHAPHY  11/21/2011    Procedure:SLING TRANSPUBO WITHOUT ANTERIOR COLPORRHAPHY; Pubovaginal Sling; Surgeon:KENNETH NEGRO; Location: OR            Social History:     Social  History     Occupational History      [employer]     Social History Main Topics     Smoking status: Never Smoker     Smokeless tobacco: Never Used     Alcohol use 0.0 - 0.6 oz/week     0 - 1 Standard drinks or equivalent per week      Comment: occ.     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            Family History:     Family History   Problem Relation Age of Onset     Unknown/Adopted Father      Coronary Artery Disease Father      CEREBROVASCULAR DISEASE Maternal Grandmother      Neurologic Disorder Maternal Grandmother      stroke,  in her 50s     CEREBROVASCULAR DISEASE Paternal Grandfather      Neurologic Disorder Paternal Grandfather      stroke     CANCER Paternal Grandmother      Unsure what type of cancer            Allergies:     Allergies   Allergen Reactions     Sulfa Drugs      Hives and trouble breathing            Medications:     Current Outpatient Prescriptions   Medication Sig Dispense Refill     budesonide (PULMICORT) 0.5 MG/2ML neb solution Spray 2 mLs (0.5 mg) in nostril daily Use with a sinus rinse (Patient not taking: Reported on 3/19/2018) 90 ampule 3     cetirizine (ZYRTEC) 10 MG tablet Take 1 tablet by mouth every evening. 90 tablet 3     folic acid (FOLVITE) 1 MG tablet Take 1 tablet (1 mg) by mouth daily 100 tablet 3     levothyroxine (SYNTHROID/LEVOTHROID) 75 MCG tablet Take 1 tablet (75 mcg) by mouth daily 30 tablet 7     methotrexate 2.5 MG tablet CHEMO Take 8 tablets (20 mg) by mouth once a week 96 tablet 0     predniSONE (DELTASONE) 5 MG tablet Take 15mg PO daily X 1 week; then 10mg PO daily X 1 week; 5mg PO daily X 1 week 50 tablet 0            Physical Exam:   Last menstrual period 10/13/2014.  Wt Readings from Last 4 Encounters:   18 67.1 kg (148 lb)   18 65.4 kg (144 lb 1.6 oz)   10/10/17 69.3 kg (152 lb 11.2 oz)   10/10/17 69.4 kg (153 lb)       Constitutional:  well-developed, appearing stated age; cooperative  MS: Bilateral wrist synovitis with reduction of ROM- right wrist is improved but left wrist still present.   Bilateral knee no effusion or synovitis.   Skin: no rash in exposed areas  Psych: nl judgement, orientation, memory, affect.         Data:         Emily Adamson LPN    [facility]

## 2018-05-09 LAB
ALT SERPL W P-5'-P-CCNC: 31 U/L (ref 0–50)
AST SERPL W P-5'-P-CCNC: 24 U/L (ref 0–45)
CREAT SERPL-MCNC: 0.86 MG/DL (ref 0.52–1.04)
GFR SERPL CREATININE-BSD FRML MDRD: 68 ML/MIN/1.7M2

## 2018-05-09 NOTE — PROGRESS NOTES
Results released to [facility]:  Basic blood cell counts, liver and kidney function labs within normal limits except borderline anemia which can be watched.   Anemia noted. We will discuss this during next visit. However, please seek medical attention if you have any signs of bleeding in stomach such as : blood in stool or black stool or blood in vomit.     Sincerely    Clay Donohue MD  [facility]

## 2018-05-11 ENCOUNTER — OFFICE VISIT (OUTPATIENT)
Dept: RHEUMATOLOGY | Facility: CLINIC | Age: 58
End: 2018-05-11
Payer: COMMERCIAL

## 2018-05-11 VITALS
DIASTOLIC BLOOD PRESSURE: 64 MMHG | HEART RATE: 68 BPM | TEMPERATURE: 98.3 F | OXYGEN SATURATION: 97 % | SYSTOLIC BLOOD PRESSURE: 112 MMHG | BODY MASS INDEX: 28.68 KG/M2 | WEIGHT: 156.8 LBS

## 2018-05-11 DIAGNOSIS — M19.032 ARTHRITIS OF LEFT WRIST: Primary | ICD-10-CM

## 2018-05-11 DIAGNOSIS — I02.0 RHEUMATIC CHOREA WITH HEART INVOLVEMENT: ICD-10-CM

## 2018-05-11 PROCEDURE — 99214 OFFICE O/P EST MOD 30 MIN: CPT | Performed by: INTERNAL MEDICINE

## 2018-05-11 RX ORDER — BUPROPION HYDROCHLORIDE 150 MG/1
TABLET ORAL
Refills: 3 | COMMUNITY
Start: 2017-12-11 | End: 2019-01-11

## 2018-05-11 ASSESSMENT — ROUTINE ASSESSMENT OF PATIENT INDEX DATA (RAPID3)
RAPID3 INTERPRETATION: LOW 3.1-6
TOTAL RAPID3 SCORE: 4

## 2018-05-11 NOTE — NURSING NOTE
"Chief Complaint   Patient presents with     RECHECK       Initial LMP 10/13/2014 (Approximate) Estimated body mass index is 27.07 kg/(m^2) as calculated from the following:    Height as of 3/19/18: 1.575 m (5' 2\").    Weight as of 3/19/18: 67.1 kg (148 lb).  Medication Reconciliation: complete    Have you ever seen a rheumatologist yes Who you When 03/19/2018   Joint pain history, arthritis  Onset:   Involved joints: all finger joints and bilateral wrists  Pain scale:  2/10     Wakes the patient from sleep : No  Morning stiffness:stiff all the time per pt  Meds used:methotrexate    Interim history  Since last visit:  1. Infections - No  2. New symptoms/medical problem - Yes, symptoms getting worse, pt states under a lot of stress and thinks it makes it worse, Prednisone did not help  3. Any side effects from Rheum medications -no  3. ER visits/Hospitalizations/surgeries - No  4. Last PCP visit: 10/10/2017  Wt Readings from Last 4 Encounters:   03/19/18 67.1 kg (148 lb)   02/02/18 65.4 kg (144 lb 1.6 oz)   10/10/17 69.3 kg (152 lb 11.2 oz)   10/10/17 69.4 kg (153 lb)     BP Readings from Last 3 Encounters:   03/19/18 114/68   02/02/18 100/58   10/10/17 110/72     "

## 2018-05-11 NOTE — PROGRESS NOTES
[facility] - Rheumatology Clinic Visit     Mae Connors MRN# [id number]   YOB: 1960    Primary care provider: Yasmine Richards  May 11, 2018          Assessment and Plan:   # Rheumatoid arthritis (RF > 20 and ACPA positive >300) Dx 2016 by Dr. Reyna  # Ehrlichiosis  # Transaminase elevation    We discussed recent lab results today:  Your liver enzymes have normalized. Good.   Basic blood cell counts, liver and kidney function labs within normal limits  Except anemia which may be related to inflammation  Inflammatory markers are normal. Good.     CHINMAY neg 2016    Wt Readings from Last 4 Encounters:   05/11/18 71.1 kg (156 lb 12.8 oz)   03/19/18 67.1 kg (148 lb)   02/02/18 65.4 kg (144 lb 1.6 oz)   10/10/17 69.3 kg (152 lb 11.2 oz)     RA disease: moderate disease activity with persistent left wrist inflammation on methotrexate 20mg PO weekly. Dose was reduced in 2017 which may be triggered a flare of arthritis. Prednisone burst in 3/18 did not help much as left wrist synovitis is persistent. We will plan for local cortisone injection. Ordered to be done imaging guided. Patient agrees with the plan.     Continue folic acid 1mg PO daily.     The labs from patient records are reviewed.     I will be back in touch with the patient through mychart/letter when results are available.     Most Recent Immunizations   Administered Date(s) Administered     Influenza (H1N1) 12/29/2009     Influenza (IIV3) PF 10/04/2013     Influenza Vaccine IM 3yrs+ 4 Valent IIV4 10/10/2017     Pneumo Conj 13-V (2010&after) 10/10/2017     TD (ADULT, 7+) 06/01/2003     TDAP Vaccine (Adacel) 02/17/2009     Orders Placed This Encounter   Procedures     IR REFERRAL     Return in about 2 months (around 7/11/2018).    Medications Discontinued During This Encounter   Medication Reason     predniSONE (DELTASONE) 5 MG tablet      Current Outpatient Prescriptions   Medication Sig Dispense Refill     budesonide (PULMICORT) 0.5 MG/2ML neb  solution Spray 2 mLs (0.5 mg) in nostril daily Use with a sinus rinse 90 ampule 3     buPROPion (WELLBUTRIN XL) 150 MG 24 hr tablet TK 1 T PO QAM  3     cetirizine (ZYRTEC) 10 MG tablet Take 1 tablet by mouth every evening. 90 tablet 3     folic acid (FOLVITE) 1 MG tablet Take 1 tablet (1 mg) by mouth daily 100 tablet 3     levothyroxine (SYNTHROID/LEVOTHROID) 75 MCG tablet Take 1 tablet (75 mcg) by mouth daily 30 tablet 7     methotrexate 2.5 MG tablet CHEMO Take 8 tablets (20 mg) by mouth once a week 96 tablet 0       Clay Donohue MD  [facility]          Active Problem List:     Patient Active Problem List    Diagnosis Date Noted     Rheumatoid arthritis involving both hands with positive rheumatoid factor (H) 10/14/2016     Priority: Medium     CARDIOVASCULAR SCREENING; LDL GOAL LESS THAN 160 02/10/2010     Priority: Medium     Mild major depression (H) 05/20/2009     Priority: Medium     Hypothyroidism 11/06/2008     Priority: Medium     Female stress incontinence 06/07/2006     Priority: Medium     Allergic rhinitis 08/03/2005     Priority: Medium     Failed treatment with previous allergy shots  Problem list name updated by automated process. Provider to review              History of Present Illness:     Chief Complaint   Patient presents with     RECHECK       February 2, 2018    Onset: pt states that she is doing pretty well, only has pain when she does certain movements, has it in hands and finger. Dx 1-2 years ago with RA  Involved joints: hands and wrists  Pain scale:  1/10     Wakes the patient from sleep : No  Morning stiffness:No  Meds used:methotrexate, folic acid     Interim history  Since last visit:  1. Infections - Yes/ pt has been sick for over 2 weeks with cold and sinus infection  2. New symptoms/medical problem - No  3. Any side effects from Rheum medications -none  3. ER visits/Hospitalizations/surgeries - No  4. Last PCP visit: 10/10/17    Wt Readings from Last 4  Encounters:   05/11/18 71.1 kg (156 lb 12.8 oz)   03/19/18 67.1 kg (148 lb)   02/02/18 65.4 kg (144 lb 1.6 oz)   10/10/17 69.3 kg (152 lb 11.2 oz)       No h/o myalgia, rash, swollen glands  No family or personal history of psoriasis, ulcerative colitis or chron's disease.   Patient denies any raynauds    No h/o persistent shortness of breath, cough, chest pain  No h/o persistent nausea, vomiting, constipation, diarrhea, abdominal pain  No h/o hematochezia, hematuria, hemoptysis    March 19, 2018  Have you ever seen a rheumatologist YES Who Dr. Reyna /Dr Donohue  Joint pain history  Onset: 2016  Involved joints: fingers, wrists, knees are giving problems recently  Pain scale:  2/10     Wakes the patient from sleep : No  Morning stiffness:No  Meds used: methotrexate     Interim history  Since last visit:  1. Infections - No  2. New symptoms/medical problem - No  3. Any side effects from Rheum medications - none  3. ER visits/Hospitalizations/surgeries - No  4. Last PCP visit: 10/10/17    May 11, 2018  Have you ever seen a rheumatologist yes Who you When 03/19/2018                    Joint pain history, arthritis  Onset:   Involved joints: all finger joints and bilateral wrists  Pain scale:  2/10     Wakes the patient from sleep : No  Morning stiffness:stiff all the time per pt  Meds used:methotrexate dose increase has not helped as per patient; stress at work and home;      Interim history  Since last visit:  1. Infections - No  2. New symptoms/medical problem - Yes, symptoms getting worse, pt states under a lot of stress and thinks it makes it worse, Prednisone did not help  3. Any side effects from Rheum medications -no  3. ER visits/Hospitalizations/surgeries - No  4. Last PCP visit: 10/10/2017    BP Readings from Last 3 Encounters:   05/11/18 112/64   03/19/18 114/68   02/02/18 100/58          Review of Systems:   Complete ROS negative except for symptoms mentioned in the HPI          Past Medical History:      Past Medical History:   Diagnosis Date     Allergic rhinitis, cause unspecified     Hx of immunotherapy , failed     Family history of breast cancer in female     Sister BRCA pos but patient tested negative 2013     Gout 2012     Renal disease     incontinence     Thyroid disease     hypothyroid     Past Surgical History:   Procedure Laterality Date     COLONOSCOPY  12    Dr. Marcelo Briscoe at [facility]     COLONOSCOPY  2012    Procedure: COLONOSCOPY;  Colonoscopy  ;  Surgeon: Marcelo Briscoe MD, MD;  Location:  GI     EXCISE MASS FOOT Left 2016    Procedure: EXCISE MASS FOOT;  Surgeon: Marcelo Vaughn DPM;  Location: [facility] NASAL/SINUS SCOPE W THER INSTILL  2005     SLING TRANSPUBO WITHOUT ANTERIOR COLPORRHAPHY  2011    Procedure:SLING TRANSPUBO WITHOUT ANTERIOR COLPORRHAPHY; Pubovaginal Sling; Surgeon:KENNETH NEGRO; Location: OR            Social History:     Social History     Occupational History      [employer]     Social History Main Topics     Smoking status: Never Smoker     Smokeless tobacco: Never Used     Alcohol use 0.0 - 0.6 oz/week     0 - 1 Standard drinks or equivalent per week      Comment: occ.     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            Family History:     Family History   Problem Relation Age of Onset     Unknown/Adopted Father      Coronary Artery Disease Father      CEREBROVASCULAR DISEASE Maternal Grandmother      Neurologic Disorder Maternal Grandmother      stroke,  in her 50s     CEREBROVASCULAR DISEASE Paternal Grandfather      Neurologic Disorder Paternal Grandfather      stroke     CANCER Paternal Grandmother      Unsure what type of cancer            Allergies:     Allergies   Allergen Reactions     Sulfa Drugs      Hives and trouble breathing            Medications:     Current Outpatient Prescriptions   Medication Sig Dispense Refill      budesonide (PULMICORT) 0.5 MG/2ML neb solution Spray 2 mLs (0.5 mg) in nostril daily Use with a sinus rinse 90 ampule 3     buPROPion (WELLBUTRIN XL) 150 MG 24 hr tablet TK 1 T PO QAM  3     cetirizine (ZYRTEC) 10 MG tablet Take 1 tablet by mouth every evening. 90 tablet 3     folic acid (FOLVITE) 1 MG tablet Take 1 tablet (1 mg) by mouth daily 100 tablet 3     levothyroxine (SYNTHROID/LEVOTHROID) 75 MCG tablet Take 1 tablet (75 mcg) by mouth daily 30 tablet 7     methotrexate 2.5 MG tablet CHEMO Take 8 tablets (20 mg) by mouth once a week 96 tablet 0            Physical Exam:   Blood pressure 112/64, pulse 68, temperature 98.3  F (36.8  C), temperature source Oral, weight 71.1 kg (156 lb 12.8 oz), last menstrual period 10/13/2014, SpO2 97 %.  Wt Readings from Last 4 Encounters:   05/11/18 71.1 kg (156 lb 12.8 oz)   03/19/18 67.1 kg (148 lb)   02/02/18 65.4 kg (144 lb 1.6 oz)   10/10/17 69.3 kg (152 lb 11.2 oz)     Constitutional: well-developed, appearing stated age; cooperative  Eyes: normal conjunctiva, sclera  ENT: nl external ears, nose, lips.No mucous membrane lesions, normal saliva pool  Neck: no cervical lymphadenopathy  Resp: lungs clear to auscultation,   CV: RRR, no added sounds, no edema  GI: Abdomen soft and no tenderness  : not tested  Lymph: no cervical, supraclavicular or epitrochlear nodes  MS: Right wrist synovitis has resolved. Left wrist synovitis is still there with reduced ROM.   All shoulder, elbow, wrist, MCP/PIP/DIP, hip, knee, ankle, and foot MTP/IP joints were examined and  found normal. No active synovitis or deformity. Full ROM.  No dactylitis,  tenosynovitis, enthesopathy.  Skin: no rash in exposed areas  Psych: nl judgement, orientation, memory, affect.           Data:         Clay Donohue MD    [facility]

## 2018-05-11 NOTE — MR AVS SNAPSHOT
After Visit Summary   5/11/2018    Mae Connors    MRN: [id number]           Patient Information     Date Of Birth          1960        Visit Information        Provider Department      5/11/2018 4:30 PM Clay Donohue MD [facility]        Today's Diagnoses     Arthritis of left wrist    -  1    Rheumatic chorea with heart involvement          Care Instructions    Interventional radiology referral to [facility] radiology [place].  [facility]  Scheduling Line: [phone]  Scheduling Fax: [phone]  Clinic Phone: [phone]    Indication for consult: Left wrist injection (radiocarpal joint)          Follow-ups after your visit        Additional Services     IR REFERRAL       Interventional radiology referral to [facility] radiology [place].  [facility]  Scheduling Line: [phone]  Scheduling Fax: [phone]  Clinic Phone: [phone]    Patient not on blood thinners or aspirin.    Indication for consult: Left wrist injection (radiocarpal joint) with depomedrol 20mg mixed with 1% lidocaine.                  Follow-up notes from your care team     Return in about 2 months (around 7/11/2018).      Your next 10 appointments already scheduled     May 11, 2018  4:30 PM CDT   Return Visit with Clay Donohue MD   [facility] ([facility])    [address] [place] [id number] [phone]              Who to contact     If you have questions or need follow up information about today's clinic visit or your schedule please contact [facility] directly at [phone].  Normal or non-critical lab and imaging results will be communicated to you by MyChart, letter or phone within 4 business days after the clinic has received the results. If you do not hear from us within 7 days, please contact the clinic through MyChart or phone. If you have a critical or abnormal  lab result, we will notify you by phone as soon as possible.  Submit refill requests through [employer] or call your pharmacy and they will forward the refill request to us. Please allow 3 business days for your refill to be completed.          Additional Information About Your Visit        [facility] Information     [employer] gives you secure access to your electronic health record. If you see a primary care provider, you can also send messages to your care team and make appointments. If you have questions, please call your primary care clinic.  If you do not have a primary care provider, please call [phone] and they will assist you.        Care EveryWhere ID     This is your Care EveryWhere ID. This could be used by other organizations to access your [facility] medical records  [id number]        Your Vitals Were     Pulse Temperature Last Period Pulse Oximetry BMI (Body Mass Index)       68 98.3  F (36.8  C) (Oral) 10/13/2014 (Approximate) 97% 28.68 kg/m2        Blood Pressure from Last 3 Encounters:   05/11/18 112/64   03/19/18 114/68   02/02/18 100/58    Weight from Last 3 Encounters:   05/11/18 71.1 kg (156 lb 12.8 oz)   03/19/18 67.1 kg (148 lb)   02/02/18 65.4 kg (144 lb 1.6 oz)              We Performed the Following     IR REFERRAL          Today's Medication Changes          These changes are accurate as of 5/11/18  3:04 PM.  If you have any questions, ask your nurse or doctor.               Stop taking these medicines if you haven't already. Please contact your care team if you have questions.     predniSONE 5 MG tablet   Commonly known as:  DELTASONE   Stopped by:  Clay Donohue MD                    Primary Care Provider Office Phone # Fax #    Yasmine Richards [phone] [address] [place] [address]        Equal Access to Services     [facility] JERI : Lety Swartz, srinivasa bates, geovany shannon  la'mt thao. So [facility] [phone].    ATENCIÓN: Si habla real, tiene a grant disposición servicios gratuitos de asistencia lingüística. Dandy contreras [phone].    We comply with applicable federal civil rights laws and [place] laws. We do not discriminate on the basis of race, color, national origin, age, disability, sex, sexual orientation, or gender identity.            Thank you!     Thank you for choosing [facility] [place]  for your care. Our goal is always to provide you with excellent care. Hearing back from our patients is one way we can continue to improve our services. Please take a few minutes to complete the written survey that you may receive in the mail after your visit with us. Thank you!             Your Updated Medication List - Protect others around you: Learn how to safely use, store and throw away your medicines at www.disposemymeds.org.          This list is accurate as of 5/11/18  3:04 PM.  Always use your most recent med list.                   Brand Name Dispense Instructions for use Diagnosis    budesonide 0.5 MG/2ML neb solution    PULMICORT    90 ampule    Spray 2 mLs (0.5 mg) in nostril daily Use with a sinus rinse    Chronic non-seasonal allergic rhinitis, unspecified trigger       buPROPion 150 MG 24 hr tablet    WELLBUTRIN XL     TK 1 T PO QAM        cetirizine 10 MG tablet    zyrTEC    90 tablet    Take 1 tablet by mouth every evening.    Allergic rhinitis, cause unspecified       folic acid 1 MG tablet    FOLVITE    100 tablet    Take 1 tablet (1 mg) by mouth daily    Rheumatoid arthritis involving both hands with positive rheumatoid factor (H)       levothyroxine 75 MCG tablet    SYNTHROID/LEVOTHROID    30 tablet    Take 1 tablet (75 mcg) by mouth daily    Other specified hypothyroidism       methotrexate 2.5 MG tablet CHEMO     96 tablet    Take 8 tablets (20 mg) by mouth once a week    Rheumatoid arthritis involving both hands with positive rheumatoid factor (H)

## 2018-05-11 NOTE — PATIENT INSTRUCTIONS
Interventional radiology referral to [facility] radiology [place].  [facility]  Scheduling Line: [phone]  Scheduling Fax: [phone]  Clinic Phone: [phone]    Indication for consult: Left wrist injection (radiocarpal joint)

## 2018-05-17 ENCOUNTER — TRANSFERRED RECORDS (OUTPATIENT)
Dept: HEALTH INFORMATION MANAGEMENT | Facility: CLINIC | Age: 58
End: 2018-05-17

## 2018-07-12 NOTE — PROGRESS NOTES
[facility] - Rheumatology Clinic Visit     Mae Connors MRN# [id number]   YOB: 1960    Primary care provider: Yasmine Richards  Jul 13, 2018          Assessment and Plan:   # Rheumatoid arthritis (RF > 20 and ACPA positive >300) Dx 2016 by Dr. Reyna  # Right carpal tunnel syndrome  # Ehrlichiosis  # Transaminase elevation    We discussed recent lab results today:  Your liver enzymes have normalized. Good.   Basic blood cell counts, liver and kidney function labs within normal limits  Except anemia which may be related to inflammation  Inflammatory markers are normal. Good.     CHINMAY neg 2016    Wt Readings from Last 4 Encounters:   07/13/18 70.5 kg (155 lb 8 oz)   05/11/18 71.1 kg (156 lb 12.8 oz)   03/19/18 67.1 kg (148 lb)   02/02/18 65.4 kg (144 lb 1.6 oz)     RA disease: moderate disease activity on methotrexate 20mg PO weekly. S/p left wrist cortisone injection 6/18. Right wrist carpal tunnel syndrome present. Dose of methotrexate was reduced in 2017 which may be triggered a flare of arthritis.     Patient asked about possibility of increasing methotrexate to 25mg PO weekly. Labs in 1 month.   Patient undergoing stress at family and at work. She hopes this to improve soon.     Continue folic acid 1mg PO daily.     The labs from patient records are reviewed.     I will be back in touch with the patient through mychart/letter when results are available.     Most Recent Immunizations   Administered Date(s) Administered     Influenza (H1N1) 12/29/2009     Influenza (IIV3) PF 10/04/2013     Influenza Vaccine IM 3yrs+ 4 Valent IIV4 10/10/2017     Pneumo Conj 13-V (2010&after) 10/10/2017     TD (ADULT, 7+) 06/01/2003     TDAP Vaccine (Adacel) 02/17/2009     Orders Placed This Encounter   Procedures     CBC with platelets differential     AST     ALT     Creatinine     Return in about 3 months (around 10/13/2018).    Medications Discontinued During This Encounter   Medication Reason     methotrexate 2.5 MG  tablet CHEMO Reorder     methotrexate 2.5 MG tablet CHEMO Reorder     Current Outpatient Prescriptions   Medication Sig Dispense Refill     budesonide (PULMICORT) 0.5 MG/2ML neb solution Spray 2 mLs (0.5 mg) in nostril daily Use with a sinus rinse 90 ampule 3     buPROPion (WELLBUTRIN XL) 150 MG 24 hr tablet TK 1 T PO QAM  3     cetirizine (ZYRTEC) 10 MG tablet Take 1 tablet by mouth every evening. 90 tablet 3     folic acid (FOLVITE) 1 MG tablet Take 1 tablet (1 mg) by mouth daily 100 tablet 3     levothyroxine (SYNTHROID/LEVOTHROID) 75 MCG tablet Take 1 tablet (75 mcg) by mouth daily 30 tablet 7     methotrexate 2.5 MG tablet CHEMO Take 10 tablets (25 mg) by mouth once a week 120 tablet 0     [DISCONTINUED] methotrexate 2.5 MG tablet CHEMO Take 8 tablets (20 mg) by mouth once a week 96 tablet 0     [DISCONTINUED] methotrexate 2.5 MG tablet CHEMO Take 8 tablets (20 mg) by mouth once a week 96 tablet 0       Clay Donohue MD  [facility]          Active Problem List:     Patient Active Problem List    Diagnosis Date Noted     Rheumatoid arthritis involving both hands with positive rheumatoid factor (H) 10/14/2016     Priority: Medium     CARDIOVASCULAR SCREENING; LDL GOAL LESS THAN 160 02/10/2010     Priority: Medium     Mild major depression (H) 05/20/2009     Priority: Medium     Hypothyroidism 11/06/2008     Priority: Medium     Female stress incontinence 06/07/2006     Priority: Medium     Allergic rhinitis 08/03/2005     Priority: Medium     Failed treatment with previous allergy shots  Problem list name updated by automated process. Provider to review              History of Present Illness:     Chief Complaint   Patient presents with     RECHECK       February 2, 2018    Onset: pt states that she is doing pretty well, only has pain when she does certain movements, has it in hands and finger. Dx 1-2 years ago with RA  Involved joints: hands and wrists  Pain scale:  1/10     Wakes the  patient from sleep : No  Morning stiffness:No  Meds used:methotrexate, folic acid     Interim history  Since last visit:  1. Infections - Yes/ pt has been sick for over 2 weeks with cold and sinus infection  2. New symptoms/medical problem - No  3. Any side effects from Rheum medications -none  3. ER visits/Hospitalizations/surgeries - No  4. Last PCP visit: 10/10/17    Wt Readings from Last 4 Encounters:   07/13/18 70.5 kg (155 lb 8 oz)   05/11/18 71.1 kg (156 lb 12.8 oz)   03/19/18 67.1 kg (148 lb)   02/02/18 65.4 kg (144 lb 1.6 oz)       No h/o myalgia, rash, swollen glands  No family or personal history of psoriasis, ulcerative colitis or chron's disease.   Patient denies any raynauds    No h/o persistent shortness of breath, cough, chest pain  No h/o persistent nausea, vomiting, constipation, diarrhea, abdominal pain  No h/o hematochezia, hematuria, hemoptysis    March 19, 2018  Have you ever seen a rheumatologist YES Who Dr. Reyna /Dr Donohue  Joint pain history  Onset: 2016  Involved joints: fingers, wrists, knees are giving problems recently  Pain scale:  2/10     Wakes the patient from sleep : No  Morning stiffness:No  Meds used: methotrexate     Interim history  Since last visit:  1. Infections - No  2. New symptoms/medical problem - No  3. Any side effects from Rheum medications - none  3. ER visits/Hospitalizations/surgeries - No  4. Last PCP visit: 10/10/17    May 11, 2018  Have you ever seen a rheumatologist yes Who you When 03/19/2018                    Joint pain history, arthritis  Onset:   Involved joints: all finger joints and bilateral wrists  Pain scale:  2/10     Wakes the patient from sleep : No  Morning stiffness:stiff all the time per pt  Meds used:methotrexate dose increase has not helped as per patient; stress at work and home;      Interim history  Since last visit:  1. Infections - No  2. New symptoms/medical problem - Yes, symptoms getting worse, pt states under a lot of stress and  thinks it makes it worse, Prednisone did not help  3. Any side effects from Rheum medications -no  3. ER visits/Hospitalizations/surgeries - No  4. Last PCP visit: 10/10/2017    July 13, 2018  Have you ever seen a rheumatologist Yes Who You When 5/11/18  Joint pain history  Onset: pt is here for a follow-up. Pt states that her left wrist is better , right hand is worse. Yesterday. Does not happen every day. Patient reports that she is under a lot of stress lately  Involved joints: see above  Pain scale:  3/10     Wakes the patient from sleep : No  Morning stiffness:No  Meds used:methotrexate, folic acid     Interim history  Since last visit:  1. Infections - No  2. New symptoms/medical problem - No  3. Any side effects from Rheum medications -None  3. ER visits/Hospitalizations/surgeries - No  4. Last PCP visit: 10/10/17    BP Readings from Last 3 Encounters:   07/13/18 116/66   05/11/18 112/64   03/19/18 114/68          Review of Systems:   Complete ROS negative except for symptoms mentioned in the HPI          Past Medical History:     Past Medical History:   Diagnosis Date     Allergic rhinitis, cause unspecified 1990    Hx of immunotherapy , failed     Family history of breast cancer in female     Sister BRCA pos but patient tested negative 7/2013     Gout 12/21/2012     Renal disease     incontinence     Thyroid disease     hypothyroid     Past Surgical History:   Procedure Laterality Date     COLONOSCOPY  8-13-12    Dr. Marcelo Briscoe at [facility]     COLONOSCOPY  8/13/2012    Procedure: COLONOSCOPY;  Colonoscopy  ;  Surgeon: Marcelo Briscoe MD, MD;  Location:  GI     EXCISE MASS FOOT Left 7/12/2016    Procedure: EXCISE MASS FOOT;  Surgeon: Marcelo Vaughn DPM;  Location: [facility] NASAL/SINUS SCOPE W THER INSTILL  2005     SLING TRANSPUBO WITHOUT ANTERIOR COLPORRHAPHY  11/21/2011    Procedure:SLING TRANSPUBO WITHOUT ANTERIOR COLPORRHAPHY; Pubovaginal Sling; Surgeon:KENNETH NEGRO; Location: OR             Social History:     Social History     Occupational History      [employer]     Social History Main Topics     Smoking status: Never Smoker     Smokeless tobacco: Never Used     Alcohol use 0.0 - 0.6 oz/week     0 - 1 Standard drinks or equivalent per week      Comment: occ.     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            Family History:     Family History   Problem Relation Age of Onset     Unknown/Adopted Father      Coronary Artery Disease Father      Cerebrovascular Disease Maternal Grandmother      Neurologic Disorder Maternal Grandmother      stroke,  in her 50s     Cerebrovascular Disease Paternal Grandfather      Neurologic Disorder Paternal Grandfather      stroke     Cancer Paternal Grandmother      Unsure what type of cancer            Allergies:     Allergies   Allergen Reactions     Sulfa Drugs      Hives and trouble breathing            Medications:     Current Outpatient Prescriptions   Medication Sig Dispense Refill     budesonide (PULMICORT) 0.5 MG/2ML neb solution Spray 2 mLs (0.5 mg) in nostril daily Use with a sinus rinse 90 ampule 3     buPROPion (WELLBUTRIN XL) 150 MG 24 hr tablet TK 1 T PO QAM  3     cetirizine (ZYRTEC) 10 MG tablet Take 1 tablet by mouth every evening. 90 tablet 3     folic acid (FOLVITE) 1 MG tablet Take 1 tablet (1 mg) by mouth daily 100 tablet 3     levothyroxine (SYNTHROID/LEVOTHROID) 75 MCG tablet Take 1 tablet (75 mcg) by mouth daily 30 tablet 7     methotrexate 2.5 MG tablet CHEMO Take 10 tablets (25 mg) by mouth once a week 120 tablet 0     [DISCONTINUED] methotrexate 2.5 MG tablet CHEMO Take 8 tablets (20 mg) by mouth once a week 96 tablet 0     [DISCONTINUED] methotrexate 2.5 MG tablet CHEMO Take 8 tablets (20 mg) by mouth once a week 96 tablet 0            Physical Exam:   Blood pressure 116/66, pulse 72, temperature 98.1  F (36.7  C), temperature source Oral,  "height 1.575 m (5' 2\"), weight 70.5 kg (155 lb 8 oz), last menstrual period 10/13/2014, SpO2 99 %.  Wt Readings from Last 4 Encounters:   07/13/18 70.5 kg (155 lb 8 oz)   05/11/18 71.1 kg (156 lb 12.8 oz)   03/19/18 67.1 kg (148 lb)   02/02/18 65.4 kg (144 lb 1.6 oz)     Constitutional: well-developed, appearing stated age; cooperative  Right wrist Tinel's sign positive  MS: Right wrist synovitis has resolved. Left wrist synovitis is also significantly better but still has slight reduced ROM.   Skin: no rash in exposed areas  Psych: nl judgement, orientation, memory, affect.           Data:         Clay Donohue MD    [facility]    "

## 2018-07-13 ENCOUNTER — OFFICE VISIT (OUTPATIENT)
Dept: RHEUMATOLOGY | Facility: CLINIC | Age: 58
End: 2018-07-13
Payer: COMMERCIAL

## 2018-07-13 VITALS
HEIGHT: 62 IN | OXYGEN SATURATION: 99 % | SYSTOLIC BLOOD PRESSURE: 116 MMHG | DIASTOLIC BLOOD PRESSURE: 66 MMHG | BODY MASS INDEX: 28.61 KG/M2 | HEART RATE: 72 BPM | WEIGHT: 155.5 LBS | TEMPERATURE: 98.1 F

## 2018-07-13 DIAGNOSIS — M05.741 RHEUMATOID ARTHRITIS INVOLVING BOTH HANDS WITH POSITIVE RHEUMATOID FACTOR (H): ICD-10-CM

## 2018-07-13 DIAGNOSIS — M05.79 RHEUMATOID ARTHRITIS OF MULTIPLE SITES WITHOUT ORGAN OR SYSTEM INVOLVEMENT WITH POSITIVE RHEUMATOID FACTOR (H): Primary | ICD-10-CM

## 2018-07-13 DIAGNOSIS — M05.742 RHEUMATOID ARTHRITIS INVOLVING BOTH HANDS WITH POSITIVE RHEUMATOID FACTOR (H): ICD-10-CM

## 2018-07-13 PROCEDURE — 99213 OFFICE O/P EST LOW 20 MIN: CPT | Performed by: INTERNAL MEDICINE

## 2018-07-13 NOTE — NURSING NOTE
"Chief Complaint   Patient presents with     RECHECK       Initial /66 (BP Location: Right arm, Patient Position: Chair, Cuff Size: Adult Regular)  Pulse 72  Temp 98.1  F (36.7  C) (Oral)  Ht 1.575 m (5' 2\")  Wt 70.5 kg (155 lb 8 oz)  LMP 10/13/2014 (Approximate)  SpO2 99%  BMI 28.44 kg/m2 Estimated body mass index is 28.44 kg/(m^2) as calculated from the following:    Height as of this encounter: 1.575 m (5' 2\").    Weight as of this encounter: 70.5 kg (155 lb 8 oz).  Medication Reconciliation: complete    Have you ever seen a rheumatologist Yes Who You When 5/11/18  Joint pain history  Onset: pt is here for a follow-up. Pt states that her left wrist is better , right hand is worse.   Involved joints: see above  Pain scale:  3/10     Wakes the patient from sleep : No  Morning stiffness:No  Meds used:methotrexate, folic acid    Interim history  Since last visit:  1. Infections - No  2. New symptoms/medical problem - No  3. Any side effects from Rheum medications -None  3. ER visits/Hospitalizations/surgeries - No  4. Last PCP visit: 10/10/17  Wt Readings from Last 4 Encounters:   07/13/18 70.5 kg (155 lb 8 oz)   05/11/18 71.1 kg (156 lb 12.8 oz)   03/19/18 67.1 kg (148 lb)   02/02/18 65.4 kg (144 lb 1.6 oz)     BP Readings from Last 3 Encounters:   07/13/18 116/66   05/11/18 112/64   03/19/18 114/68       "

## 2018-07-13 NOTE — MR AVS SNAPSHOT
After Visit Summary   7/13/2018    Mae Connors    MRN: [id number]           Patient Information     Date Of Birth          1960        Visit Information        Provider Department      7/13/2018 3:00 PM Clay Donohue MD [facility]        Today's Diagnoses     Rheumatoid arthritis of multiple sites without organ or system involvement with positive rheumatoid factor (H)    -  1    Rheumatoid arthritis involving both hands with positive rheumatoid factor (H)           Follow-ups after your visit        Follow-up notes from your care team     Return in about 4 months (around 11/13/2018).      Future tests that were ordered for you today     Open Future Orders        Priority Expected Expires Ordered    CBC with platelets differential Routine 8/1/2018 9/30/2018 7/13/2018    AST Routine 8/1/2018 9/30/2018 7/13/2018    ALT Routine 8/1/2018 9/30/2018 7/13/2018    Creatinine Routine 8/1/2018 9/30/2018 7/13/2018            Who to contact     If you have questions or need follow up information about today's clinic visit or your schedule please contact [facility] directly at [phone].  Normal or non-critical lab and imaging results will be communicated to you by MyChart, letter or phone within 4 business days after the clinic has received the results. If you do not hear from us within 7 days, please contact the clinic through [employer] or phone. If you have a critical or abnormal lab result, we will notify you by phone as soon as possible.  Submit refill requests through [employer] or call your pharmacy and they will forward the refill request to us. Please allow 3 business days for your refill to be completed.          Additional Information About Your Visit        MyChart Information     [employer] gives you secure access to your electronic health record. If you see a primary care provider, you can also send messages to your care team and make appointments. If you have  "questions, please call your primary care clinic.  If you do not have a primary care provider, please call [phone] and they will assist you.        Care EveryWhere ID     This is your Care EveryWhere ID. This could be used by other organizations to access your [place] medical records  [id number]        Your Vitals Were     Pulse Temperature Height Last Period Pulse Oximetry BMI (Body Mass Index)    72 98.1  F (36.7  C) (Oral) 1.575 m (5' 2\") 10/13/2014 (Approximate) 99% 28.44 kg/m2       Blood Pressure from Last 3 Encounters:   07/13/18 116/66   05/11/18 112/64   03/19/18 114/68    Weight from Last 3 Encounters:   07/13/18 70.5 kg (155 lb 8 oz)   05/11/18 71.1 kg (156 lb 12.8 oz)   03/19/18 67.1 kg (148 lb)                 Today's Medication Changes          These changes are accurate as of 7/13/18  3:40 PM.  If you have any questions, ask your nurse or doctor.               Start taking these medicines.        Dose/Directions    methotrexate 2.5 MG tablet CHEMO   Used for:  Rheumatoid arthritis involving both hands with positive rheumatoid factor (H)   Started by:  Clay Donohue MD        Dose:  25 mg   Take 10 tablets (25 mg) by mouth once a week   Quantity:  120 tablet   Refills:  0            Where to get your medicines      These medications were sent to [id number] [address]  [address], [facility] [address]     Phone:  [phone]     methotrexate 2.5 MG tablet CHEMO                Primary Care Provider Office Phone # Fax #    Yasmine Richards [phone] [address] [place] [address]        Equal Access to Services     [facility] JERI AH: Lety Swartz, srinivasa bates, qasamyta [place], geovany thao. So [facility] [phone].    ATENCIÓN: Si habla español, tiene a grant disposición servicios gratuitos de asistencia " lingüística. Dandy al [phone].    We comply with applicable federal civil rights laws and [place] laws. We do not discriminate on the basis of race, color, national origin, age, disability, sex, sexual orientation, or gender identity.            Thank you!     Thank you for choosing [facility]  for your care. Our goal is always to provide you with excellent care. Hearing back from our patients is one way we can continue to improve our services. Please take a few minutes to complete the written survey that you may receive in the mail after your visit with us. Thank you!             Your Updated Medication List - Protect others around you: Learn how to safely use, store and throw away your medicines at www.disposemymeds.org.          This list is accurate as of 7/13/18  3:40 PM.  Always use your most recent med list.                   Brand Name Dispense Instructions for use Diagnosis    budesonide 0.5 MG/2ML neb solution    PULMICORT    90 ampule    Spray 2 mLs (0.5 mg) in nostril daily Use with a sinus rinse    Chronic non-seasonal allergic rhinitis, unspecified trigger       buPROPion 150 MG 24 hr tablet    WELLBUTRIN XL     TK 1 T PO QAM        cetirizine 10 MG tablet    zyrTEC    90 tablet    Take 1 tablet by mouth every evening.    Allergic rhinitis, cause unspecified       folic acid 1 MG tablet    FOLVITE    100 tablet    Take 1 tablet (1 mg) by mouth daily    Rheumatoid arthritis involving both hands with positive rheumatoid factor (H)       levothyroxine 75 MCG tablet    SYNTHROID/LEVOTHROID    30 tablet    Take 1 tablet (75 mcg) by mouth daily    Other specified hypothyroidism       methotrexate 2.5 MG tablet CHEMO     120 tablet    Take 10 tablets (25 mg) by mouth once a week    Rheumatoid arthritis involving both hands with positive rheumatoid factor (H)

## 2018-08-17 ENCOUNTER — TELEPHONE (OUTPATIENT)
Dept: RHEUMATOLOGY | Facility: CLINIC | Age: 58
End: 2018-08-17

## 2018-08-17 NOTE — TELEPHONE ENCOUNTER
please remind patient about pending labs  Received: Today       Clay Donohue MD  [facility]

## 2018-08-31 DIAGNOSIS — M05.741 RHEUMATOID ARTHRITIS INVOLVING BOTH HANDS WITH POSITIVE RHEUMATOID FACTOR (H): ICD-10-CM

## 2018-08-31 DIAGNOSIS — M05.742 RHEUMATOID ARTHRITIS INVOLVING BOTH HANDS WITH POSITIVE RHEUMATOID FACTOR (H): ICD-10-CM

## 2018-08-31 DIAGNOSIS — E03.8 OTHER SPECIFIED HYPOTHYROIDISM: ICD-10-CM

## 2018-08-31 DIAGNOSIS — M05.79 RHEUMATOID ARTHRITIS OF MULTIPLE SITES WITHOUT ORGAN OR SYSTEM INVOLVEMENT WITH POSITIVE RHEUMATOID FACTOR (H): ICD-10-CM

## 2018-08-31 LAB
ALT SERPL W P-5'-P-CCNC: 19 U/L (ref 0–50)
AST SERPL W P-5'-P-CCNC: 19 U/L (ref 0–45)
BASOPHILS # BLD AUTO: 0 10E9/L (ref 0–0.2)
BASOPHILS NFR BLD AUTO: 0.7 %
CREAT SERPL-MCNC: 0.79 MG/DL (ref 0.52–1.04)
DIFFERENTIAL METHOD BLD: ABNORMAL
EOSINOPHIL # BLD AUTO: 0.2 10E9/L (ref 0–0.7)
EOSINOPHIL NFR BLD AUTO: 4 %
ERYTHROCYTE [DISTWIDTH] IN BLOOD BY AUTOMATED COUNT: 13.8 % (ref 10–15)
GFR SERPL CREATININE-BSD FRML MDRD: 75 ML/MIN/1.7M2
HCT VFR BLD AUTO: 34.8 % (ref 35–47)
HGB BLD-MCNC: 11.4 G/DL (ref 11.7–15.7)
LYMPHOCYTES # BLD AUTO: 2.2 10E9/L (ref 0.8–5.3)
LYMPHOCYTES NFR BLD AUTO: 50.7 %
MCH RBC QN AUTO: 30 PG (ref 26.5–33)
MCHC RBC AUTO-ENTMCNC: 32.8 G/DL (ref 31.5–36.5)
MCV RBC AUTO: 92 FL (ref 78–100)
MONOCYTES # BLD AUTO: 0.3 10E9/L (ref 0–1.3)
MONOCYTES NFR BLD AUTO: 7.9 %
NEUTROPHILS # BLD AUTO: 1.6 10E9/L (ref 1.6–8.3)
NEUTROPHILS NFR BLD AUTO: 36.7 %
PLATELET # BLD AUTO: 281 10E9/L (ref 150–450)
RBC # BLD AUTO: 3.8 10E12/L (ref 3.8–5.2)
TSH SERPL DL<=0.005 MIU/L-ACNC: 0.66 MU/L (ref 0.4–4)
WBC # BLD AUTO: 4.3 10E9/L (ref 4–11)

## 2018-08-31 PROCEDURE — 82565 ASSAY OF CREATININE: CPT | Performed by: INTERNAL MEDICINE

## 2018-08-31 PROCEDURE — 36415 COLL VENOUS BLD VENIPUNCTURE: CPT | Performed by: INTERNAL MEDICINE

## 2018-08-31 PROCEDURE — 84450 TRANSFERASE (AST) (SGOT): CPT | Performed by: INTERNAL MEDICINE

## 2018-08-31 PROCEDURE — 84443 ASSAY THYROID STIM HORMONE: CPT | Performed by: INTERNAL MEDICINE

## 2018-08-31 PROCEDURE — 84460 ALANINE AMINO (ALT) (SGPT): CPT | Performed by: INTERNAL MEDICINE

## 2018-08-31 PROCEDURE — 85025 COMPLETE CBC W/AUTO DIFF WBC: CPT | Performed by: INTERNAL MEDICINE

## 2018-09-24 ENCOUNTER — E-VISIT (OUTPATIENT)
Dept: PEDIATRICS | Facility: CLINIC | Age: 58
End: 2018-09-24
Payer: COMMERCIAL

## 2018-09-24 DIAGNOSIS — R30.0 DYSURIA: ICD-10-CM

## 2018-09-24 DIAGNOSIS — N39.0 ACUTE UTI (URINARY TRACT INFECTION): Primary | ICD-10-CM

## 2018-09-24 PROCEDURE — 99444 ZZC PHYSICIAN ONLINE EVALUATION & MANAGEMENT SERVICE: CPT | Performed by: INTERNAL MEDICINE

## 2018-09-24 NOTE — MR AVS SNAPSHOT
After Visit Summary   9/24/2018    Mae Connors    MRN: [id number]           Patient Information     Date Of Birth          1960        Visit Information        Provider Department      9/24/2018 10:25 PM Yasmine Richards MD [facility]        Today's Diagnoses     Acute UTI (urinary tract infection)    -  1    Dysuria          Care Instructions        Urinary Tract Infections in Women    Urinary tract infections (UTIs) are most often caused by bacteria. These bacteria enter the urinary tract. The bacteria may come from outside the body. Or they may travel from the skin outside the rectum or vagina into the urethra. Female anatomy makes it easy for bacteria from the bowel to enter a woman s urinary tract, which is the most common source of UTI. This means women develop UTIs more often than men. Pain in or around the urinary tract is a common UTI symptom. But the only way to know for sure if you have a UTI for the healthcare provider to test your urine. The two tests that may be done are the urinalysis and urine culture.  Types of UTIs    Cystitis. A bladder infection (cystitis) is the most common UTI in women. You may have urgent or frequent urination. You may also have pain, burning when you urinate, and bloody urine.    Urethritis. This is an inflamed urethra, which is the tube that carries urine from the bladder to outside the body. You may have lower stomach or back pain. You may also have urgent or frequent urination.    Pyelonephritis. This is a kidney infection. If not treated, it can be serious and damage your kidneys. In severe cases, you may need to stay in the hospital. You may have a fever and lower back pain.  Medicines to treat a UTI  Most UTIs are treated with antibiotics. These kill the bacteria. The length of time you need to take them depends on the type of infection. It may be as short as 3 days. If you have repeated UTIs, you may need a low-dose antibiotic for  several months. Take antibiotics exactly as directed. Don t stop taking them until all of the medicine is gone. If you stop taking the antibiotic too soon, the infection may not go away. You may also develop a resistance to the antibiotic. This can make it much harder to treat.  Lifestyle changes to treat and prevent UTIs  The lifestyle changes below will help get rid of your UTI. They may also help prevent future UTIs.    Drink plenty of fluids. This includes water, juice, or other caffeine-free drinks. Fluids help flush bacteria out of your body.    Empty your bladder. Always empty your bladder when you feel the urge to urinate. And always urinate before going to sleep. Urine that stays in your bladder can lead to infection. Try to urinate before and after sex as well.    Practice good personal hygiene. Wipe yourself from front to back after using the toilet. This helps keep bacteria from getting into the urethra.    Use condoms during sex. These help prevent UTIs caused by sexually transmitted bacteria. Also don't use spermicides during sex. These can increase the risk for UTIs. Choose other forms of birth control instead. For women who tend to get UTIs after sex, a low-dose of a preventive antibiotic may be used. Be sure to discuss this option with your healthcare provider.    Follow up with your healthcare provider as directed. He or she may test to make sure the infection has cleared. If needed, more treatment may be started.  Date Last Reviewed: 1/1/2017 2000-2017 [employer]. [address] [place] [address]. All rights reserved. This information is not intended as a substitute for professional medical care. Always follow your healthcare professional's instructions.        Thank you for choosing us for your care. I have placed an order for a prescription so that you can start treatment. View your full visit summary for details by clicking on the link below. Your pharmacist will able  to address any questions you may have about the medication.     If you re not feeling better within 2-3 days, please schedule an appointment.  You can schedule an appointment right here in [employer], or call [phone]  If the visit is for the same symptoms as your e-visit, we ll refund the cost of your e-visit if seen within seven days.            Follow-ups after your visit        Your next 10 appointments already scheduled     Oct 12, 2018  3:30 PM CDT   Return Visit with [facility] Don[facility]   [facility]    [address] [place] [id number] [phone]              Who to contact     If you have questions or need follow up information about today's clinic visit or your schedule please contact [facility] directly at [phone].  Normal or non-critical lab and imaging results will be communicated to you by [employer], letter or phone within 4 business days after the clinic has received the results. If you do not hear from us within 7 days, please contact the clinic through tapvivat or phone. If you have a critical or abnormal lab result, we will notify you by phone as soon as possible.  Submit refill requests through RunAlong or call your pharmacy and they will forward the refill request to us. Please allow 3 business days for your refill to be completed.          Additional Information About Your Visit        [employer] Information     RunAlong gives you secure access to your electronic health record. If you see a primary care provider, you can also send messages to your care team and make appointments. If you have questions, please call your primary care clinic.  If you do not have a primary care provider, please call [phone] and they will assist you.        Care EveryWhere ID     This is your Care EveryWhere ID. This could be used by other organizations to access your [facility] medical records  [id number]        Your Vitals Were      Last Period                   10/13/2014 (Approximate)            Blood Pressure from Last 3 Encounters:   07/13/18 116/66   05/11/18 112/64   03/19/18 114/68    Weight from Last 3 Encounters:   07/13/18 155 lb 8 oz (70.5 kg)   05/11/18 156 lb 12.8 oz (71.1 kg)   03/19/18 148 lb (67.1 kg)                 Today's Medication Changes          These changes are accurate as of 9/24/18 11:59 PM.  If you have any questions, ask your nurse or doctor.               Start taking these medicines.        Dose/Directions    nitroFURantoin (macrocrystal-monohydrate) 100 MG capsule   Commonly known as:  MACROBID   Used for:  Acute UTI (urinary tract infection)        Dose:  100 mg   Take 1 capsule (100 mg) by mouth 2 times daily   Quantity:  14 capsule   Refills:  0            Where to get your medicines      These medications were sent to [facility] [id number] [place] [address] [id number]  [address], [facility] [address]     Phone:  [phone]     nitroFURantoin (macrocrystal-monohydrate) 100 MG capsule                Primary Care Provider Office Phone # Fax #    Yasmine Richards [phone] [address] [place] [address]        Equal Access to Services     [facility]: Hadii aad ku hadasho Soomaali, waaxda luqadaha, qaybta kaalmada adeegyada, geovany thao. So [facility] [phone].    ATENCIÓN: Si habla español, tiene a grant disposición servicios gratuitos de asistencia lingüística. Llame al [phone].    We comply with applicable federal civil rights laws and [place] laws. We do not discriminate on the basis of race, color, national origin, age, disability, sex, sexual orientation, or gender identity.            Thank you!     Thank you for choosing [facility]  for your care. Our goal is always to provide you with excellent care. Hearing back from our patients is one way we can continue to  improve our services. Please take a few minutes to complete the written survey that you may receive in the mail after your visit with us. Thank you!             Your Updated Medication List - Protect others around you: Learn how to safely use, store and throw away your medicines at www.disposemymeds.org.          This list is accurate as of 9/24/18 11:59 PM.  Always use your most recent med list.                   Brand Name Dispense Instructions for use Diagnosis    budesonide 0.5 MG/2ML neb solution    PULMICORT    90 ampule    Spray 2 mLs (0.5 mg) in nostril daily Use with a sinus rinse    Chronic non-seasonal allergic rhinitis, unspecified trigger       buPROPion 150 MG 24 hr tablet    WELLBUTRIN XL     TK 1 T PO QAM        cetirizine 10 MG tablet    zyrTEC    90 tablet    Take 1 tablet by mouth every evening.    Allergic rhinitis, cause unspecified       folic acid 1 MG tablet    FOLVITE    100 tablet    Take 1 tablet (1 mg) by mouth daily    Rheumatoid arthritis involving both hands with positive rheumatoid factor (H)       levothyroxine 75 MCG tablet    SYNTHROID/LEVOTHROID    30 tablet    Take 1 tablet (75 mcg) by mouth daily    Other specified hypothyroidism       methotrexate 2.5 MG tablet CHEMO     120 tablet    Take 10 tablets (25 mg) by mouth once a week    Rheumatoid arthritis involving both hands with positive rheumatoid factor (H)       nitroFURantoin (macrocrystal-monohydrate) 100 MG capsule    MACROBID    14 capsule    Take 1 capsule (100 mg) by mouth 2 times daily    Acute UTI (urinary tract infection)

## 2018-09-25 DIAGNOSIS — R82.90 NONSPECIFIC FINDING ON EXAMINATION OF URINE: Primary | ICD-10-CM

## 2018-09-25 DIAGNOSIS — R30.0 DYSURIA: ICD-10-CM

## 2018-09-25 LAB
ALBUMIN UR-MCNC: NEGATIVE MG/DL
APPEARANCE UR: ABNORMAL
BACTERIA #/AREA URNS HPF: ABNORMAL /HPF
BILIRUB UR QL STRIP: NEGATIVE
COLOR UR AUTO: YELLOW
GLUCOSE UR STRIP-MCNC: NEGATIVE MG/DL
HGB UR QL STRIP: NEGATIVE
KETONES UR STRIP-MCNC: NEGATIVE MG/DL
LEUKOCYTE ESTERASE UR QL STRIP: ABNORMAL
NITRATE UR QL: POSITIVE
NON-SQ EPI CELLS #/AREA URNS LPF: ABNORMAL /LPF
PH UR STRIP: 8.5 PH (ref 5–7)
RBC #/AREA URNS AUTO: ABNORMAL /HPF
SOURCE: ABNORMAL
SP GR UR STRIP: 1.01 (ref 1–1.03)
UROBILINOGEN UR STRIP-ACNC: 0.2 EU/DL (ref 0.2–1)
WBC #/AREA URNS AUTO: ABNORMAL /HPF

## 2018-09-25 PROCEDURE — 87086 URINE CULTURE/COLONY COUNT: CPT | Performed by: INTERNAL MEDICINE

## 2018-09-25 PROCEDURE — 81001 URINALYSIS AUTO W/SCOPE: CPT | Performed by: INTERNAL MEDICINE

## 2018-09-25 PROCEDURE — 87088 URINE BACTERIA CULTURE: CPT | Performed by: INTERNAL MEDICINE

## 2018-09-25 PROCEDURE — 87186 SC STD MICRODIL/AGAR DIL: CPT | Performed by: INTERNAL MEDICINE

## 2018-09-25 RX ORDER — NITROFURANTOIN 25; 75 MG/1; MG/1
100 CAPSULE ORAL 2 TIMES DAILY
Qty: 14 CAPSULE | Refills: 0 | Status: SHIPPED | OUTPATIENT
Start: 2018-09-25 | End: 2018-11-02

## 2018-09-26 NOTE — PATIENT INSTRUCTIONS
Urinary Tract Infections in Women    Urinary tract infections (UTIs) are most often caused by bacteria. These bacteria enter the urinary tract. The bacteria may come from outside the body. Or they may travel from the skin outside the rectum or vagina into the urethra. Female anatomy makes it easy for bacteria from the bowel to enter a woman s urinary tract, which is the most common source of UTI. This means women develop UTIs more often than men. Pain in or around the urinary tract is a common UTI symptom. But the only way to know for sure if you have a UTI for the healthcare provider to test your urine. The two tests that may be done are the urinalysis and urine culture.  Types of UTIs    Cystitis. A bladder infection (cystitis) is the most common UTI in women. You may have urgent or frequent urination. You may also have pain, burning when you urinate, and bloody urine.    Urethritis. This is an inflamed urethra, which is the tube that carries urine from the bladder to outside the body. You may have lower stomach or back pain. You may also have urgent or frequent urination.    Pyelonephritis. This is a kidney infection. If not treated, it can be serious and damage your kidneys. In severe cases, you may need to stay in the hospital. You may have a fever and lower back pain.  Medicines to treat a UTI  Most UTIs are treated with antibiotics. These kill the bacteria. The length of time you need to take them depends on the type of infection. It may be as short as 3 days. If you have repeated UTIs, you may need a low-dose antibiotic for several months. Take antibiotics exactly as directed. Don t stop taking them until all of the medicine is gone. If you stop taking the antibiotic too soon, the infection may not go away. You may also develop a resistance to the antibiotic. This can make it much harder to treat.  Lifestyle changes to treat and prevent UTIs  The lifestyle changes below will help get rid of your UTI.  They may also help prevent future UTIs.    Drink plenty of fluids. This includes water, juice, or other caffeine-free drinks. Fluids help flush bacteria out of your body.    Empty your bladder. Always empty your bladder when you feel the urge to urinate. And always urinate before going to sleep. Urine that stays in your bladder can lead to infection. Try to urinate before and after sex as well.    Practice good personal hygiene. Wipe yourself from front to back after using the toilet. This helps keep bacteria from getting into the urethra.    Use condoms during sex. These help prevent UTIs caused by sexually transmitted bacteria. Also don't use spermicides during sex. These can increase the risk for UTIs. Choose other forms of birth control instead. For women who tend to get UTIs after sex, a low-dose of a preventive antibiotic may be used. Be sure to discuss this option with your healthcare provider.    Follow up with your healthcare provider as directed. He or she may test to make sure the infection has cleared. If needed, more treatment may be started.  Date Last Reviewed: 1/1/2017 2000-2017 [employer]. [address] [place] [address]. All rights reserved. This information is not intended as a substitute for professional medical care. Always follow your healthcare professional's instructions.        Thank you for choosing us for your care. I have placed an order for a prescription so that you can start treatment. View your full visit summary for details by clicking on the link below. Your pharmacist will able to address any questions you may have about the medication.     If you re not feeling better within 2-3 days, please schedule an appointment.  You can schedule an appointment right here in [email or web address], or call [phone]  If the visit is for the same symptoms as your e-visit, we ll refund the cost of your e-visit if seen within seven days.

## 2018-09-27 LAB
BACTERIA SPEC CULT: ABNORMAL
SPECIMEN SOURCE: ABNORMAL

## 2018-10-22 DIAGNOSIS — M05.742 RHEUMATOID ARTHRITIS INVOLVING BOTH HANDS WITH POSITIVE RHEUMATOID FACTOR (H): ICD-10-CM

## 2018-10-22 DIAGNOSIS — M05.741 RHEUMATOID ARTHRITIS INVOLVING BOTH HANDS WITH POSITIVE RHEUMATOID FACTOR (H): ICD-10-CM

## 2018-10-22 NOTE — TELEPHONE ENCOUNTER
Refill request for methotrexate    Medication last filled 7/13/18 Quantity 120 Refills 0    Last rheumatology office visit 7/13/18  Future rheumatology office visit 11/2/18    Lab Results   Component Value Date    WBC 4.3 08/31/2018    HGB 11.4 08/31/2018    MCV 92 08/31/2018    MCH 30.0 08/31/2018    MCHC 32.8 08/31/2018    RDW 13.8 08/31/2018     08/31/2018     Lab Results   Component Value Date    ALT 19 08/31/2018     Lab Results   Component Value Date    AST 19 08/31/2018     Lab Results   Component Value Date    CR 0.79 08/31/2018     No results found for: URIC

## 2018-10-29 NOTE — PROGRESS NOTES
[facility] - Rheumatology Clinic Visit     Mae Connors MRN# [id number]   YOB: 1960    Primary care provider: Yasmine Richards  Nov 2, 2018          Assessment and Plan:   # Rheumatoid arthritis (RF > 20 and ACPA positive >300) Dx 2016 by Dr. Reyna  # Right carpal tunnel syndrome  # Ehrlichiosis  # Transaminase elevation    Your liver enzymes have normalized. Good.   Basic blood cell counts, liver and kidney function labs within normal limits  Except anemia which may be related to inflammation  Inflammatory markers are normal. Good.     CHINMAY neg 2016    Wt Readings from Last 4 Encounters:   11/02/18 70.3 kg (155 lb)   07/13/18 70.5 kg (155 lb 8 oz)   05/11/18 71.1 kg (156 lb 12.8 oz)   03/19/18 67.1 kg (148 lb)     RA disease: moderate disease activity on methotrexate 25 mg PO weekly (dose increased 7/18). S/p left wrist cortisone injection 6/18. Right wrist carpal tunnel syndrome present.   Dose of methotrexate was reduced in 2017 which triggered a flare of arthritis.     For the recent flare, I recommend trying prednisone 10mg PO daily X 1 week. If dramatic improvement, we may consider adding HCQ vs very low dose prednisone to have chronic better control of RA.     Continue folic acid 1mg PO daily.     OTC NSAIDs okay for pain PRN.     The labs from patient records are reviewed.     I will be back in touch with the patient through mychart/letter when results are available.     Most Recent Immunizations   Administered Date(s) Administered     Influenza (H1N1) 12/29/2009     Influenza (IIV3) PF 10/04/2013     Influenza Vaccine IM 3yrs+ 4 Valent IIV4 10/10/2017     Pneumo Conj 13-V (2010&after) 10/10/2017     TD (ADULT, 7+) 06/01/2003     TDAP Vaccine (Adacel) 02/17/2009     Orders Placed This Encounter   Procedures     CBC with platelets differential     AST     ALT     Creatinine     Return in about 2 months (around 1/2/2019).    Medications Discontinued During This Encounter   Medication Reason      nitroFURantoin, macrocrystal-monohydrate, (MACROBID) 100 MG capsule Therapy completed     Current Outpatient Prescriptions   Medication Sig Dispense Refill     cetirizine (ZYRTEC) 10 MG tablet Take 1 tablet by mouth every evening. 90 tablet 3     folic acid (FOLVITE) 1 MG tablet Take 1 tablet (1 mg) by mouth daily 100 tablet 3     levothyroxine (SYNTHROID/LEVOTHROID) 75 MCG tablet Take 1 tablet (75 mcg) by mouth daily 30 tablet 7     methotrexate 2.5 MG tablet CHEMO TAKE 10 TABLETS BY MOUTH ONCE WEEKLY 120 tablet 0     predniSONE (DELTASONE) 10 MG tablet Take 10 mg PO daily X 1 weeks and then stop 7 tablet 1     triamcinolone (NASACORT) 55 MCG/ACT inhaler Spray 2 sprays into both nostrils daily       budesonide (PULMICORT) 0.5 MG/2ML neb solution Spray 2 mLs (0.5 mg) in nostril daily Use with a sinus rinse (Patient not taking: Reported on 11/2/2018) 90 ampule 3     buPROPion (WELLBUTRIN XL) 150 MG 24 hr tablet TK 1 T PO QAM  3       Clay Donohue MD  [facility]          Active Problem List:     Patient Active Problem List    Diagnosis Date Noted     Rheumatoid arthritis involving both hands with positive rheumatoid factor (H) 10/14/2016     Priority: Medium     CARDIOVASCULAR SCREENING; LDL GOAL LESS THAN 160 02/10/2010     Priority: Medium     Mild major depression (H) 05/20/2009     Priority: Medium     Hypothyroidism 11/06/2008     Priority: Medium     Female stress incontinence 06/07/2006     Priority: Medium     Allergic rhinitis 08/03/2005     Priority: Medium     Failed treatment with previous allergy shots  Problem list name updated by automated process. Provider to review              History of Present Illness:     Chief Complaint   Patient presents with     RECHECK       February 2, 2018    Onset: pt states that she is doing pretty well, only has pain when she does certain movements, has it in hands and finger. Dx 1-2 years ago with RA  Involved joints: hands and wrists  Pain scale:   1/10     Wakes the patient from sleep : No  Morning stiffness:No  Meds used:methotrexate, folic acid     Interim history  Since last visit:  1. Infections - Yes/ pt has been sick for over 2 weeks with cold and sinus infection  2. New symptoms/medical problem - No  3. Any side effects from Rheum medications -none  3. ER visits/Hospitalizations/surgeries - No  4. Last PCP visit: 10/10/17    Wt Readings from Last 4 Encounters:   11/02/18 70.3 kg (155 lb)   07/13/18 70.5 kg (155 lb 8 oz)   05/11/18 71.1 kg (156 lb 12.8 oz)   03/19/18 67.1 kg (148 lb)       No h/o myalgia, rash, swollen glands  No family or personal history of psoriasis, ulcerative colitis or chron's disease.   Patient denies any raynauds    No h/o persistent shortness of breath, cough, chest pain  No h/o persistent nausea, vomiting, constipation, diarrhea, abdominal pain  No h/o hematochezia, hematuria, hemoptysis    March 19, 2018  Have you ever seen a rheumatologist YES Who Dr. Reyna /Dr Donohue  Joint pain history  Onset: 2016  Involved joints: fingers, wrists, knees are giving problems recently  Pain scale:  2/10     Wakes the patient from sleep : No  Morning stiffness:No  Meds used: methotrexate     Interim history  Since last visit:  1. Infections - No  2. New symptoms/medical problem - No  3. Any side effects from Rheum medications - none  3. ER visits/Hospitalizations/surgeries - No  4. Last PCP visit: 10/10/17    May 11, 2018  Have you ever seen a rheumatologist yes Who you When 03/19/2018                    Joint pain history, arthritis  Onset:   Involved joints: all finger joints and bilateral wrists  Pain scale:  2/10     Wakes the patient from sleep : No  Morning stiffness:stiff all the time per pt  Meds used:methotrexate dose increase has not helped as per patient; stress at work and home;      Interim history  Since last visit:  1. Infections - No  2. New symptoms/medical problem - Yes, symptoms getting worse, pt states under a lot  of stress and thinks it makes it worse, Prednisone did not help  3. Any side effects from Rheum medications -no  3. ER visits/Hospitalizations/surgeries - No  4. Last PCP visit: 10/10/2017    July 13, 2018  Have you ever seen a rheumatologist Yes Who You When 5/11/18  Joint pain history  Onset: pt is here for a follow-up. Pt states that her left wrist is better , right hand is worse. Yesterday. Does not happen every day. Patient reports that she is under a lot of stress lately  Involved joints: see above  Pain scale:  3/10     Wakes the patient from sleep : No  Morning stiffness:No  Meds used:methotrexate, folic acid     Interim history  Since last visit:  1. Infections - No  2. New symptoms/medical problem - No  3. Any side effects from Rheum medications -None  3. ER visits/Hospitalizations/surgeries - No  4. Last PCP visit: 10/10/17    November 2, 2018  Have you ever seen a rheumatologist yes Who you When 7/13/18  Joint pain history  Onset: patient is here for a follow up. Patient states she had a really bad flare up with hands and neck over the last weekend. Just started getting better Monday, still improving.   Involved joints: Hands and neck  Pain scale:  8/10  ; most of the pain in hands, wrists and upper back; 10 days ago was doing okay and sleeping well  Wakes the patient from sleep : Yes  Morning stiffness:Yes for 300 minutes  Meds used:methotrexate 25mg PO weekly, folic acid     Interim history  Since last visit:  1. Infections - Yes, had a bladder infection about a month ago and has now cleared  2. New symptoms/medical problem - Yes, neck is bothersome more now  3. Any side effects from Rheum medications -none  3. ER visits/Hospitalizations/surgeries - No  4. Last PCP visit: 1 year ago, is scheduled to see Dr. Richards in a couple weeks.    BP Readings from Last 3 Encounters:   11/02/18 114/74   07/13/18 116/66   05/11/18 112/64          Review of Systems:   Complete ROS negative except for symptoms  mentioned in the HPI          Past Medical History:     Past Medical History:   Diagnosis Date     Allergic rhinitis, cause unspecified     Hx of immunotherapy , failed     Family history of breast cancer in female     Sister BRCA pos but patient tested negative 2013     Gout 2012     Renal disease     incontinence     Thyroid disease     hypothyroid     Past Surgical History:   Procedure Laterality Date     COLONOSCOPY  12    Dr. Marcelo Briscoe at [facility]     COLONOSCOPY  2012    Procedure: COLONOSCOPY;  Colonoscopy  ;  Surgeon: Marcelo Briscoe MD, MD;  Location:  GI     EXCISE MASS FOOT Left 2016    Procedure: EXCISE MASS FOOT;  Surgeon: Marcelo Vaughn DPM;  Location: [facility]     HC NASAL/SINUS SCOPE W THER INSTILL  2005     SLING TRANSPUBO WITHOUT ANTERIOR COLPORRHAPHY  2011    Procedure:SLING TRANSPUBO WITHOUT ANTERIOR COLPORRHAPHY; Pubovaginal Sling; Surgeon:KENNETH NEGRO; Location: OR            Social History:     Social History     Occupational History      [employer]     Social History Main Topics     Smoking status: Never Smoker     Smokeless tobacco: Never Used     Alcohol use 0.0 - 0.6 oz/week     0 - 1 Standard drinks or equivalent per week      Comment: occ.     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            Family History:     Family History   Problem Relation Age of Onset     Unknown/Adopted Father      Coronary Artery Disease Father      Cerebrovascular Disease Maternal Grandmother      Neurologic Disorder Maternal Grandmother      stroke,  in her 50s     Cerebrovascular Disease Paternal Grandfather      Neurologic Disorder Paternal Grandfather      stroke     Cancer Paternal Grandmother      Unsure what type of cancer            Allergies:     Allergies   Allergen Reactions     Sulfa Drugs      Hives and trouble breathing            Medications:     Current  "Outpatient Prescriptions   Medication Sig Dispense Refill     cetirizine (ZYRTEC) 10 MG tablet Take 1 tablet by mouth every evening. 90 tablet 3     folic acid (FOLVITE) 1 MG tablet Take 1 tablet (1 mg) by mouth daily 100 tablet 3     levothyroxine (SYNTHROID/LEVOTHROID) 75 MCG tablet Take 1 tablet (75 mcg) by mouth daily 30 tablet 7     methotrexate 2.5 MG tablet CHEMO TAKE 10 TABLETS BY MOUTH ONCE WEEKLY 120 tablet 0     predniSONE (DELTASONE) 10 MG tablet Take 10 mg PO daily X 1 weeks and then stop 7 tablet 1     triamcinolone (NASACORT) 55 MCG/ACT inhaler Spray 2 sprays into both nostrils daily       budesonide (PULMICORT) 0.5 MG/2ML neb solution Spray 2 mLs (0.5 mg) in nostril daily Use with a sinus rinse (Patient not taking: Reported on 11/2/2018) 90 ampule 3     buPROPion (WELLBUTRIN XL) 150 MG 24 hr tablet TK 1 T PO QAM  3            Physical Exam:   Blood pressure 114/74, pulse 76, temperature 98.4  F (36.9  C), temperature source Oral, height 1.575 m (5' 2\"), weight 70.3 kg (155 lb), last menstrual period 10/13/2014, SpO2 98 %.  Wt Readings from Last 4 Encounters:   11/02/18 70.3 kg (155 lb)   07/13/18 70.5 kg (155 lb 8 oz)   05/11/18 71.1 kg (156 lb 12.8 oz)   03/19/18 67.1 kg (148 lb)     Constitutional: well-developed, appearing stated age; cooperative  MS: Bilateral wrist synovitis resolved. No synovitis in hands and upper extremity joints or lower extremity joints  Skin: no rash in exposed areas  Psych: nl judgement, orientation, memory, affect.           Data:         Clay Donohue MD    [facility]    "

## 2018-11-02 ENCOUNTER — OFFICE VISIT (OUTPATIENT)
Dept: RHEUMATOLOGY | Facility: CLINIC | Age: 58
End: 2018-11-02
Payer: COMMERCIAL

## 2018-11-02 VITALS
OXYGEN SATURATION: 98 % | DIASTOLIC BLOOD PRESSURE: 74 MMHG | BODY MASS INDEX: 28.52 KG/M2 | SYSTOLIC BLOOD PRESSURE: 114 MMHG | HEART RATE: 76 BPM | TEMPERATURE: 98.4 F | HEIGHT: 62 IN | WEIGHT: 155 LBS

## 2018-11-02 DIAGNOSIS — M05.79 RHEUMATOID ARTHRITIS, SEROPOSITIVE, MULTIPLE SITES (H): Primary | ICD-10-CM

## 2018-11-02 LAB
BASOPHILS # BLD AUTO: 0 10E9/L (ref 0–0.2)
BASOPHILS NFR BLD AUTO: 0.4 %
DIFFERENTIAL METHOD BLD: NORMAL
EOSINOPHIL # BLD AUTO: 0.1 10E9/L (ref 0–0.7)
EOSINOPHIL NFR BLD AUTO: 2.4 %
ERYTHROCYTE [DISTWIDTH] IN BLOOD BY AUTOMATED COUNT: 13.7 % (ref 10–15)
HCT VFR BLD AUTO: 36.3 % (ref 35–47)
HGB BLD-MCNC: 12 G/DL (ref 11.7–15.7)
LYMPHOCYTES # BLD AUTO: 2.2 10E9/L (ref 0.8–5.3)
LYMPHOCYTES NFR BLD AUTO: 43.2 %
MCH RBC QN AUTO: 29.8 PG (ref 26.5–33)
MCHC RBC AUTO-ENTMCNC: 33.1 G/DL (ref 31.5–36.5)
MCV RBC AUTO: 90 FL (ref 78–100)
MONOCYTES # BLD AUTO: 0.3 10E9/L (ref 0–1.3)
MONOCYTES NFR BLD AUTO: 5.9 %
NEUTROPHILS # BLD AUTO: 2.4 10E9/L (ref 1.6–8.3)
NEUTROPHILS NFR BLD AUTO: 48.1 %
PLATELET # BLD AUTO: 282 10E9/L (ref 150–450)
RBC # BLD AUTO: 4.03 10E12/L (ref 3.8–5.2)
WBC # BLD AUTO: 5.1 10E9/L (ref 4–11)

## 2018-11-02 PROCEDURE — 84460 ALANINE AMINO (ALT) (SGPT): CPT | Performed by: INTERNAL MEDICINE

## 2018-11-02 PROCEDURE — 82565 ASSAY OF CREATININE: CPT | Performed by: INTERNAL MEDICINE

## 2018-11-02 PROCEDURE — 85025 COMPLETE CBC W/AUTO DIFF WBC: CPT | Performed by: INTERNAL MEDICINE

## 2018-11-02 PROCEDURE — 36415 COLL VENOUS BLD VENIPUNCTURE: CPT | Performed by: INTERNAL MEDICINE

## 2018-11-02 PROCEDURE — 84450 TRANSFERASE (AST) (SGOT): CPT | Performed by: INTERNAL MEDICINE

## 2018-11-02 PROCEDURE — 99213 OFFICE O/P EST LOW 20 MIN: CPT | Performed by: INTERNAL MEDICINE

## 2018-11-02 RX ORDER — PREDNISONE 10 MG/1
TABLET ORAL
Qty: 7 TABLET | Refills: 1 | Status: SHIPPED | OUTPATIENT
Start: 2018-11-02 | End: 2018-11-15

## 2018-11-02 RX ORDER — TRIAMCINOLONE ACETONIDE 55 UG/1
2 SPRAY, METERED NASAL DAILY
COMMUNITY
End: 2019-01-11

## 2018-11-02 ASSESSMENT — ROUTINE ASSESSMENT OF PATIENT INDEX DATA (RAPID3)
TOTAL RAPID3 SCORE: 15.7
RAPID3 INTERPRETATION: HIGH > 12.0

## 2018-11-02 NOTE — NURSING NOTE
"Chief Complaint   Patient presents with     RECHECK       Initial /74 (BP Location: Right arm, Patient Position: Chair, Cuff Size: Adult Regular)  Pulse 76  Temp 98.4  F (36.9  C) (Oral)  Ht 1.575 m (5' 2\")  Wt 70.3 kg (155 lb)  LMP 10/13/2014 (Approximate)  SpO2 98%  BMI 28.35 kg/m2 Estimated body mass index is 28.35 kg/(m^2) as calculated from the following:    Height as of this encounter: 1.575 m (5' 2\").    Weight as of this encounter: 70.3 kg (155 lb).  Medication Reconciliation: complete    Have you ever seen a rheumatologist yes Who you When 7/13/18  Joint pain history  Onset: patient is here for a follow up. Patient states she had a really bad flare up with hands and neck over the last weekend. Just started getting better Monday, still improving.   Involved joints: Hands and neck  Pain scale:  8/10     Wakes the patient from sleep : Yes  Morning stiffness:Yes for 300 minutes  Meds used:methotrexate, folic acid    Interim history  Since last visit:  1. Infections - Yes, had a bladder infection about a month ago and has now cleared  2. New symptoms/medical problem - Yes, neck is bothersome more now  3. Any side effects from Rheum medications -none  3. ER visits/Hospitalizations/surgeries - No  4. Last PCP visit: 1 year ago, is scheduled to see Dr. Richards in a couple weeks.  Wt Readings from Last 4 Encounters:   11/02/18 70.3 kg (155 lb)   07/13/18 70.5 kg (155 lb 8 oz)   05/11/18 71.1 kg (156 lb 12.8 oz)   03/19/18 67.1 kg (148 lb)     BP Readings from Last 3 Encounters:   11/02/18 114/74   07/13/18 116/66   05/11/18 112/64       "

## 2018-11-02 NOTE — MR AVS SNAPSHOT
After Visit Summary   11/2/2018    Mae Connors    MRN: [id number]           Patient Information     Date Of Birth          1960        Visit Information        Provider Department      11/2/2018 2:00 PM [facility] Don[facility] MD [facility]        Today's Diagnoses     Rheumatoid arthritis, seropositive, multiple sites (H)    -  1       Follow-ups after your visit        Follow-up notes from your care team     Return in about 2 months (around 1/2/2019).      Your next 10 appointments already scheduled     Nov 16, 2018  2:50 PM CST   PHYSICAL with Yasmine Richards MD   [facility]    [address]  [address]  [facility] [address] [phone]              Who to contact     If you have questions or need follow up information about today's clinic visit or your schedule please contact [facility] directly at [phone].  Normal or non-critical lab and imaging results will be communicated to you by MyChart, letter or phone within 4 business days after the clinic has received the results. If you do not hear from us within 7 days, please contact the clinic through [employer] or phone. If you have a critical or abnormal lab result, we will notify you by phone as soon as possible.  Submit refill requests through [employer] or call your pharmacy and they will forward the refill request to us. Please allow 3 business days for your refill to be completed.          Additional Information About Your Visit        MyChart Information     [employer] gives you secure access to your electronic health record. If you see a primary care provider, you can also send messages to your care team and make appointments. If you have questions, please call your primary care clinic.  If you do not have a primary care provider, please call [phone] and they will assist you.        Care EveryWhere ID     This is your Care EveryWhere ID. This could be  "used by other organizations to access your [facility] medical records  [id number]        Your Vitals Were     Pulse Temperature Height Last Period Pulse Oximetry BMI (Body Mass Index)    76 98.4  F (36.9  C) (Oral) 1.575 m (5' 2\") 10/13/2014 (Approximate) 98% 28.35 kg/m2       Blood Pressure from Last 3 Encounters:   11/02/18 114/74   07/13/18 116/66   05/11/18 112/64    Weight from Last 3 Encounters:   11/02/18 70.3 kg (155 lb)   07/13/18 70.5 kg (155 lb 8 oz)   05/11/18 71.1 kg (156 lb 12.8 oz)              We Performed the Following     ALT     AST     CBC with platelets differential     Creatinine          Today's Medication Changes          These changes are accurate as of 11/2/18  2:31 PM.  If you have any questions, ask your nurse or doctor.               Start taking these medicines.        Dose/Directions    predniSONE 10 MG tablet   Commonly known as:  DELTASONE   Used for:  Rheumatoid arthritis, seropositive, multiple sites (H)   Started by:  Clay Donohue MD        Take 10 mg PO daily X 1 weeks and then stop   Quantity:  7 tablet   Refills:  1            Where to get your medicines      These medications were sent to [id number] [address]  [address] [place] , [facility] [address]     Phone:  [phone]     predniSONE 10 MG tablet                Primary Care Provider Office Phone # Fax #    Yasmine Richards [phone] [address] [place] [address]        Equal Access to Services     [facility]: Hadii nimisha taylor Soadriano, waaxda luqadaha, qaybta kaalmageovany snider. So [facility] [phone].    ATENCIÓN: Si habla español, tiene a grant disposición servicios gratuitos de asistencia lingüística. Llame al [phone].    We comply with applicable federal civil rights laws and [place] laws. We do not discriminate on the basis of race, " color, national origin, age, disability, sex, sexual orientation, or gender identity.            Thank you!     Thank you for choosing [facility]  for your care. Our goal is always to provide you with excellent care. Hearing back from our patients is one way we can continue to improve our services. Please take a few minutes to complete the written survey that you may receive in the mail after your visit with us. Thank you!             Your Updated Medication List - Protect others around you: Learn how to safely use, store and throw away your medicines at www.disposemymeds.org.          This list is accurate as of 11/2/18  2:31 PM.  Always use your most recent med list.                   Brand Name Dispense Instructions for use Diagnosis    budesonide 0.5 MG/2ML neb solution    PULMICORT    90 ampule    Spray 2 mLs (0.5 mg) in nostril daily Use with a sinus rinse    Chronic non-seasonal allergic rhinitis, unspecified trigger       buPROPion 150 MG 24 hr tablet    WELLBUTRIN XL     TK 1 T PO QAM        cetirizine 10 MG tablet    zyrTEC    90 tablet    Take 1 tablet by mouth every evening.    Allergic rhinitis, cause unspecified       folic acid 1 MG tablet    FOLVITE    100 tablet    Take 1 tablet (1 mg) by mouth daily    Rheumatoid arthritis involving both hands with positive rheumatoid factor (H)       levothyroxine 75 MCG tablet    SYNTHROID/LEVOTHROID    30 tablet    Take 1 tablet (75 mcg) by mouth daily    Other specified hypothyroidism       methotrexate 2.5 MG tablet CHEMO     120 tablet    TAKE 10 TABLETS BY MOUTH ONCE WEEKLY    Rheumatoid arthritis involving both hands with positive rheumatoid factor (H)       predniSONE 10 MG tablet    DELTASONE    7 tablet    Take 10 mg PO daily X 1 weeks and then stop    Rheumatoid arthritis, seropositive, multiple sites (H)       triamcinolone 55 MCG/ACT inhaler    NASACORT     Spray 2 sprays into both nostrils daily

## 2018-11-03 LAB
ALT SERPL W P-5'-P-CCNC: 29 U/L (ref 0–50)
AST SERPL W P-5'-P-CCNC: 29 U/L (ref 0–45)
CREAT SERPL-MCNC: 0.76 MG/DL (ref 0.52–1.04)
GFR SERPL CREATININE-BSD FRML MDRD: 78 ML/MIN/1.7M2

## 2018-11-15 DIAGNOSIS — M05.79 RHEUMATOID ARTHRITIS, SEROPOSITIVE, MULTIPLE SITES (H): ICD-10-CM

## 2018-11-15 RX ORDER — PREDNISONE 5 MG/1
TABLET ORAL
Qty: 90 TABLET | Refills: 0 | Status: SHIPPED | OUTPATIENT
Start: 2018-11-15 | End: 2019-03-08

## 2018-11-21 ENCOUNTER — E-VISIT (OUTPATIENT)
Dept: PEDIATRICS | Facility: CLINIC | Age: 58
End: 2018-11-21
Payer: COMMERCIAL

## 2018-11-21 DIAGNOSIS — J01.91 ACUTE RECURRENT SINUSITIS, UNSPECIFIED LOCATION: Primary | ICD-10-CM

## 2018-11-21 PROCEDURE — 99444 ZZC PHYSICIAN ONLINE EVALUATION & MANAGEMENT SERVICE: CPT | Performed by: INTERNAL MEDICINE

## 2018-11-21 RX ORDER — DOXYCYCLINE HYCLATE 100 MG
100 TABLET ORAL 2 TIMES DAILY
Qty: 20 TABLET | Refills: 0 | Status: SHIPPED | OUTPATIENT
Start: 2018-11-21 | End: 2018-12-03

## 2018-11-21 NOTE — PATIENT INSTRUCTIONS
Thank you for choosing us for your care. I have placed an order for a prescription so that you can start treatment. View your full visit summary for details by clicking on the link below. Your pharmacist will able to address any questions you may have about the medication.     If you're not feeling better within 5-7 days, please schedule an appointment.  You can schedule an appointment right here in [facility], or call [phone]  If the visit is for the same symptoms as your e-visit, we'll refund the cost of your e-visit if seen within seven days.      Sinusitis (Antibiotic Treatment)    The sinuses are air-filled spaces within the bones of the face. They connect to the inside of the nose. Sinusitis is an inflammation of the tissue that lines the sinuses. Sinusitis can occur during a cold. It can also happen due to allergies to pollens and other particles in the air. Sinusitis can cause symptoms of sinus congestion and a feeling of fullness. A sinus infection causes fever, headache, and facial pain. There is often green or yellow fluid draining from the nose or into the back of the throat (post-nasal drip). You have been given antibiotics to treat this condition.  Home care    Take the full course of antibiotics as instructed. Do not stop taking them, even when you feel better.    Drink plenty of water, hot tea, and other liquids. This may help thin nasal mucus. It also may help your sinuses drain fluids.    Heat may help soothe painful areas of your face. Use a towel soaked in hot water. Or,  the shower and direct the warm spray onto your face. Using a vaporizer along with a menthol rub at night may also help soothe symptoms.     An expectorant with guaifenesin may help thin nasal mucus and help your sinuses drain fluids.    You can use an over-the-counter decongestant, unless a similar medicine was prescribed to you. Nasal sprays work the fastest. Use one that contains phenylephrine or oxymetazoline.  First blow your nose gently. Then use the spray. Do not use these medicines more often than directed on the label. If you do, your symptoms may get worse. You may also take pills that contain pseudoephedrine. Don t use products that combine multiple medicines. This is because side effects may be increased. Read labels. You can also ask the pharmacist for help. (People with high blood pressure should not use decongestants. They can raise blood pressure.)    Over-the-counter antihistamines may help if allergies contributed to your sinusitis.      Do not use nasal rinses or irrigation during an acute sinus infection, unless your healthcare provider tells you to. Rinsing may spread the infection to other areas in your sinuses.    Use acetaminophen or ibuprofen to control pain, unless another pain medicine was prescribed to you. If you have chronic liver or kidney disease or ever had a stomach ulcer, talk with your healthcare provider before using these medicines. (Aspirin should never be taken by anyone under age 18 who is ill with a fever. It may cause severe liver damage.)    Don't smoke. This can make symptoms worse.  Follow-up care  Follow up with your healthcare provider or our staff if you are better in 1 week.  When to seek medical advice  Call your healthcare provider if any of these occur:    Facial pain or headache that gets worse    Stiff neck    Unusual drowsiness or confusion    Swelling of your forehead or eyelids    Vision problems, such as blurred or double vision    Fever of 100.4 F (38 C) or higher, or as directed by your healthcare provider    Seizure    Breathing problems    Symptoms don't go away in 10 days  Prevention  Here are steps you can take to help prevent an infection:    Keep good hand washing habits.    Don t have close contact with people who have sore throats, colds, or other upper respiratory infections.    Don t smoke, and stay away from secondhand smoke.    Stay up to date with of  your vaccines.  Date Last Reviewed: 11/1/2017 2000-2018 [employer]. [address] [place] [address], [place] [address]. All rights reserved. This information is not intended as a substitute for professional medical care. Always follow your healthcare professional's instructions.

## 2018-11-26 DIAGNOSIS — E03.8 OTHER SPECIFIED HYPOTHYROIDISM: ICD-10-CM

## 2018-11-26 RX ORDER — LEVOTHYROXINE SODIUM 75 UG/1
75 TABLET ORAL DAILY
Qty: 30 TABLET | Refills: 0 | Status: SHIPPED | OUTPATIENT
Start: 2018-11-26 | End: 2019-01-02

## 2018-11-26 NOTE — TELEPHONE ENCOUNTER
"Requested Prescriptions   Pending Prescriptions Disp Refills     levothyroxine (SYNTHROID/LEVOTHROID) 75 MCG tablet  Last Written Prescription Date:  02/01/2018  Last Fill Quantity: 30 tablet,  # refills: 7   Last office visit: 10/10/2017 with prescribing provider:      Yasmine Richards MD         Future Office Visit:   Next 5 appointments (look out 90 days)     Dec 08, 2018 10:00 AM CST   Screening Mammogram with CRMA1   [facility]    [address] [place] [id number] [phone]            Dec 14, 2018  2:30 PM CST   PHYSICAL with Yasmine Richards MD   [facility]    [address]  [address]  [facility] [id number] [phone]                  30 tablet 7     Sig: Take 1 tablet (75 mcg) by mouth daily    Thyroid Protocol Passed    11/26/2018  2:05 PM       Passed - Patient is 12 years or older       Passed - Recent (12 mo) or future (30 days) visit within the authorizing provider's specialty    Patient had office visit in the last 12 months or has a visit in the next 30 days with authorizing provider or within the authorizing provider's specialty.  See \"Patient Info\" tab in inbasket, or \"Choose Columns\" in Meds & Orders section of the refill encounter.             Passed - Normal TSH on file in past 12 months    Recent Labs   Lab Test  08/31/18   0853   TSH  0.66             Passed - No active pregnancy on record    If patient is pregnant or has had a positive pregnancy test, please check TSH.         Passed - No positive pregnancy test in past 12 months    If patient is pregnant or has had a positive pregnancy test, please check TSH.            "

## 2018-11-27 NOTE — TELEPHONE ENCOUNTER
30 day supply given.  Patient is due for yearly physical and lab work.  Please call and assist with scheduling appointment prior to next refill   Delmy Benson RN - Triage  [facility]

## 2018-11-30 ENCOUNTER — MYC MEDICAL ADVICE (OUTPATIENT)
Dept: PEDIATRICS | Facility: CLINIC | Age: 58
End: 2018-11-30

## 2018-11-30 NOTE — TELEPHONE ENCOUNTER
Advised recheck for ongoing sinus symptoms.     Rosa Menendez RN -- [facility]

## 2018-12-03 ENCOUNTER — OFFICE VISIT (OUTPATIENT)
Dept: URGENT CARE | Facility: URGENT CARE | Age: 58
End: 2018-12-03
Payer: COMMERCIAL

## 2018-12-03 VITALS
OXYGEN SATURATION: 97 % | SYSTOLIC BLOOD PRESSURE: 112 MMHG | DIASTOLIC BLOOD PRESSURE: 68 MMHG | TEMPERATURE: 97.8 F | HEART RATE: 64 BPM

## 2018-12-03 DIAGNOSIS — J01.90 ACUTE SINUSITIS WITH SYMPTOMS > 10 DAYS: Primary | ICD-10-CM

## 2018-12-03 DIAGNOSIS — M05.742 RHEUMATOID ARTHRITIS INVOLVING BOTH HANDS WITH POSITIVE RHEUMATOID FACTOR (H): ICD-10-CM

## 2018-12-03 DIAGNOSIS — M05.741 RHEUMATOID ARTHRITIS INVOLVING BOTH HANDS WITH POSITIVE RHEUMATOID FACTOR (H): ICD-10-CM

## 2018-12-03 PROCEDURE — 99214 OFFICE O/P EST MOD 30 MIN: CPT | Performed by: PHYSICIAN ASSISTANT

## 2018-12-03 NOTE — MR AVS SNAPSHOT
After Visit Summary   12/3/2018    Mae Connors    MRN: [id number]           Patient Information     Date Of Birth          1960        Visit Information        Provider Department      12/3/2018 11:40 AM Cathy Mccracekn PA-C [facility]        Today's Diagnoses     Acute sinusitis with symptoms > 10 days    -  1    Rheumatoid arthritis involving both hands with positive rheumatoid factor (H)           Follow-ups after your visit        Your next 10 appointments already scheduled     Dec 08, 2018 10:00 AM CST   Screening Mammogram with CRMA1   [facility]    [address] [place] [id number] [phone]           Do NOT use body powder, lotions, perfume or deodorant the day of the exam.  If your last mammogram was not done at [place], please bring your mammogram films. We will need the name of your provider to send a copy of your report.  A mammogram may be covered on an annual or biannual basis, please check with your insurance company.            Dec 21, 2018 12:50 PM CST   PHYSICAL with Yasmine Richards MD   [facility] ([facility]    [address]  [address]  [facility] [id number] [phone]              Who to contact     If you have questions or need follow up information about today's clinic visit or your schedule please contact [facility] directly at [phone].  Normal or non-critical lab and imaging results will be communicated to you by MyChart, letter or phone within 4 business days after the clinic has received the results. If you do not hear from us within 7 days, please contact the clinic through MyChart or phone. If you have a critical or abnormal lab result, we will notify you by phone as soon as possible.  Submit refill requests through [employer] or call your pharmacy and they will forward the refill request to us. Please  allow 3 business days for your refill to be completed.          Additional Information About Your Visit        MyChart Information     [employer] gives you secure access to your electronic health record. If you see a primary care provider, you can also send messages to your care team and make appointments. If you have questions, please call your primary care clinic.  If you do not have a primary care provider, please call [phone] and they will assist you.        Care EveryWhere ID     This is your Care EveryWhere ID. This could be used by other organizations to access your [place] medical records  [id number]        Your Vitals Were     Pulse Temperature Last Period Pulse Oximetry          64 97.8  F (36.6  C) (Tympanic) 10/13/2014 (Approximate) 97%         Blood Pressure from Last 3 Encounters:   12/03/18 112/68   11/02/18 114/74   07/13/18 116/66    Weight from Last 3 Encounters:   11/02/18 155 lb (70.3 kg)   07/13/18 155 lb 8 oz (70.5 kg)   05/11/18 156 lb 12.8 oz (71.1 kg)              Today, you had the following     No orders found for display         Today's Medication Changes          These changes are accurate as of 12/3/18  3:04 PM.  If you have any questions, ask your nurse or doctor.               Start taking these medicines.        Dose/Directions    amoxicillin-clavulanate 875-125 MG tablet   Commonly known as:  AUGMENTIN   Used for:  Acute sinusitis with symptoms > 10 days        Dose:  1 tablet   Take 1 tablet by mouth 2 times daily   Quantity:  20 tablet   Refills:  0            Where to get your medicines      These medications were sent to [id number] [address]  [address]  [address], [facility] [address]     Phone:  [phone]     amoxicillin-clavulanate 875-125 MG tablet                Primary Care Provider Office Phone # Fax #    Yasmine Richards [phone] [address] [facility]  [place] [address]        Equal Access to Services     [facility]: Hadii aad ku hadmarkrosaura Soparisali, waerrolda luqadaha, qaybta kaalmageovany snider. So [facility] [phone].    ATENCIÓN: Si habla español, tiene a grant disposición servicios gratuitos de asistencia lingüística. [facility] [phone].    We comply with applicable federal civil rights laws and [place] laws. We do not discriminate on the basis of race, color, national origin, age, disability, sex, sexual orientation, or gender identity.            Thank you!     Thank you for choosing [facility] [place] [facility]  for your care. Our goal is always to provide you with excellent care. Hearing back from our patients is one way we can continue to improve our services. Please take a few minutes to complete the written survey that you may receive in the mail after your visit with us. Thank you!             Your Updated Medication List - Protect others around you: Learn how to safely use, store and throw away your medicines at www.disposemymeds.org.          This list is accurate as of 12/3/18  3:04 PM.  Always use your most recent med list.                   Brand Name Dispense Instructions for use Diagnosis    amoxicillin-clavulanate 875-125 MG tablet    AUGMENTIN    20 tablet    Take 1 tablet by mouth 2 times daily    Acute sinusitis with symptoms > 10 days       buPROPion 150 MG 24 hr tablet    WELLBUTRIN XL     TK 1 T PO QAM        cetirizine 10 MG tablet    zyrTEC    90 tablet    Take 1 tablet by mouth every evening.    Allergic rhinitis, cause unspecified       folic acid 1 MG tablet    FOLVITE    100 tablet    Take 1 tablet (1 mg) by mouth daily    Rheumatoid arthritis involving both hands with positive rheumatoid factor (H)       levothyroxine 75 MCG tablet    SYNTHROID/LEVOTHROID    30 tablet    Take 1 tablet (75 mcg) by mouth daily    Other specified hypothyroidism       methotrexate 2.5 MG tablet     120 tablet     TAKE 10 TABLETS BY MOUTH ONCE WEEKLY    Rheumatoid arthritis involving both hands with positive rheumatoid factor (H)       predniSONE 5 MG tablet    DELTASONE    90 tablet    Take 7.5mg PO daily X 2 weeks and then 5mg PO daily    Rheumatoid arthritis, seropositive, multiple sites (H)       triamcinolone 55 MCG/ACT nasal aerosol    NASACORT     Spray 2 sprays into both nostrils daily

## 2018-12-03 NOTE — PROGRESS NOTES
SUBJECTIVE:  Mae Connors is a 58 year old female here with concerns about sinus infection.  She states onset of symptoms were 1 month(s) ago. States that did an E-visit and prescribed Doxy which completed the full course but not better.  Does have hx of sinus issues and allergies and had surgery years ago but still gets infections.  Does daily Neti pot rinses and Nasonex to help with sx.  She also has RA and on immunosuppressants of  Methotrexate. States that does have more difficulty clearing infections due to this at times   Current and Associated symptoms: nasal congestion, rhinorrhea, cough , headache, post-nasal drainage and full and heavy feeling in the head  Predisposing factors include allergies, sinus issues and immunosuppressed.  .   Denies fever, SOB or chest pain. No ST.  Taking in fluids well    Patient Active Problem List   Diagnosis     Allergic rhinitis     Female stress incontinence     Hypothyroidism     Mild major depression (H)     CARDIOVASCULAR SCREENING; LDL GOAL LESS THAN 160     Rheumatoid arthritis involving both hands with positive rheumatoid factor (H)         Past Medical History:   Diagnosis Date     Allergic rhinitis, cause unspecified 1990    Hx of immunotherapy , failed     Family history of breast cancer in female     Sister BRCA pos but patient tested negative 7/2013     Gout 12/21/2012     Renal disease     incontinence     Thyroid disease     hypothyroid     Social History   Substance Use Topics     Smoking status: Never Smoker     Smokeless tobacco: Never Used     Alcohol use 0.0 - 0.6 oz/week     0 - 1 Standard drinks or equivalent per week      Comment: occ.     Current Outpatient Prescriptions   Medication     amoxicillin-clavulanate (AUGMENTIN) 875-125 MG tablet     buPROPion (WELLBUTRIN XL) 150 MG 24 hr tablet     cetirizine (ZYRTEC) 10 MG tablet     folic acid (FOLVITE) 1 MG tablet     levothyroxine (SYNTHROID/LEVOTHROID) 75 MCG tablet     methotrexate 2.5 MG tablet  CHEMO     predniSONE (DELTASONE) 5 MG tablet     triamcinolone (NASACORT) 55 MCG/ACT inhaler     No current facility-administered medications for this visit.        ROS:  Review of systems otherwise negative except as stated above.    OBJECTIVE:  /68 (BP Location: Right arm, Patient Position: Chair, Cuff Size: Adult Regular)  Pulse 64  Temp 97.8  F (36.6  C) (Tympanic)  LMP 10/13/2014 (Approximate)  SpO2 97%  Exam:GENERAL APPEARANCE: healthy, alert and no distress  EYES: EOMI,  PERRL, conjunctiva clear  HENT: TM's normal bilaterally, nasal turbinates erythematous, swollen, rhinorrhea yellow, oral mucous membranes moist, no erythema noted and thick PND noted.  Sinus pressure   NECK: supple, nontender, no lymphadenopathy  RESP: lungs clear to auscultation - no rales, rhonchi or wheezes  CV: regular rates and rhythm, normal S1 S2, no murmur noted  NEURO: Normal strength and tone, sensory exam grossly normal,  normal speech and mentation  SKIN: no suspicious lesions or rashes  PSYCH: mentation appears normal and affect normal/bright    assessment/plan:  (J01.90) Acute sinusitis with symptoms > 10 days  (primary encounter diagnosis)  Comment:   Plan: amoxicillin-clavulanate (AUGMENTIN) 875-125 MG         tablet          Patient with continued sinus sx in setting of hx of sinus issues. Past sinus surgery and on immunosuppressant therapy  Making it harder to fight infection.  Will continue with OTC med for sx relief, Neit pot, hot packs and steam.  Augmentin as directed and if not cleared after second round consider ENT for further evaluation     (M05.741,  M05.742) Rheumatoid arthritis involving both hands with positive rheumatoid factor (H)  Comment:  Plan:  Stable and continue with Methotrexate and prescribed.

## 2018-12-08 DIAGNOSIS — Z12.31 VISIT FOR SCREENING MAMMOGRAM: ICD-10-CM

## 2018-12-08 PROCEDURE — 77067 SCR MAMMO BI INCL CAD: CPT | Mod: TC

## 2018-12-31 DIAGNOSIS — E03.8 OTHER SPECIFIED HYPOTHYROIDISM: ICD-10-CM

## 2018-12-31 NOTE — TELEPHONE ENCOUNTER
"Requested Prescriptions   Pending Prescriptions Disp Refills     levothyroxine (SYNTHROID/LEVOTHROID) 75 MCG tablet    Last Written Prescription Date:  11/26/2018  Last Fill Quantity: 30,  # refills: 0   Last office visit: 10/10/2017 with prescribing provider:  Yasmine Richards     Future Office Visit:   Next 5 appointments (look out 90 days)    Jan 11, 2019  1:10 PM CST  MyChart Physical Adult with Y[facility] [address] [place] [id number] [phone]          30 tablet 0     Sig: Take 1 tablet (75 mcg) by mouth daily    Thyroid Protocol Passed - 12/31/2018 12:46 PM       Passed - Patient is 12 years or older       Passed - Recent (12 mo) or future (30 days) visit within the authorizing provider's specialty    Patient had office visit in the last 12 months or has a visit in the next 30 days with authorizing provider or within the authorizing provider's specialty.  See \"Patient Info\" tab in inbasket, or \"Choose Columns\" in Meds & Orders section of the refill encounter.             Passed - Normal TSH on file in past 12 months    Recent Labs   Lab Test 08/31/18  0853   TSH 0.66             Passed - No active pregnancy on record    If patient is pregnant or has had a positive pregnancy test, please check TSH.         Passed - No positive pregnancy test in past 12 months    If patient is pregnant or has had a positive pregnancy test, please check TSH.              "

## 2019-01-02 RX ORDER — LEVOTHYROXINE SODIUM 75 UG/1
75 TABLET ORAL DAILY
Qty: 30 TABLET | Refills: 0 | Status: SHIPPED | OUTPATIENT
Start: 2019-01-02 | End: 2019-01-11

## 2019-01-02 NOTE — TELEPHONE ENCOUNTER
Routing refill request to provider for review/approval because:  Darlene given x1 and patient did not follow up, has future appointment scheduled  Patient needs to be seen because it has been more than 1 year since last office visit.  Delmy GIBBONS RN - Triage  [facility]

## 2019-01-11 ENCOUNTER — OFFICE VISIT (OUTPATIENT)
Dept: PEDIATRICS | Facility: CLINIC | Age: 59
End: 2019-01-11
Payer: COMMERCIAL

## 2019-01-11 VITALS
WEIGHT: 161.7 LBS | SYSTOLIC BLOOD PRESSURE: 126 MMHG | DIASTOLIC BLOOD PRESSURE: 76 MMHG | TEMPERATURE: 97.7 F | HEART RATE: 66 BPM | HEIGHT: 62 IN | BODY MASS INDEX: 29.76 KG/M2 | OXYGEN SATURATION: 98 %

## 2019-01-11 DIAGNOSIS — Z13.1 SCREENING FOR DIABETES MELLITUS: ICD-10-CM

## 2019-01-11 DIAGNOSIS — E03.8 OTHER SPECIFIED HYPOTHYROIDISM: ICD-10-CM

## 2019-01-11 DIAGNOSIS — E03.9 HYPOTHYROIDISM, UNSPECIFIED TYPE: ICD-10-CM

## 2019-01-11 DIAGNOSIS — Z00.00 ENCOUNTER FOR ROUTINE HISTORY AND PHYSICAL EXAMINATION OF ADULT: Primary | ICD-10-CM

## 2019-01-11 DIAGNOSIS — F32.0 MILD MAJOR DEPRESSION (H): ICD-10-CM

## 2019-01-11 DIAGNOSIS — Z13.220 SCREENING FOR LIPID DISORDERS: ICD-10-CM

## 2019-01-11 LAB — HBA1C MFR BLD: 5.5 % (ref 0–5.6)

## 2019-01-11 PROCEDURE — 80061 LIPID PANEL: CPT | Performed by: INTERNAL MEDICINE

## 2019-01-11 PROCEDURE — 84443 ASSAY THYROID STIM HORMONE: CPT | Performed by: INTERNAL MEDICINE

## 2019-01-11 PROCEDURE — 36415 COLL VENOUS BLD VENIPUNCTURE: CPT | Performed by: INTERNAL MEDICINE

## 2019-01-11 PROCEDURE — 83036 HEMOGLOBIN GLYCOSYLATED A1C: CPT | Performed by: INTERNAL MEDICINE

## 2019-01-11 PROCEDURE — 90471 IMMUNIZATION ADMIN: CPT | Performed by: INTERNAL MEDICINE

## 2019-01-11 PROCEDURE — 90750 HZV VACC RECOMBINANT IM: CPT | Performed by: INTERNAL MEDICINE

## 2019-01-11 PROCEDURE — 90715 TDAP VACCINE 7 YRS/> IM: CPT | Performed by: INTERNAL MEDICINE

## 2019-01-11 PROCEDURE — 90472 IMMUNIZATION ADMIN EACH ADD: CPT | Performed by: INTERNAL MEDICINE

## 2019-01-11 PROCEDURE — 99396 PREV VISIT EST AGE 40-64: CPT | Mod: 25 | Performed by: INTERNAL MEDICINE

## 2019-01-11 PROCEDURE — 90682 RIV4 VACC RECOMBINANT DNA IM: CPT | Performed by: INTERNAL MEDICINE

## 2019-01-11 RX ORDER — LEVOTHYROXINE SODIUM 75 UG/1
75 TABLET ORAL DAILY
Qty: 90 TABLET | Refills: 3 | Status: SHIPPED | OUTPATIENT
Start: 2019-01-11 | End: 2020-01-27

## 2019-01-11 RX ORDER — BUPROPION HYDROCHLORIDE 150 MG/1
TABLET ORAL
Qty: 90 TABLET | Refills: 3 | Status: SHIPPED | OUTPATIENT
Start: 2019-01-11 | End: 2020-03-18

## 2019-01-11 ASSESSMENT — ENCOUNTER SYMPTOMS: ARTHRALGIAS: 1

## 2019-01-11 ASSESSMENT — PATIENT HEALTH QUESTIONNAIRE - PHQ9
SUM OF ALL RESPONSES TO PHQ QUESTIONS 1-9: 6
10. IF YOU CHECKED OFF ANY PROBLEMS, HOW DIFFICULT HAVE THESE PROBLEMS MADE IT FOR YOU TO DO YOUR WORK, TAKE CARE OF THINGS AT HOME, OR GET ALONG WITH OTHER PEOPLE: SOMEWHAT DIFFICULT
SUM OF ALL RESPONSES TO PHQ QUESTIONS 1-9: 6

## 2019-01-11 ASSESSMENT — MIFFLIN-ST. JEOR: SCORE: 1279.97

## 2019-01-11 NOTE — PATIENT INSTRUCTIONS
Preventive Health Recommendations  Female Ages 50 - 64    Yearly exam: See your health care provider every year in order to  o Review health changes.   o Discuss preventive care.    o Review your medicines if your doctor has prescribed any.        If you get Pap tests with HPV test, you only need to test every 5 years, unless you have an abnormal result.  You are due next year.      Have a mammogram every 1 to 2 years.    Have a colonoscopy in 2022.    Have a cholesterol test every 5 years, or more often if advised.    Have a diabetes test (fasting glucose) every three years. If you are at risk for diabetes, you should have this test more often.     If you are at risk for osteoporosis (brittle bone disease), think about having a bone density scan (DEXA). Talk to Dr. Donohue about when you should need it.    Shots: Get a flu shot each year. Get a tetanus shot every 10 years.  Get a booster of the shingrix in 2-6 months.    Nutrition:     Eat at least 5 servings of fruits and vegetables each day.    Eat whole-grain bread, whole-wheat pasta and brown rice instead of white grains and rice.    Get adequate Calcium and Vitamin D.     Lifestyle    Exercise at least 150 minutes a week (30 minutes a day, 5 days a week). This will help you control your weight and prevent disease.    Limit alcohol to one drink per day.    No smoking.     Wear sunscreen to prevent skin cancer.     See your dentist every six months for an exam and cleaning.    See your eye doctor every 1 to 2 years.
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
## 2019-01-12 ASSESSMENT — PATIENT HEALTH QUESTIONNAIRE - PHQ9: SUM OF ALL RESPONSES TO PHQ QUESTIONS 1-9: 6

## 2019-01-13 LAB
CHOLEST SERPL-MCNC: 228 MG/DL
HDLC SERPL-MCNC: 120 MG/DL
LDLC SERPL CALC-MCNC: 98 MG/DL
NONHDLC SERPL-MCNC: 108 MG/DL
TRIGL SERPL-MCNC: 52 MG/DL
TSH SERPL DL<=0.005 MIU/L-ACNC: 0.91 MU/L (ref 0.4–4)

## 2019-01-19 DIAGNOSIS — M05.741 RHEUMATOID ARTHRITIS INVOLVING BOTH HANDS WITH POSITIVE RHEUMATOID FACTOR (H): ICD-10-CM

## 2019-01-19 DIAGNOSIS — M05.742 RHEUMATOID ARTHRITIS INVOLVING BOTH HANDS WITH POSITIVE RHEUMATOID FACTOR (H): ICD-10-CM

## 2019-01-21 NOTE — TELEPHONE ENCOUNTER
Methotrexate 2.5 MG Tablet  Last Written Prescription Date:  10/22/18  Last Fill Quantity: 120,  # refills: 0   Last office visit: 11/2/2018 with prescribing provider:  Dr. Donohue     Future Office Visit:  None Scheduled at this time.       Lab Results   Component Value Date    WBC 5.1 11/02/2018    HGB 12.0 11/02/2018    MCV 90 11/02/2018    MCH 29.8 11/02/2018    MCHC 33.1 11/02/2018    RDW 13.7 11/02/2018     11/02/2018     Lab Results   Component Value Date    ALT 29 11/02/2018     Lab Results   Component Value Date    AST 29 11/02/2018     Lab Results   Component Value Date    CR 0.76 11/02/2018     No results found for: URIC              Routed information to provider.  Andreina Navas

## 2019-03-08 ENCOUNTER — OFFICE VISIT (OUTPATIENT)
Dept: RHEUMATOLOGY | Facility: CLINIC | Age: 59
End: 2019-03-08
Payer: COMMERCIAL

## 2019-03-08 VITALS
TEMPERATURE: 98.2 F | DIASTOLIC BLOOD PRESSURE: 72 MMHG | BODY MASS INDEX: 30.18 KG/M2 | WEIGHT: 166.1 LBS | SYSTOLIC BLOOD PRESSURE: 118 MMHG | HEART RATE: 72 BPM

## 2019-03-08 DIAGNOSIS — M05.741 RHEUMATOID ARTHRITIS INVOLVING BOTH HANDS WITH POSITIVE RHEUMATOID FACTOR (H): ICD-10-CM

## 2019-03-08 DIAGNOSIS — M05.742 RHEUMATOID ARTHRITIS INVOLVING BOTH HANDS WITH POSITIVE RHEUMATOID FACTOR (H): ICD-10-CM

## 2019-03-08 DIAGNOSIS — M05.79 RHEUMATOID ARTHRITIS, SEROPOSITIVE, MULTIPLE SITES (H): ICD-10-CM

## 2019-03-08 LAB
ALT SERPL W P-5'-P-CCNC: 45 U/L (ref 0–50)
AST SERPL W P-5'-P-CCNC: 28 U/L (ref 0–45)
BASOPHILS # BLD AUTO: 0 10E9/L (ref 0–0.2)
BASOPHILS NFR BLD AUTO: 0.2 %
CREAT SERPL-MCNC: 0.95 MG/DL (ref 0.52–1.04)
DIFFERENTIAL METHOD BLD: NORMAL
EOSINOPHIL # BLD AUTO: 0.2 10E9/L (ref 0–0.7)
EOSINOPHIL NFR BLD AUTO: 3 %
ERYTHROCYTE [DISTWIDTH] IN BLOOD BY AUTOMATED COUNT: 14.9 % (ref 10–15)
GFR SERPL CREATININE-BSD FRML MDRD: 67 ML/MIN/{1.73_M2}
HCT VFR BLD AUTO: 37.2 % (ref 35–47)
HGB BLD-MCNC: 12.3 G/DL (ref 11.7–15.7)
LYMPHOCYTES # BLD AUTO: 2.6 10E9/L (ref 0.8–5.3)
LYMPHOCYTES NFR BLD AUTO: 32.2 %
MCH RBC QN AUTO: 29.4 PG (ref 26.5–33)
MCHC RBC AUTO-ENTMCNC: 33.1 G/DL (ref 31.5–36.5)
MCV RBC AUTO: 89 FL (ref 78–100)
MONOCYTES # BLD AUTO: 0.5 10E9/L (ref 0–1.3)
MONOCYTES NFR BLD AUTO: 6.3 %
NEUTROPHILS # BLD AUTO: 4.7 10E9/L (ref 1.6–8.3)
NEUTROPHILS NFR BLD AUTO: 58.3 %
PLATELET # BLD AUTO: 328 10E9/L (ref 150–450)
RBC # BLD AUTO: 4.18 10E12/L (ref 3.8–5.2)
WBC # BLD AUTO: 8.1 10E9/L (ref 4–11)

## 2019-03-08 PROCEDURE — 99213 OFFICE O/P EST LOW 20 MIN: CPT | Performed by: INTERNAL MEDICINE

## 2019-03-08 PROCEDURE — 36415 COLL VENOUS BLD VENIPUNCTURE: CPT | Performed by: INTERNAL MEDICINE

## 2019-03-08 PROCEDURE — 84450 TRANSFERASE (AST) (SGOT): CPT | Performed by: INTERNAL MEDICINE

## 2019-03-08 PROCEDURE — 84460 ALANINE AMINO (ALT) (SGPT): CPT | Performed by: INTERNAL MEDICINE

## 2019-03-08 PROCEDURE — 85025 COMPLETE CBC W/AUTO DIFF WBC: CPT | Performed by: INTERNAL MEDICINE

## 2019-03-08 PROCEDURE — 82565 ASSAY OF CREATININE: CPT | Performed by: INTERNAL MEDICINE

## 2019-03-08 RX ORDER — HYDROXYCHLOROQUINE SULFATE 200 MG/1
200 TABLET, FILM COATED ORAL 2 TIMES DAILY
Qty: 60 TABLET | Refills: 2 | Status: SHIPPED | OUTPATIENT
Start: 2019-03-08 | End: 2019-06-01

## 2019-03-08 RX ORDER — PREDNISONE 5 MG/1
TABLET ORAL
Qty: 45 TABLET | Refills: 0 | Status: SHIPPED | OUTPATIENT
Start: 2019-03-08 | End: 2020-05-15

## 2019-03-08 NOTE — PROGRESS NOTES
[place] - Rheumatology Clinic Visit     Mae Connors MRN# [id number]   YOB: 1960    Primary care provider: Yasmine Richards  Mar 8, 2019          Assessment and Plan:   # Rheumatoid arthritis (RF > 20 and ACPA positive >300) Dx 2016 by Dr. Reyna  # Right carpal tunnel syndrome  # Ehrlichiosis  # Transaminase elevation    Your liver enzymes have normalized. Good.   Basic blood cell counts, liver and kidney function labs within normal limits  Except anemia which may be related to inflammation  Inflammatory markers are normal. Good.     CHINMAY neg 2016    Wt Readings from Last 4 Encounters:   03/08/19 75.3 kg (166 lb 1.6 oz)   01/11/19 73.3 kg (161 lb 11.2 oz)   11/02/18 70.3 kg (155 lb)   07/13/18 70.5 kg (155 lb 8 oz)     RA disease: moderate disease activity on methotrexate 25 mg PO weekly (dose increased 7/18). S/p left wrist cortisone injection 6/18. Right wrist carpal tunnel syndrome present.   Dose of methotrexate was reduced in 2017 which triggered a flare of arthritis.     For the recent flare, I recommended trying prednisone which helped a lot. We discussed that adding plaquenil would be safer than chronic prednisone use.     Discussed with the patient regarding benefits versus risks of plaquenil therapy.  Discussed that plaquenil can cause in some people eye toxicity.   Needs baseline eye exam by ophthalmologist. Recommend periodic eye exams.   Discussed that it would take about 3-6 months to get maximum efficacy of plaquenil.    Continue folic acid 1mg PO daily.     OTC NSAIDs okay for pain PRN.     The labs from patient records are reviewed.     I will be back in touch with the patient through mychart/letter when results are available.     Most Recent Immunizations   Administered Date(s) Administered     Influenza (H1N1) 12/29/2009     Influenza (IIV3) PF 10/04/2013     Influenza Quad, Recombinant, p-free (RIV4) 01/11/2019     Influenza Vaccine IM 3yrs+ 4 Valent IIV4 10/10/2017     Pneumo  Conj 13-V (2010&after) 10/10/2017     TD (ADULT, 7+) 06/01/2003     TDAP Vaccine (Adacel) 01/11/2019     Zoster vaccine recombinant adjuvanted (SHINGRIX) 01/11/2019     Orders Placed This Encounter   Procedures     CBC with platelets differential     AST     ALT     Creatinine     Return in about 3 months (around 6/8/2019).    Medications Discontinued During This Encounter   Medication Reason     methotrexate 2.5 MG tablet Reorder     predniSONE (DELTASONE) 5 MG tablet      Current Outpatient Medications   Medication Sig Dispense Refill     B Complex-C (VITAMIN B COMPLEX W/VITAMIN C) TABS tablet Take 1 tablet by mouth daily       budesonide (RINOCORT AQUA) 32 MCG/ACT nasal spray Spray 1 spray into both nostrils daily       buPROPion (WELLBUTRIN XL) 150 MG 24 hr tablet TK 1 T PO QAM 90 tablet 3     cetirizine (ZYRTEC) 10 MG tablet Take 1 tablet by mouth every evening. 90 tablet 3     hydroxychloroquine (PLAQUENIL) 200 MG tablet Take 1 tablet (200 mg) by mouth 2 times daily Annual Plaquenil toxicity eye screening required. 60 tablet 2     levothyroxine (SYNTHROID/LEVOTHROID) 75 MCG tablet Take 1 tablet (75 mcg) by mouth daily 90 tablet 3     methotrexate 2.5 MG tablet TAKE 10 TABLETS BY MOUTH ONCE WEEKLY 120 tablet 0     predniSONE (DELTASONE) 5 MG tablet Take 5mg PO daily as needed for joint pains. Seek medical attention if not improving in 2 days.. 45 tablet 0       Clay Donohue MD  [facility]          Active Problem List:     Patient Active Problem List    Diagnosis Date Noted     Rheumatoid arthritis involving both hands with positive rheumatoid factor (H) 10/14/2016     Priority: Medium     CARDIOVASCULAR SCREENING; LDL GOAL LESS THAN 160 02/10/2010     Priority: Medium     Mild major depression (H) 05/20/2009     Priority: Medium     Hypothyroidism 11/06/2008     Priority: Medium     Female stress incontinence 06/07/2006     Priority: Medium     Allergic rhinitis 08/03/2005     Priority:  Medium     Failed treatment with previous allergy shots  Problem list name updated by automated process. Provider to review              History of Present Illness:     Chief Complaint   Patient presents with     RECHECK       February 2, 2018    Onset: pt states that she is doing pretty well, only has pain when she does certain movements, has it in hands and finger. Dx 1-2 years ago with RA  Involved joints: hands and wrists  Pain scale:  1/10     Wakes the patient from sleep : No  Morning stiffness:No  Meds used:methotrexate, folic acid     Interim history  Since last visit:  1. Infections - Yes/ pt has been sick for over 2 weeks with cold and sinus infection  2. New symptoms/medical problem - No  3. Any side effects from Rheum medications -none  3. ER visits/Hospitalizations/surgeries - No  4. Last PCP visit: 10/10/17    Wt Readings from Last 4 Encounters:   03/08/19 75.3 kg (166 lb 1.6 oz)   01/11/19 73.3 kg (161 lb 11.2 oz)   11/02/18 70.3 kg (155 lb)   07/13/18 70.5 kg (155 lb 8 oz)       No h/o myalgia, rash, swollen glands  No family or personal history of psoriasis, ulcerative colitis or chron's disease.   Patient denies any raynauds    No h/o persistent shortness of breath, cough, chest pain  No h/o persistent nausea, vomiting, constipation, diarrhea, abdominal pain  No h/o hematochezia, hematuria, hemoptysis    March 19, 2018  Have you ever seen a rheumatologist YES Who Dr. Reyna /Dr Donohue  Joint pain history  Onset: 2016  Involved joints: fingers, wrists, knees are giving problems recently  Pain scale:  2/10     Wakes the patient from sleep : No  Morning stiffness:No  Meds used: methotrexate     Interim history  Since last visit:  1. Infections - No  2. New symptoms/medical problem - No  3. Any side effects from Rheum medications - none  3. ER visits/Hospitalizations/surgeries - No  4. Last PCP visit: 10/10/17    May 11, 2018  Have you ever seen a rheumatologist yes Who you When 03/19/2018                     Joint pain history, arthritis  Onset:   Involved joints: all finger joints and bilateral wrists  Pain scale:  2/10     Wakes the patient from sleep : No  Morning stiffness:stiff all the time per pt  Meds used:methotrexate dose increase has not helped as per patient; stress at work and home;      Interim history  Since last visit:  1. Infections - No  2. New symptoms/medical problem - Yes, symptoms getting worse, pt states under a lot of stress and thinks it makes it worse, Prednisone did not help  3. Any side effects from Rheum medications -no  3. ER visits/Hospitalizations/surgeries - No  4. Last PCP visit: 10/10/2017    July 13, 2018  Have you ever seen a rheumatologist Yes Who You When 5/11/18  Joint pain history  Onset: pt is here for a follow-up. Pt states that her left wrist is better , right hand is worse. Yesterday. Does not happen every day. Patient reports that she is under a lot of stress lately  Involved joints: see above  Pain scale:  3/10     Wakes the patient from sleep : No  Morning stiffness:No  Meds used:methotrexate, folic acid     Interim history  Since last visit:  1. Infections - No  2. New symptoms/medical problem - No  3. Any side effects from Rheum medications -None  3. ER visits/Hospitalizations/surgeries - No  4. Last PCP visit: 10/10/17    November 2, 2018  Have you ever seen a rheumatologist yes Who you When 7/13/18  Joint pain history  Onset: patient is here for a follow up. Patient states she had a really bad flare up with hands and neck over the last weekend. Just started getting better Monday, still improving.   Involved joints: Hands and neck  Pain scale:  8/10  ; most of the pain in hands, wrists and upper back; 10 days ago was doing okay and sleeping well  Wakes the patient from sleep : Yes  Morning stiffness:Yes for 300 minutes  Meds used:methotrexate 25mg PO weekly, folic acid     Interim history  Since last visit:  1. Infections - Yes, had a bladder infection about a  month ago and has now cleared  2. New symptoms/medical problem - Yes, neck is bothersome more now  3. Any side effects from Rheum medications -none  3. ER visits/Hospitalizations/surgeries - No  4. Last PCP visit: 1 year ago, is scheduled to see Dr. Richards in a couple weeks.    March 8, 2019  Have you ever seen a rheumatologist yes Who you When 7/13/18  Joint pain history  Onset: patient is here for a follow up. Patient states she had a really bad flare up with hands and neck over the last weekend. Just started getting better Monday, still improving.   Involved joints: Hands and neck  Pain scale:3/10  Wakes the patient from sleep : Yes  Morning stiffness:No  Meds used:methotrexate, folic acid     Interim history  Since last visit:  1. Infections - No  2. New symptoms/medical problem - Yes, neck is better and bilateral wrist pain  3. Any side effects from Rheum medications -none  3. ER visits/Hospitalizations/surgeries - No  4. Last PCP visit: 01/2019.      BP Readings from Last 3 Encounters:   03/08/19 118/72   01/11/19 126/76   12/03/18 112/68          Review of Systems:   Complete ROS negative except for symptoms mentioned in the HPI          Past Medical History:     Past Medical History:   Diagnosis Date     Allergic rhinitis, cause unspecified 1990    Hx of immunotherapy , failed     Family history of breast cancer in female     Sister BRCA pos but patient tested negative 7/2013     Gout 12/21/2012     Renal disease     incontinence     Thyroid disease     hypothyroid     Past Surgical History:   Procedure Laterality Date     COLONOSCOPY  8-13-12    Dr. Marcelo Briscoe at [facility]     COLONOSCOPY  8/13/2012    Procedure: COLONOSCOPY;  Colonoscopy  ;  Surgeon: Marcelo Briscoe MD, MD;  Location:  GI     EXCISE MASS FOOT Left 7/12/2016    Procedure: EXCISE MASS FOOT;  Surgeon: Marcelo Vaughn DPM;  Location: [facility]     HC NASAL/SINUS SCOPE W THER INSTILL  2005     SLING TRANSPUBO WITHOUT ANTERIOR COLPORRHAPHY  11/21/2011     Procedure:SLING TRANSPUBO WITHOUT ANTERIOR COLPORRHAPHY; Pubovaginal Sling; Surgeon:KENNETH NEGRO; Location:[facility] OR            Social History:     Social History     Occupational History     Occupation:      Employer: [employer]   Tobacco Use     Smoking status: Never Smoker     Smokeless tobacco: Never Used   Substance and Sexual Activity     Alcohol use: Yes     Alcohol/week: 0.0 - 0.6 oz     Comment: occ.     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            Family History:     Family History   Problem Relation Age of Onset     Unknown/Adopted Father      Coronary Artery Disease Father      Cerebrovascular Disease Maternal Grandmother      Neurologic Disorder Maternal Grandmother         stroke,  in her 50s     Cerebrovascular Disease Paternal Grandfather      Neurologic Disorder Paternal Grandfather         stroke     Cancer Paternal Grandmother         Unsure what type of cancer            Allergies:     Allergies   Allergen Reactions     Sulfa Drugs      Hives and trouble breathing            Medications:     Current Outpatient Medications   Medication Sig Dispense Refill     B Complex-C (VITAMIN B COMPLEX W/VITAMIN C) TABS tablet Take 1 tablet by mouth daily       budesonide (RINOCORT AQUA) 32 MCG/ACT nasal spray Spray 1 spray into both nostrils daily       buPROPion (WELLBUTRIN XL) 150 MG 24 hr tablet TK 1 T PO QAM 90 tablet 3     cetirizine (ZYRTEC) 10 MG tablet Take 1 tablet by mouth every evening. 90 tablet 3     hydroxychloroquine (PLAQUENIL) 200 MG tablet Take 1 tablet (200 mg) by mouth 2 times daily Annual Plaquenil toxicity eye screening required. 60 tablet 2     levothyroxine (SYNTHROID/LEVOTHROID) 75 MCG tablet Take 1 tablet (75 mcg) by mouth daily 90 tablet 3     methotrexate 2.5 MG tablet TAKE 10 TABLETS BY MOUTH ONCE WEEKLY 120 tablet 0     predniSONE (DELTASONE) 5 MG tablet Take 5mg PO daily as  needed for joint pains. Seek medical attention if not improving in 2 days.. 45 tablet 0            Physical Exam:   Blood pressure 118/72, pulse 72, temperature 98.2  F (36.8  C), temperature source Oral, weight 75.3 kg (166 lb 1.6 oz), last menstrual period 10/13/2014.  Wt Readings from Last 4 Encounters:   03/08/19 75.3 kg (166 lb 1.6 oz)   01/11/19 73.3 kg (161 lb 11.2 oz)   11/02/18 70.3 kg (155 lb)   07/13/18 70.5 kg (155 lb 8 oz)     Constitutional: well-developed, appearing stated age; cooperative  MS: Bilateral wrist synovitis resolved. No synovitis in hands and upper extremity joints or lower extremity joints  Skin: no rash in exposed areas  Psych: nl judgement, orientation, memory, affect.           Data:         Clay Donohue MD    [facility]

## 2019-03-08 NOTE — NURSING NOTE
"No chief complaint on file.      Initial LMP 10/13/2014 (Approximate)  Estimated body mass index is 29.38 kg/m  as calculated from the following:    Height as of 1/11/19: 1.58 m (5' 2.21\").    Weight as of 1/11/19: 73.3 kg (161 lb 11.2 oz).  Medication Reconciliation: complete    Have you ever seen a rheumatologist yes Who you When 7/13/18  Joint pain history  Onset: patient is here for a follow up. Patient states she had a really bad flare up with hands and neck over the last weekend. Just started getting better Monday, still improving.   Involved joints: Hands and neck  Pain scale:3/10  Wakes the patient from sleep : Yes  Morning stiffness:No  Meds used:methotrexate, folic acid    Interim history  Since last visit:  1. Infections - No  2. New symptoms/medical problem - Yes, neck is better and bilateral wrist pain  3. Any side effects from Rheum medications -none  3. ER visits/Hospitalizations/surgeries - No  4. Last PCP visit: 01/2019.  Wt Readings from Last 4 Encounters:   01/11/19 73.3 kg (161 lb 11.2 oz)   11/02/18 70.3 kg (155 lb)   07/13/18 70.5 kg (155 lb 8 oz)   05/11/18 71.1 kg (156 lb 12.8 oz)     BP Readings from Last 3 Encounters:   01/11/19 126/76   12/03/18 112/68   11/02/18 114/74       "

## 2019-03-15 NOTE — RESULT ENCOUNTER NOTE
Results released to [facility]:  Dear Ms. Connors,  Basic blood cell counts, liver and kidney function labs within normal limits. Good!    If you have not done so already, please consider rating me online:  [employer]: [email or web address]  Vitals:  [email or web address]  [email or web address]  [email or web address]    Sincerely    Clay Donohue MD  [facility]

## 2019-06-03 DIAGNOSIS — M05.741 RHEUMATOID ARTHRITIS INVOLVING BOTH HANDS WITH POSITIVE RHEUMATOID FACTOR (H): ICD-10-CM

## 2019-06-03 DIAGNOSIS — M05.742 RHEUMATOID ARTHRITIS INVOLVING BOTH HANDS WITH POSITIVE RHEUMATOID FACTOR (H): ICD-10-CM

## 2019-06-03 NOTE — TELEPHONE ENCOUNTER
Requested Prescriptions   Pending Prescriptions Disp Refills     hydroxychloroquine (PLAQUENIL) 200 MG tablet        Last Written Prescription Date:  3/8/2019  Last Fill Quantity: 60,   # refills: 2  Last Office Visit: 1/11/2019  Future Office visit:    Next 5 appointments (look out 90 days)    Jun 14, 2019  2:30 PM CDT  Return Visit with Clay Donohue MD  [facility] ([facility]) [address] [place] [id number] [phone]           Routing refill request to provider for review/approval because:  Drug not on the [facility], [facility] or [facility] refill protocol or controlled substance 60 tablet 2     Sig: Take 1 tablet (200 mg) by mouth 2 times daily Annual Plaquenil toxicity eye screening required.       There is no refill protocol information for this order

## 2019-06-04 RX ORDER — HYDROXYCHLOROQUINE SULFATE 200 MG/1
200 TABLET, FILM COATED ORAL 2 TIMES DAILY
Qty: 60 TABLET | Refills: 2 | OUTPATIENT
Start: 2019-06-04

## 2019-06-12 NOTE — PROGRESS NOTES
[facility] - Rheumatology Clinic Visit     Mae Connors MRN# [id number]   YOB: 1960    Primary care provider: Yasmine Richards  Jun 14, 2019          Assessment and Plan:   # Rheumatoid arthritis (RF > 20 and ACPA positive >300) Dx 2016 by Dr. Reyna  # Right carpal tunnel syndrome  # Ehrlichiosis  # Transaminase elevation    Your liver enzymes have normalized. Good.   Basic blood cell counts, liver and kidney function labs within normal limits  Except anemia which may be related to inflammation  Inflammatory markers are normal. Good.     CHINMAY neg 2016    Wt Readings from Last 4 Encounters:   06/14/19 74.1 kg (163 lb 4.8 oz)   03/08/19 75.3 kg (166 lb 1.6 oz)   01/11/19 73.3 kg (161 lb 11.2 oz)   11/02/18 70.3 kg (155 lb)     RA disease: low disease activity on methotrexate 25 mg PO weekly (dose increased 7/18) and plaquenil 200mg BID. S/p left wrist cortisone injection 6/18.   Dose of methotrexate was reduced in 2017 which triggered a flare of arthritis.  CONTINUE METHOTREXATE AND PLAQUENIL THE SAME DOSE. FOLLOW UP WITH RHEUMATOLOGY IN 3-4 MONTHS. PLAQUENIL DOSE MAY BE SLIGHTLY REDUCED ACCORDING TO WEIGHT IN 3-4 MONTHS.     Eye exam - base line.      Continue folic acid 1mg PO daily.     OTC NSAIDs okay for pain PRN.     The labs from patient records are reviewed.     I will be back in touch with the patient through mychart/letter when results are available.     Most Recent Immunizations   Administered Date(s) Administered     Influenza (H1N1) 12/29/2009     Influenza (IIV3) PF 10/04/2013     Influenza Quad, Recombinant, p-free (RIV4) 01/11/2019     Influenza Vaccine IM 3yrs+ 4 Valent IIV4 10/10/2017     Pneumo Conj 13-V (2010&after) 10/10/2017     TD (ADULT, 7+) 06/01/2003     TDAP Vaccine (Adacel) 01/11/2019     Zoster vaccine recombinant adjuvanted (SHINGRIX) 01/11/2019     Orders Placed This Encounter   Procedures     CBC with platelets differential     AST     ALT     Creatinine     No follow-ups  on file.    Medications Discontinued During This Encounter   Medication Reason     hydroxychloroquine (PLAQUENIL) 200 MG tablet Reorder     methotrexate 2.5 MG tablet Reorder     Current Outpatient Medications   Medication Sig Dispense Refill     B Complex-C (VITAMIN B COMPLEX W/VITAMIN C) TABS tablet Take 1 tablet by mouth daily       budesonide (RINOCORT AQUA) 32 MCG/ACT nasal spray Spray 1 spray into both nostrils daily       buPROPion (WELLBUTRIN XL) 150 MG 24 hr tablet TK 1 T PO QAM 90 tablet 3     cetirizine (ZYRTEC) 10 MG tablet Take 1 tablet by mouth every evening. 90 tablet 3     hydroxychloroquine (PLAQUENIL) 200 MG tablet Take 1 tablet (200 mg) by mouth 2 times daily 120 tablet 2     levothyroxine (SYNTHROID/LEVOTHROID) 75 MCG tablet Take 1 tablet (75 mcg) by mouth daily 90 tablet 3     methotrexate 2.5 MG tablet TAKE 10 TABLETS BY MOUTH ONCE WEEKLY 80 tablet 1     predniSONE (DELTASONE) 5 MG tablet Take 5mg PO daily as needed for joint pains. Seek medical attention if not improving in 2 days.. (Patient not taking: Reported on 6/14/2019) 45 tablet 0       Clay Donohue MD  [facility]          Active Problem List:     Patient Active Problem List    Diagnosis Date Noted     Rheumatoid arthritis involving both hands with positive rheumatoid factor (H) 10/14/2016     Priority: Medium     CARDIOVASCULAR SCREENING; LDL GOAL LESS THAN 160 02/10/2010     Priority: Medium     Mild major depression (H) 05/20/2009     Priority: Medium     Hypothyroidism 11/06/2008     Priority: Medium     Female stress incontinence 06/07/2006     Priority: Medium     Allergic rhinitis 08/03/2005     Priority: Medium     Failed treatment with previous allergy shots  Problem list name updated by automated process. Provider to review              History of Present Illness:     Chief Complaint   Patient presents with     RECHECK       February 2, 2018    Onset: pt states that she is doing pretty well, only has  pain when she does certain movements, has it in hands and finger. Dx 1-2 years ago with RA  Involved joints: hands and wrists  Pain scale:  1/10     Wakes the patient from sleep : No  Morning stiffness:No  Meds used:methotrexate, folic acid     Interim history  Since last visit:  1. Infections - Yes/ pt has been sick for over 2 weeks with cold and sinus infection  2. New symptoms/medical problem - No  3. Any side effects from Rheum medications -none  3. ER visits/Hospitalizations/surgeries - No  4. Last PCP visit: 10/10/17    Wt Readings from Last 4 Encounters:   06/14/19 74.1 kg (163 lb 4.8 oz)   03/08/19 75.3 kg (166 lb 1.6 oz)   01/11/19 73.3 kg (161 lb 11.2 oz)   11/02/18 70.3 kg (155 lb)       No h/o myalgia, rash, swollen glands  No family or personal history of psoriasis, ulcerative colitis or chron's disease.   Patient denies any raynauds    No h/o persistent shortness of breath, cough, chest pain  No h/o persistent nausea, vomiting, constipation, diarrhea, abdominal pain  No h/o hematochezia, hematuria, hemoptysis    March 19, 2018  Have you ever seen a rheumatologist YES Who Dr. Reyna /Dr Donohue  Joint pain history  Onset: 2016  Involved joints: fingers, wrists, knees are giving problems recently  Pain scale:  2/10     Wakes the patient from sleep : No  Morning stiffness:No  Meds used: methotrexate     Interim history  Since last visit:  1. Infections - No  2. New symptoms/medical problem - No  3. Any side effects from Rheum medications - none  3. ER visits/Hospitalizations/surgeries - No  4. Last PCP visit: 10/10/17    May 11, 2018  Have you ever seen a rheumatologist yes Who you When 03/19/2018                    Joint pain history, arthritis  Onset:   Involved joints: all finger joints and bilateral wrists  Pain scale:  2/10     Wakes the patient from sleep : No  Morning stiffness:stiff all the time per pt  Meds used:methotrexate dose increase has not helped as per patient; stress at work and home;       Interim history  Since last visit:  1. Infections - No  2. New symptoms/medical problem - Yes, symptoms getting worse, pt states under a lot of stress and thinks it makes it worse, Prednisone did not help  3. Any side effects from Rheum medications -no  3. ER visits/Hospitalizations/surgeries - No  4. Last PCP visit: 10/10/2017    July 13, 2018  Have you ever seen a rheumatologist Yes Who You When 5/11/18  Joint pain history  Onset: pt is here for a follow-up. Pt states that her left wrist is better , right hand is worse. Yesterday. Does not happen every day. Patient reports that she is under a lot of stress lately  Involved joints: see above  Pain scale:  3/10     Wakes the patient from sleep : No  Morning stiffness:No  Meds used:methotrexate, folic acid     Interim history  Since last visit:  1. Infections - No  2. New symptoms/medical problem - No  3. Any side effects from Rheum medications -None  3. ER visits/Hospitalizations/surgeries - No  4. Last PCP visit: 10/10/17    November 2, 2018  Have you ever seen a rheumatologist yes Who you When 7/13/18  Joint pain history  Onset: patient is here for a follow up. Patient states she had a really bad flare up with hands and neck over the last weekend. Just started getting better Monday, still improving.   Involved joints: Hands and neck  Pain scale:  8/10  ; most of the pain in hands, wrists and upper back; 10 days ago was doing okay and sleeping well  Wakes the patient from sleep : Yes  Morning stiffness:Yes for 300 minutes  Meds used:methotrexate 25mg PO weekly, folic acid     Interim history  Since last visit:  1. Infections - Yes, had a bladder infection about a month ago and has now cleared  2. New symptoms/medical problem - Yes, neck is bothersome more now  3. Any side effects from Rheum medications -none  3. ER visits/Hospitalizations/surgeries - No  4. Last PCP visit: 1 year ago, is scheduled to see Dr. Richards in a couple weeks.    March 8, 2019  Have you  ever seen a rheumatologist yes Who you When 7/13/18  Joint pain history  Onset: patient is here for a follow up. Patient states she had a really bad flare up with hands and neck over the last weekend. Just started getting better Monday, still improving.   Involved joints: Hands and neck  Pain scale:3/10  Wakes the patient from sleep : Yes  Morning stiffness:No  Meds used:methotrexate, folic acid     Interim history  Since last visit:  1. Infections - No  2. New symptoms/medical problem - Yes, neck is better and bilateral wrist pain  3. Any side effects from Rheum medications -none  3. ER visits/Hospitalizations/surgeries - No  4. Last PCP visit: 01/2019.    June 14, 2019  Have you ever seen a rheumatologist yes Zhanna Donohue When 3/8/19  Joint pain history  Onset: pt report doing pretty good, flare ups no symptoms   Involved joints: wrist and fingers   Pain scale:  0/10   6/10 when aggravated    Wakes the patient from sleep : No  Morning stiffness:No  Meds used:methotrexate, plaquenil      Interim [id number]  Since last visit:  1. Infections - No  2. New symptoms/medical problem - No  3. Any side effects from Rheum medications -none   3. ER visits/Hospitalizations/surgeries - No  4. Last PCP visit: 1/11/19      BP Readings from Last 3 Encounters:   06/14/19 118/70   03/08/19 118/72   01/11/19 126/76          Review of Systems:   Complete ROS negative except for symptoms mentioned in the HPI          Past Medical History:     Past Medical History:   Diagnosis Date     Allergic rhinitis, cause unspecified 1990    Hx of immunotherapy , failed     Family history of breast cancer in female     Sister BRCA pos but patient tested negative 7/2013     Gout 12/21/2012     Renal disease     incontinence     Thyroid disease     hypothyroid     Past Surgical History:   Procedure Laterality Date     COLONOSCOPY  8-13-12    Dr. Marcelo Briscoe at [facility]     COLONOSCOPY  8/13/2012    Procedure: COLONOSCOPY;  Colonoscopy  ;  Surgeon:  Marcelo Briscoe MD, MD;  Location: [facility]     EXCISE MASS FOOT Left 2016    Procedure: EXCISE MASS FOOT;  Surgeon: Marcelo Vaughn DPM;  Location:  [facility]     HC NASAL/SINUS SCOPE W THER INSTILL       SLING TRANSPUBO WITHOUT ANTERIOR COLPORRHAPHY  2011    Procedure:SLING TRANSPUBO WITHOUT ANTERIOR COLPORRHAPHY; Pubovaginal Sling; Surgeon:KENNETH NEGRO; Location: OR            Social History:     Social History     Occupational History     Occupation:      Employer: [employer]   Tobacco Use     Smoking status: Never Smoker     Smokeless tobacco: Never Used   Substance and Sexual Activity     Alcohol use: Yes     Alcohol/week: 0.0 - 0.6 oz     Comment: occ.     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            Family History:     Family History   Problem Relation Age of Onset     Unknown/Adopted Father      Coronary Artery Disease Father      Cerebrovascular Disease Maternal Grandmother      Neurologic Disorder Maternal Grandmother         stroke,  in her 50s     Cerebrovascular Disease Paternal Grandfather      Neurologic Disorder Paternal Grandfather         stroke     Cancer Paternal Grandmother         Unsure what type of cancer            Allergies:     Allergies   Allergen Reactions     Sulfa Drugs      Hives and trouble breathing            Medications:     Current Outpatient Medications   Medication Sig Dispense Refill     B Complex-C (VITAMIN B COMPLEX W/VITAMIN C) TABS tablet Take 1 tablet by mouth daily       budesonide (RINOCORT AQUA) 32 MCG/ACT nasal spray Spray 1 spray into both nostrils daily       buPROPion (WELLBUTRIN XL) 150 MG 24 hr tablet TK 1 T PO QAM 90 tablet 3     cetirizine (ZYRTEC) 10 MG tablet Take 1 tablet by mouth every evening. 90 tablet 3     hydroxychloroquine (PLAQUENIL) 200 MG tablet Take 1 tablet (200 mg) by mouth 2 times daily 120 tablet 2     levothyroxine  (SYNTHROID/LEVOTHROID) 75 MCG tablet Take 1 tablet (75 mcg) by mouth daily 90 tablet 3     methotrexate 2.5 MG tablet TAKE 10 TABLETS BY MOUTH ONCE WEEKLY 80 tablet 1     predniSONE (DELTASONE) 5 MG tablet Take 5mg PO daily as needed for joint pains. Seek medical attention if not improving in 2 days.. (Patient not taking: Reported on 6/14/2019) 45 tablet 0            Physical Exam:   Blood pressure 118/70, pulse 80, temperature 98.6  F (37  C), temperature source Oral, weight 74.1 kg (163 lb 4.8 oz), last menstrual period 10/13/2014, SpO2 97 %.  Wt Readings from Last 4 Encounters:   06/14/19 74.1 kg (163 lb 4.8 oz)   03/08/19 75.3 kg (166 lb 1.6 oz)   01/11/19 73.3 kg (161 lb 11.2 oz)   11/02/18 70.3 kg (155 lb)     Constitutional: well-developed, appearing stated age; cooperative  MS: Bilateral wrist synovitis resolved. No synovitis in hands and upper extremity joints or lower extremity joints  Skin: no rash in exposed areas  Psych: nl judgement, orientation, memory, affect.           Data:         Clay Donohue MD    [facility]

## 2019-06-14 ENCOUNTER — OFFICE VISIT (OUTPATIENT)
Dept: RHEUMATOLOGY | Facility: CLINIC | Age: 59
End: 2019-06-14
Payer: COMMERCIAL

## 2019-06-14 ENCOUNTER — ALLIED HEALTH/NURSE VISIT (OUTPATIENT)
Dept: NURSING | Facility: CLINIC | Age: 59
End: 2019-06-14
Payer: COMMERCIAL

## 2019-06-14 VITALS
OXYGEN SATURATION: 97 % | HEART RATE: 80 BPM | SYSTOLIC BLOOD PRESSURE: 118 MMHG | DIASTOLIC BLOOD PRESSURE: 70 MMHG | WEIGHT: 163.3 LBS | BODY MASS INDEX: 29.67 KG/M2 | TEMPERATURE: 98.6 F

## 2019-06-14 DIAGNOSIS — M05.741 RHEUMATOID ARTHRITIS INVOLVING BOTH HANDS WITH POSITIVE RHEUMATOID FACTOR (H): ICD-10-CM

## 2019-06-14 DIAGNOSIS — Z53.9 ERRONEOUS ENCOUNTER--DISREGARD: Primary | ICD-10-CM

## 2019-06-14 DIAGNOSIS — M05.742 RHEUMATOID ARTHRITIS INVOLVING BOTH HANDS WITH POSITIVE RHEUMATOID FACTOR (H): ICD-10-CM

## 2019-06-14 DIAGNOSIS — Z23 ENCOUNTER FOR IMMUNIZATION: Primary | ICD-10-CM

## 2019-06-14 LAB
ALT SERPL W P-5'-P-CCNC: 28 U/L (ref 0–50)
AST SERPL W P-5'-P-CCNC: 26 U/L (ref 0–45)
BASOPHILS # BLD AUTO: 0 10E9/L (ref 0–0.2)
BASOPHILS NFR BLD AUTO: 0.6 %
CREAT SERPL-MCNC: 0.91 MG/DL (ref 0.52–1.04)
DIFFERENTIAL METHOD BLD: NORMAL
EOSINOPHIL # BLD AUTO: 0.1 10E9/L (ref 0–0.7)
EOSINOPHIL NFR BLD AUTO: 2.7 %
ERYTHROCYTE [DISTWIDTH] IN BLOOD BY AUTOMATED COUNT: 13.6 % (ref 10–15)
GFR SERPL CREATININE-BSD FRML MDRD: 70 ML/MIN/{1.73_M2}
HCT VFR BLD AUTO: 36.4 % (ref 35–47)
HGB BLD-MCNC: 12 G/DL (ref 11.7–15.7)
LYMPHOCYTES # BLD AUTO: 2.2 10E9/L (ref 0.8–5.3)
LYMPHOCYTES NFR BLD AUTO: 43.6 %
MCH RBC QN AUTO: 30.3 PG (ref 26.5–33)
MCHC RBC AUTO-ENTMCNC: 33 G/DL (ref 31.5–36.5)
MCV RBC AUTO: 92 FL (ref 78–100)
MONOCYTES # BLD AUTO: 0.5 10E9/L (ref 0–1.3)
MONOCYTES NFR BLD AUTO: 9.8 %
NEUTROPHILS # BLD AUTO: 2.2 10E9/L (ref 1.6–8.3)
NEUTROPHILS NFR BLD AUTO: 43.3 %
PLATELET # BLD AUTO: 277 10E9/L (ref 150–450)
RBC # BLD AUTO: 3.96 10E12/L (ref 3.8–5.2)
WBC # BLD AUTO: 5.1 10E9/L (ref 4–11)

## 2019-06-14 PROCEDURE — 82565 ASSAY OF CREATININE: CPT | Performed by: INTERNAL MEDICINE

## 2019-06-14 PROCEDURE — 85025 COMPLETE CBC W/AUTO DIFF WBC: CPT | Performed by: INTERNAL MEDICINE

## 2019-06-14 PROCEDURE — 84450 TRANSFERASE (AST) (SGOT): CPT | Performed by: INTERNAL MEDICINE

## 2019-06-14 PROCEDURE — 36415 COLL VENOUS BLD VENIPUNCTURE: CPT | Performed by: INTERNAL MEDICINE

## 2019-06-14 PROCEDURE — 84460 ALANINE AMINO (ALT) (SGPT): CPT | Performed by: INTERNAL MEDICINE

## 2019-06-14 PROCEDURE — 99213 OFFICE O/P EST LOW 20 MIN: CPT | Performed by: INTERNAL MEDICINE

## 2019-06-14 PROCEDURE — 90750 HZV VACC RECOMBINANT IM: CPT | Performed by: INTERNAL MEDICINE

## 2019-06-14 PROCEDURE — 90471 IMMUNIZATION ADMIN: CPT | Performed by: INTERNAL MEDICINE

## 2019-06-14 RX ORDER — HYDROXYCHLOROQUINE SULFATE 200 MG/1
200 TABLET, FILM COATED ORAL 2 TIMES DAILY
Qty: 120 TABLET | Refills: 2 | Status: SHIPPED | OUTPATIENT
Start: 2019-06-14 | End: 2021-07-20

## 2019-06-14 ASSESSMENT — ROUTINE ASSESSMENT OF PATIENT INDEX DATA (RAPID3): TOTAL RAPID3 SCORE: 2.5

## 2019-06-14 NOTE — NURSING NOTE
"Chief Complaint   Patient presents with     RECHECK       Initial LMP 10/13/2014 (Approximate)  Estimated body mass index is 30.18 kg/m  as calculated from the following:    Height as of 1/11/19: 1.58 m (5' 2.21\").    Weight as of 3/8/19: 75.3 kg (166 lb 1.6 oz).  Medication Reconciliation: complete    Have you ever seen a rheumatologist yes Zhanna Donohue When 3/8/19  Joint pain history  Onset: pt report doing pretty good, flare ups no symptoms   Involved joints: wrist and fingers   Pain scale:  0/10   6/10 when aggravated    Wakes the patient from sleep : No  Morning stiffness:No  Meds used:methotrexate, plaquenil     Interim [id number]  Since last visit:  1. Infections - No  2. New symptoms/medical problem - No  3. Any side effects from Rheum medications -none   3. ER visits/Hospitalizations/surgeries - No  4. Last PCP visit: 1/11/19  Wt Readings from Last 4 Encounters:   03/08/19 75.3 kg (166 lb 1.6 oz)   01/11/19 73.3 kg (161 lb 11.2 oz)   11/02/18 70.3 kg (155 lb)   07/13/18 70.5 kg (155 lb 8 oz)     BP Readings from Last 3 Encounters:   03/08/19 118/72   01/11/19 126/76   12/03/18 112/68       "

## 2019-06-14 NOTE — NURSING NOTE
Screening Questionnaire for Adult Immunization    Are you sick today?   No   Do you have allergies to medications, food, a vaccine component or latex?   No   Have you ever had a serious reaction after receiving a vaccination?   No   Do you have a long-term health problem with heart disease, lung disease, asthma, kidney disease, metabolic disease (e.g. diabetes), anemia, or other blood disorder?   No   Do you have cancer, leukemia, HIV/AIDS, or any other immune system problem?   No   In the past 3 months, have you taken medications that affect  your immune system, such as prednisone, other steroids, or anticancer drugs; drugs for the treatment of rheumatoid arthritis, Crohn s disease, or psoriasis; or have you had radiation treatments?   No   Have you had a seizure, or a brain or other nervous system problem?   No   During the past year, have you received a transfusion of blood or blood     products, or been given immune (gamma) globulin or antiviral drug?   No   For women: Are you pregnant or is there a chance you could become        pregnant during the next month?   No   Have you received any vaccinations in the past 4 weeks?   No     Immunization questionnaire answers were all negative.        Per orders of Dr. Richards (PCP), injection of Shingrix given by Stephen Armstrong. Patient instructed to remain in clinic for 15 minutes afterwards, and to report any adverse reaction to me immediately.    Had received first dose already. No reactions reported from initial vaccine.      Screening performed by Stephen Armstrong CMA on 6/14/2019 at 3:18 PM.

## 2019-11-04 ENCOUNTER — HEALTH MAINTENANCE LETTER (OUTPATIENT)
Age: 59
End: 2019-11-04

## 2019-11-08 ENCOUNTER — E-VISIT (OUTPATIENT)
Dept: PEDIATRICS | Facility: CLINIC | Age: 59
End: 2019-11-08
Payer: COMMERCIAL

## 2019-11-08 DIAGNOSIS — J01.90 ACUTE NON-RECURRENT SINUSITIS, UNSPECIFIED LOCATION: Primary | ICD-10-CM

## 2019-11-08 PROCEDURE — 99444 ZZC PHYSICIAN ONLINE EVALUATION & MANAGEMENT SERVICE: CPT | Performed by: INTERNAL MEDICINE

## 2019-11-08 RX ORDER — IPRATROPIUM BROMIDE 42 UG/1
2 SPRAY, METERED NASAL 4 TIMES DAILY PRN
Qty: 1 BOX | Refills: 3 | Status: SHIPPED | OUTPATIENT
Start: 2019-11-08 | End: 2020-05-15

## 2019-11-09 NOTE — PATIENT INSTRUCTIONS
Thank you for choosing us for your care. I have placed an order for a prescription so that you can start treatment. View your full visit summary for details by clicking on the link below. Your pharmacist will able to address any questions you may have about the medication.     If you're not feeling better within 5-7 days, please schedule an appointment.  You can schedule an appointment right here in [facility], or call [phone]  If the visit is for the same symptoms as your e-visit, we'll refund the cost of your e-visit if seen within seven days.      Sinusitis (Antibiotic Treatment)    The sinuses are air-filled spaces within the bones of the face. They connect to the inside of the nose. Sinusitis is an inflammation of the tissue that lines the sinuses. Sinusitis can occur during a cold. It can also happen due to allergies to pollens and other particles in the air. Sinusitis can cause symptoms of sinus congestion and a feeling of fullness. A sinus infection causes fever, headache, and facial pain. There is often green or yellow fluid draining from the nose or into the back of the throat (post-nasal drip). You have been given antibiotics to treat this condition.  Home care    Take the full course of antibiotics as instructed. Do not stop taking them, even when you feel better.    Drink plenty of water, hot tea, and other liquids. This may help thin nasal mucus. It also may help your sinuses drain fluids.    Heat may help soothe painful areas of your face. Use a towel soaked in hot water. Or,  the shower and direct the warm spray onto your face. Using a vaporizer along with a menthol rub at night may also help soothe symptoms.     An expectorant with guaifenesin may help thin nasal mucus and help your sinuses drain fluids.    You can use an over-the-counter decongestant, unless a similar medicine was prescribed to you. Nasal sprays work the fastest. Use one that contains phenylephrine or oxymetazoline.  First blow your nose gently. Then use the spray. Do not use these medicines more often than directed on the label. If you do, your symptoms may get worse. You may also take pills that contain pseudoephedrine. Don t use products that combine multiple medicines. This is because side effects may be increased. Read labels. You can also ask the pharmacist for help. (People with high blood pressure should not use decongestants. They can raise blood pressure.)    Over-the-counter antihistamines may help if allergies contributed to your sinusitis.      Do not use nasal rinses or irrigation during an acute sinus infection, unless your healthcare provider tells you to. Rinsing may spread the infection to other areas in your sinuses.    Use acetaminophen or ibuprofen to control pain, unless another pain medicine was prescribed to you. If you have chronic liver or kidney disease or ever had a stomach ulcer, talk with your healthcare provider before using these medicines. (Aspirin should never be taken by anyone under age 18 who is ill with a fever. It may cause severe liver damage.)    Don't smoke. This can make symptoms worse.  Follow-up care  Follow up with your healthcare provider or our staff if you are not better in 1 week.  When to seek medical advice  Call your healthcare provider if any of these occur:    Facial pain or headache that gets worse    Stiff neck    Unusual drowsiness or confusion    Swelling of your forehead or eyelids    Vision problems, such as blurred or double vision    Fever of 100.4 F (38 C) or higher, or as directed by your healthcare provider    Seizure    Breathing problems    Symptoms don't go away in 10 days  Prevention  Here are steps you can take to help prevent an infection:    Keep good hand washing habits.    Don t have close contact with people who have sore throats, colds, or other upper respiratory infections.    Don t smoke, and stay away from secondhand smoke.    Stay up to date with of  your vaccines.  Date Last Reviewed: 11/1/2017 2000-2018 [employer]. [address] [place] [address], [place] [address]. All rights reserved. This information is not intended as a substitute for professional medical care. Always follow your healthcare professional's instructions.

## 2019-12-05 ENCOUNTER — OFFICE VISIT - HEALTHEAST (OUTPATIENT)
Dept: RHEUMATOLOGY | Facility: CLINIC | Age: 59
End: 2019-12-05

## 2019-12-05 ENCOUNTER — MEDICAL CORRESPONDENCE (OUTPATIENT)
Dept: HEALTH INFORMATION MANAGEMENT | Facility: CLINIC | Age: 59
End: 2019-12-05

## 2019-12-05 DIAGNOSIS — Z79.899 HIGH RISK MEDICATION USE: ICD-10-CM

## 2019-12-05 DIAGNOSIS — M25.532 ACUTE PAIN OF LEFT WRIST: ICD-10-CM

## 2019-12-05 DIAGNOSIS — M05.9 SEROPOSITIVE RHEUMATOID ARTHRITIS (H): ICD-10-CM

## 2019-12-05 LAB
ERYTHROCYTE [DISTWIDTH] IN BLOOD BY AUTOMATED COUNT: 11.7 % (ref 11–14.5)
ERYTHROCYTE [SEDIMENTATION RATE] IN BLOOD BY WESTERGREN METHOD: 17 MM/HR (ref 0–20)
HCT VFR BLD AUTO: 37.1 % (ref 35–47)
HGB BLD-MCNC: 12.3 G/DL (ref 12–16)
MCH RBC QN AUTO: 30 PG (ref 27–34)
MCHC RBC AUTO-ENTMCNC: 33.2 G/DL (ref 32–36)
MCV RBC AUTO: 90 FL (ref 80–100)
PLATELET # BLD AUTO: 327 THOU/UL (ref 140–440)
PMV BLD AUTO: 6.7 FL (ref 7–10)
RBC # BLD AUTO: 4.11 MILL/UL (ref 3.8–5.4)
WBC: 7.5 THOU/UL (ref 4–11)

## 2019-12-06 LAB
ALBUMIN SERPL-MCNC: 3.8 G/DL (ref 3.5–5)
ALT SERPL W P-5'-P-CCNC: 24 U/L (ref 0–45)
AST SERPL W P-5'-P-CCNC: 22 U/L (ref 0–40)
C REACTIVE PROTEIN LHE: 0.4 MG/DL (ref 0–0.8)
CREAT SERPL-MCNC: 1.14 MG/DL (ref 0.6–1.1)
GFR SERPL CREATININE-BSD FRML MDRD: 49 ML/MIN/1.73M2
URATE SERPL-MCNC: 3.9 MG/DL (ref 2–7.5)

## 2019-12-10 LAB — ANCA IGG TITR SER IF: NORMAL {TITER}

## 2019-12-19 ENCOUNTER — AMBULATORY - HEALTHEAST (OUTPATIENT)
Dept: RHEUMATOLOGY | Facility: CLINIC | Age: 59
End: 2019-12-19

## 2019-12-19 DIAGNOSIS — M05.9 SEROPOSITIVE RHEUMATOID ARTHRITIS (H): ICD-10-CM

## 2019-12-30 ENCOUNTER — E-VISIT (OUTPATIENT)
Dept: PEDIATRICS | Facility: CLINIC | Age: 59
End: 2019-12-30
Payer: COMMERCIAL

## 2019-12-30 DIAGNOSIS — J01.90 ACUTE NON-RECURRENT SINUSITIS, UNSPECIFIED LOCATION: ICD-10-CM

## 2019-12-30 DIAGNOSIS — J01.90 ACUTE SINUSITIS, RECURRENCE NOT SPECIFIED, UNSPECIFIED LOCATION: Primary | ICD-10-CM

## 2019-12-30 PROCEDURE — 99444 ZZC PHYSICIAN ONLINE EVALUATION & MANAGEMENT SERVICE: CPT | Performed by: NURSE PRACTITIONER

## 2019-12-30 NOTE — PATIENT INSTRUCTIONS
Thank you for choosing us for your care. I have placed an order for a prescription so that you can start treatment. View your full visit summary for details by clicking on the link below. Your pharmacist will able to address any questions you may have about the medication.     If you're not feeling better within 5-7 days, please schedule an appointment.  You can schedule an appointment right here in [facility], or call [phone]  If the visit is for the same symptoms as your e-visit, we'll refund the cost of your e-visit if seen within seven days.      Sinusitis (Antibiotic Treatment)    The sinuses are air-filled spaces within the bones of the face. They connect to the inside of the nose. Sinusitis is an inflammation of the tissue that lines the sinuses. Sinusitis can occur during a cold. It can also happen due to allergies to pollens and other particles in the air. Sinusitis can cause symptoms of sinus congestion and a feeling of fullness. A sinus infection causes fever, headache, and facial pain. There is often green or yellow fluid draining from the nose or into the back of the throat (post-nasal drip). You have been given antibiotics to treat this condition.  Home care    Take the full course of antibiotics as instructed. Do not stop taking them, even when you feel better.    Drink plenty of water, hot tea, and other liquids. This may help thin nasal mucus. It also may help your sinuses drain fluids.    Heat may help soothe painful areas of your face. Use a towel soaked in hot water. Or,  the shower and direct the warm spray onto your face. Using a vaporizer along with a menthol rub at night may also help soothe symptoms.     An expectorant with guaifenesin may help thin nasal mucus and help your sinuses drain fluids.    You can use an over-the-counter decongestant, unless a similar medicine was prescribed to you. Nasal sprays work the fastest. Use one that contains phenylephrine or oxymetazoline.  First blow your nose gently. Then use the spray. Do not use these medicines more often than directed on the label. If you do, your symptoms may get worse. You may also take pills that contain pseudoephedrine. Don t use products that combine multiple medicines. This is because side effects may be increased. Read labels. You can also ask the pharmacist for help. (People with high blood pressure should not use decongestants. They can raise blood pressure.)    Over-the-counter antihistamines may help if allergies contributed to your sinusitis.      Do not use nasal rinses or irrigation during an acute sinus infection, unless your healthcare provider tells you to. Rinsing may spread the infection to other areas in your sinuses.    Use acetaminophen or ibuprofen to control pain, unless another pain medicine was prescribed to you. If you have chronic liver or kidney disease or ever had a stomach ulcer, talk with your healthcare provider before using these medicines. (Aspirin should never be taken by anyone under age 18 who is ill with a fever. It may cause severe liver damage.)    Don't smoke. This can make symptoms worse.  Follow-up care  Follow up with your healthcare provider or our staff if you are not better in 1 week.  When to seek medical advice  Call your healthcare provider if any of these occur:    Facial pain or headache that gets worse    Stiff neck    Unusual drowsiness or confusion    Swelling of your forehead or eyelids    Vision problems, such as blurred or double vision    Fever of 100.4 F (38 C) or higher, or as directed by your healthcare provider    Seizure    Breathing problems    Symptoms don't go away in 10 days  Prevention  Here are steps you can take to help prevent an infection:    Keep good hand washing habits.    Don t have close contact with people who have sore throats, colds, or other upper respiratory infections.    Don t smoke, and stay away from secondhand smoke.    Stay up to date with of  your vaccines.  Date Last Reviewed: 11/1/2017 2000-2018 [employer]. [address] [place] [address], [place] [address]. All rights reserved. This information is not intended as a substitute for professional medical care. Always follow your healthcare professional's instructions.

## 2020-02-16 ENCOUNTER — HEALTH MAINTENANCE LETTER (OUTPATIENT)
Age: 60
End: 2020-02-16

## 2020-03-13 ENCOUNTER — DOCUMENTATION ONLY (OUTPATIENT)
Dept: PEDIATRICS | Facility: CLINIC | Age: 60
End: 2020-03-13

## 2020-03-13 DIAGNOSIS — E03.9 HYPOTHYROIDISM, UNSPECIFIED TYPE: Primary | ICD-10-CM

## 2020-03-16 DIAGNOSIS — F32.0 MILD MAJOR DEPRESSION (H): ICD-10-CM

## 2020-03-18 DIAGNOSIS — Z79.899 ENCOUNTER FOR LONG-TERM (CURRENT) USE OF OTHER MEDICATIONS: Primary | ICD-10-CM

## 2020-03-18 DIAGNOSIS — M05.9 SEROPOSITIVE RHEUMATOID ARTHRITIS (H): ICD-10-CM

## 2020-03-18 DIAGNOSIS — E03.9 HYPOTHYROIDISM, UNSPECIFIED TYPE: ICD-10-CM

## 2020-03-18 LAB
ALBUMIN SERPL-MCNC: 3.5 G/DL (ref 3.4–5)
ALT SERPL W P-5'-P-CCNC: 67 U/L (ref 0–50)
AST SERPL W P-5'-P-CCNC: 51 U/L (ref 0–45)
CREAT SERPL-MCNC: 0.87 MG/DL (ref 0.52–1.04)
ERYTHROCYTE [DISTWIDTH] IN BLOOD BY AUTOMATED COUNT: 14.7 % (ref 10–15)
GFR SERPL CREATININE-BSD FRML MDRD: 74 ML/MIN/{1.73_M2}
HCT VFR BLD AUTO: 37.2 % (ref 35–47)
HGB BLD-MCNC: 12.2 G/DL (ref 11.7–15.7)
MCH RBC QN AUTO: 29.4 PG (ref 26.5–33)
MCHC RBC AUTO-ENTMCNC: 32.8 G/DL (ref 31.5–36.5)
MCV RBC AUTO: 90 FL (ref 78–100)
PLATELET # BLD AUTO: 323 10E9/L (ref 150–450)
RBC # BLD AUTO: 4.15 10E12/L (ref 3.8–5.2)
T4 FREE SERPL-MCNC: 0.82 NG/DL (ref 0.76–1.46)
TSH SERPL DL<=0.005 MIU/L-ACNC: 8.16 MU/L (ref 0.4–4)
WBC # BLD AUTO: 4 10E9/L (ref 4–11)

## 2020-03-18 PROCEDURE — 85027 COMPLETE CBC AUTOMATED: CPT | Performed by: INTERNAL MEDICINE

## 2020-03-18 PROCEDURE — 84443 ASSAY THYROID STIM HORMONE: CPT | Performed by: INTERNAL MEDICINE

## 2020-03-18 PROCEDURE — 84450 TRANSFERASE (AST) (SGOT): CPT | Performed by: INTERNAL MEDICINE

## 2020-03-18 PROCEDURE — 82565 ASSAY OF CREATININE: CPT | Performed by: INTERNAL MEDICINE

## 2020-03-18 PROCEDURE — 84439 ASSAY OF FREE THYROXINE: CPT | Performed by: INTERNAL MEDICINE

## 2020-03-18 PROCEDURE — 82040 ASSAY OF SERUM ALBUMIN: CPT | Performed by: INTERNAL MEDICINE

## 2020-03-18 PROCEDURE — 36415 COLL VENOUS BLD VENIPUNCTURE: CPT | Performed by: INTERNAL MEDICINE

## 2020-03-18 PROCEDURE — 84460 ALANINE AMINO (ALT) (SGPT): CPT | Performed by: INTERNAL MEDICINE

## 2020-03-18 RX ORDER — BUPROPION HYDROCHLORIDE 150 MG/1
150 TABLET ORAL EVERY MORNING
Qty: 90 TABLET | Refills: 0 | Status: SHIPPED | OUTPATIENT
Start: 2020-03-18 | End: 2020-06-15

## 2020-03-18 NOTE — TELEPHONE ENCOUNTER
The pt is aware of the providers message and she will call back to make an appointment.   Gauri Lopez on 3/18/2020 at 11:29 AM

## 2020-03-18 NOTE — TELEPHONE ENCOUNTER
Medication is being filled for 1 time refill only due to:  Patient needs to be seen because it has been more than one year since last visit.

## 2020-03-19 ENCOUNTER — OFFICE VISIT - HEALTHEAST (OUTPATIENT)
Dept: RHEUMATOLOGY | Facility: CLINIC | Age: 60
End: 2020-03-19

## 2020-03-19 ENCOUNTER — COMMUNICATION - HEALTHEAST (OUTPATIENT)
Dept: RHEUMATOLOGY | Facility: CLINIC | Age: 60
End: 2020-03-19

## 2020-03-19 DIAGNOSIS — M25.532 CHRONIC PAIN OF LEFT WRIST: ICD-10-CM

## 2020-03-19 DIAGNOSIS — Z79.899 HIGH RISK MEDICATION USE: ICD-10-CM

## 2020-03-19 DIAGNOSIS — M05.9 SEROPOSITIVE RHEUMATOID ARTHRITIS (H): ICD-10-CM

## 2020-03-19 DIAGNOSIS — G89.29 CHRONIC PAIN OF LEFT WRIST: ICD-10-CM

## 2020-03-19 DIAGNOSIS — R74.01 TRANSAMINITIS: ICD-10-CM

## 2020-04-26 DIAGNOSIS — E03.8 OTHER SPECIFIED HYPOTHYROIDISM: ICD-10-CM

## 2020-04-28 RX ORDER — LEVOTHYROXINE SODIUM 75 UG/1
TABLET ORAL
Qty: 90 TABLET | Refills: 0 | Status: SHIPPED | OUTPATIENT
Start: 2020-04-28 | End: 2020-07-15

## 2020-04-28 NOTE — TELEPHONE ENCOUNTER
"Requested Prescriptions   Pending Prescriptions Disp Refills     levothyroxine (SYNTHROID/LEVOTHROID) 75 MCG tablet [Pharmacy Med Name: LEVOTHYROXINE 0.075MG (75MCG) TABS] 90 tablet 0     Sig: TAKE 1 TABLET(75 MCG) BY MOUTH DAILY       Thyroid Protocol Failed - 4/26/2020 11:15 AM        Failed - Recent (12 mo) or future (30 days) visit within the authorizing provider's specialty     Patient has had an office visit with the authorizing provider or a provider within the authorizing providers department within the previous 12 mos or has a future within next 30 days. See \"Patient Info\" tab in inbasket, or \"Choose Columns\" in Meds & Orders section of the refill encounter.              Failed - Normal TSH on file in past 12 months     Recent Labs   Lab Test 03/18/20  0825   TSH 8.16*              Passed - Patient is 12 years or older        Passed - Medication is active on med list        Passed - No active pregnancy on record     If patient is pregnant or has had a positive pregnancy test, please check TSH.          Passed - No positive pregnancy test in past 12 months     If patient is pregnant or has had a positive pregnancy test, please check TSH.               Last office visit: 1/11/2019 with prescribing provider:     Future Office Visit:   Next 5 appointments (look out 90 days)    Jul 17, 2020  3:50 PM CDT  (Arrive by 3:25 PM)  Adult Preventative Visit with Yasmine Richards MD  [facility] ([facility]) [address] [place] [address] [phone]           Routing refill request to provider for review/approval because:  Labs out of range:  TSH  Patient needs to be seen because it has been more than 1 year since last office visit. Has appt scheduled 7/17/20      Maira Vick RN BSN, Pap Tracking            "

## 2020-05-13 ENCOUNTER — E-VISIT (OUTPATIENT)
Dept: PEDIATRICS | Facility: CLINIC | Age: 60
End: 2020-05-13
Payer: COMMERCIAL

## 2020-05-13 DIAGNOSIS — N39.0 ACUTE UTI (URINARY TRACT INFECTION): ICD-10-CM

## 2020-05-13 PROCEDURE — 99421 OL DIG E/M SVC 5-10 MIN: CPT | Performed by: INTERNAL MEDICINE

## 2020-05-15 RX ORDER — NITROFURANTOIN 25; 75 MG/1; MG/1
100 CAPSULE ORAL 2 TIMES DAILY
Qty: 14 CAPSULE | Refills: 0 | Status: CANCELLED | OUTPATIENT
Start: 2020-05-15

## 2020-05-15 RX ORDER — AMOXICILLIN AND CLAVULANATE POTASSIUM 500; 125 MG/1; MG/1
1 TABLET, FILM COATED ORAL 2 TIMES DAILY
Qty: 14 TABLET | Refills: 0 | Status: SHIPPED | OUTPATIENT
Start: 2020-05-15 | End: 2020-05-22

## 2020-05-15 NOTE — TELEPHONE ENCOUNTER
"Called the pt. No answer. LVMTCB.    If the pt calls back: Please inform pt that Dr Richards had responded to their E-visit (on 05/13/20), but it appears (for whatever reason) there MyChart became inactive. A new MyChart activation was sent to their email.    Please review Dr Richards's response for E-visit. Then route back response to PCP, or if pt is uncomfortable answering questions, route to PAL.      - Eliseo \"Jose Miguel\" DENTON Rea - Patient Advocate Liason (PAL)  [facility]    "

## 2020-05-15 NOTE — TELEPHONE ENCOUNTER
Please call pt - her mychart got discontinued and I don't know if she saw this message.  Please reactivate and check on how she is doing

## 2020-05-15 NOTE — PATIENT INSTRUCTIONS
Thank you for choosing us for your care. I have placed an order for a prescription so that you can start treatment. View your full visit summary for details by clicking on the link below. Your pharmacist will able to address any questions you may have about the medication.     If you re not feeling better within 2-3 days, please schedule an appointment.  You can schedule an appointment right here in [employer], or call [phone]  If the visit is for the same symptoms as your e-visit, we ll refund the cost of your e-visit if seen within seven days.      Urinary Tract Infections in Women    Urinary tract infections (UTIs) are most often caused by bacteria. These bacteria enter the urinary tract. The bacteria may come from outside the body. Or they may travel from the skin outside the rectum or vagina into the urethra. Female anatomy makes it easy for bacteria from the bowel to enter a woman s urinary tract, which is the most common source of UTI. This means women develop UTIs more often than men. Pain in or around the urinary tract is a common UTI symptom. But the only way to know for sure if you have a UTI for the healthcare provider to test your urine. The two tests that may be done are the urinalysis and urine culture.  Types of UTIs    Cystitis. A bladder infection (cystitis) is the most common UTI in women. You may have urgent or frequent urination. You may also have pain, burning when you urinate, and bloody urine.    Urethritis. This is an inflamed urethra, which is the tube that carries urine from the bladder to outside the body. You may have lower stomach or back pain. You may also have urgent or frequent urination.    Pyelonephritis. This is a kidney infection. If not treated, it can be serious and damage your kidneys. In severe cases, you may need to stay in the hospital. You may have a fever and lower back pain.  Medicines to treat a UTI  Most UTIs are treated with antibiotics. These kill the bacteria.  The length of time you need to take them depends on the type of infection. It may be as short as 3 days. If you have repeated UTIs, you may need a low-dose antibiotic for several months. Take antibiotics exactly as directed. Don t stop taking them until all of the medicine is gone. If you stop taking the antibiotic too soon, the infection may not go away. You may also develop a resistance to the antibiotic. This can make it much harder to treat.  Lifestyle changes to treat and prevent UTIs  The lifestyle changes below will help get rid of your UTI. They may also help prevent future UTIs.    Drink plenty of fluids. This includes water, juice, or other caffeine-free drinks. Fluids help flush bacteria out of your body.    Empty your bladder. Always empty your bladder when you feel the urge to urinate. And always urinate before going to sleep. Urine that stays in your bladder can lead to infection. Try to urinate before and after sex as well.    Practice good personal hygiene. Wipe yourself from front to back after using the toilet. This helps keep bacteria from getting into the urethra.    Use condoms during sex. These help prevent UTIs caused by sexually transmitted bacteria. Also don't use spermicides during sex. These can increase the risk for UTIs. Choose other forms of birth control instead. For women who tend to get UTIs after sex, a low-dose of a preventive antibiotic may be used. Be sure to discuss this option with your healthcare provider.    Follow up with your healthcare provider as directed. He or she may test to make sure the infection has cleared. If needed, more treatment may be started.  Date Last Reviewed: 1/1/2017 2000-2019 [employer]. [address] [place] [address], [place] [address]. All rights reserved. This information is not intended as a substitute for professional medical care. Always follow your healthcare professional's instructions.

## 2020-05-15 NOTE — TELEPHONE ENCOUNTER
"Pt called back.    Denies current fever, chill, flank pain.    Pt states that they felt that they had describes \"difficulty passing urine\" poorly, instead has urination has been painful/dysuria.    Pt admits to last treatment effective. Denies significant side-effects. Reviewed with pt that if symptoms do not improve after a few days or worsen to inform us.    Reviewed pharmacy and possible delivery of medication if concerned about social distancing and exposure.      - Eliseo \"Jose Miguel\" DENTON Rea - Patient Advocate Liason (PAL)  [facility]    "

## 2020-06-03 ENCOUNTER — TRANSFERRED RECORDS (OUTPATIENT)
Dept: HEALTH INFORMATION MANAGEMENT | Facility: CLINIC | Age: 60
End: 2020-06-03

## 2020-06-04 ENCOUNTER — TRANSFERRED RECORDS (OUTPATIENT)
Dept: HEALTH INFORMATION MANAGEMENT | Facility: CLINIC | Age: 60
End: 2020-06-04

## 2020-06-13 DIAGNOSIS — F32.0 MILD MAJOR DEPRESSION (H): ICD-10-CM

## 2020-06-15 RX ORDER — BUPROPION HYDROCHLORIDE 150 MG/1
TABLET ORAL
Qty: 90 TABLET | Refills: 0 | Status: SHIPPED | OUTPATIENT
Start: 2020-06-15 | End: 2020-09-08

## 2020-06-15 NOTE — TELEPHONE ENCOUNTER
Routing refill request to provider for review/approval because:  Darlene given x1 and patient did not follow up, please advise  Patient needs to be seen because it has been more than 1 year since last office visit.

## 2020-06-25 DIAGNOSIS — E03.8 OTHER SPECIFIED HYPOTHYROIDISM: Primary | ICD-10-CM

## 2020-06-26 ENCOUNTER — ANCILLARY PROCEDURE (OUTPATIENT)
Dept: MAMMOGRAPHY | Facility: CLINIC | Age: 60
End: 2020-06-26
Attending: INTERNAL MEDICINE
Payer: COMMERCIAL

## 2020-06-26 DIAGNOSIS — Z12.31 VISIT FOR SCREENING MAMMOGRAM: ICD-10-CM

## 2020-06-26 DIAGNOSIS — E03.8 OTHER SPECIFIED HYPOTHYROIDISM: ICD-10-CM

## 2020-06-26 PROCEDURE — 77063 BREAST TOMOSYNTHESIS BI: CPT

## 2020-06-26 PROCEDURE — 77067 SCR MAMMO BI INCL CAD: CPT | Mod: TC

## 2020-07-14 DIAGNOSIS — E03.8 OTHER SPECIFIED HYPOTHYROIDISM: ICD-10-CM

## 2020-07-14 DIAGNOSIS — Z79.899 ENCOUNTER FOR LONG-TERM (CURRENT) USE OF OTHER MEDICATIONS: ICD-10-CM

## 2020-07-14 DIAGNOSIS — M05.9 SEROPOSITIVE RHEUMATOID ARTHRITIS (H): ICD-10-CM

## 2020-07-14 LAB
ALBUMIN SERPL-MCNC: 3.5 G/DL (ref 3.4–5)
ALT SERPL W P-5'-P-CCNC: 36 U/L (ref 0–50)
AST SERPL W P-5'-P-CCNC: 32 U/L (ref 0–45)
CREAT SERPL-MCNC: 0.92 MG/DL (ref 0.52–1.04)
ERYTHROCYTE [DISTWIDTH] IN BLOOD BY AUTOMATED COUNT: 13.8 % (ref 10–15)
GFR SERPL CREATININE-BSD FRML MDRD: 69 ML/MIN/{1.73_M2}
HCT VFR BLD AUTO: 35.3 % (ref 35–47)
HGB BLD-MCNC: 11.4 G/DL (ref 11.7–15.7)
MCH RBC QN AUTO: 29.6 PG (ref 26.5–33)
MCHC RBC AUTO-ENTMCNC: 32.3 G/DL (ref 31.5–36.5)
MCV RBC AUTO: 92 FL (ref 78–100)
PLATELET # BLD AUTO: 255 10E9/L (ref 150–450)
RBC # BLD AUTO: 3.85 10E12/L (ref 3.8–5.2)
T4 FREE SERPL-MCNC: 1.04 NG/DL (ref 0.76–1.46)
TSH SERPL DL<=0.005 MIU/L-ACNC: 5.5 MU/L (ref 0.4–4)
WBC # BLD AUTO: 4.2 10E9/L (ref 4–11)

## 2020-07-14 PROCEDURE — 84439 ASSAY OF FREE THYROXINE: CPT | Performed by: INTERNAL MEDICINE

## 2020-07-14 PROCEDURE — 84450 TRANSFERASE (AST) (SGOT): CPT | Performed by: INTERNAL MEDICINE

## 2020-07-14 PROCEDURE — 84443 ASSAY THYROID STIM HORMONE: CPT | Performed by: INTERNAL MEDICINE

## 2020-07-14 PROCEDURE — 82565 ASSAY OF CREATININE: CPT | Performed by: INTERNAL MEDICINE

## 2020-07-14 PROCEDURE — 85027 COMPLETE CBC AUTOMATED: CPT | Performed by: INTERNAL MEDICINE

## 2020-07-14 PROCEDURE — 84460 ALANINE AMINO (ALT) (SGPT): CPT | Performed by: INTERNAL MEDICINE

## 2020-07-14 PROCEDURE — 36415 COLL VENOUS BLD VENIPUNCTURE: CPT | Performed by: INTERNAL MEDICINE

## 2020-07-14 PROCEDURE — 82040 ASSAY OF SERUM ALBUMIN: CPT | Performed by: INTERNAL MEDICINE

## 2020-07-15 DIAGNOSIS — E03.8 OTHER SPECIFIED HYPOTHYROIDISM: ICD-10-CM

## 2020-07-15 RX ORDER — LEVOTHYROXINE SODIUM 75 UG/1
TABLET ORAL
Qty: 90 TABLET | Refills: 0 | Status: SHIPPED | OUTPATIENT
Start: 2020-07-15 | End: 2020-10-14

## 2020-07-23 ENCOUNTER — OFFICE VISIT - HEALTHEAST (OUTPATIENT)
Dept: RHEUMATOLOGY | Facility: CLINIC | Age: 60
End: 2020-07-23

## 2020-07-23 DIAGNOSIS — Z79.899 HIGH RISK MEDICATION USE: ICD-10-CM

## 2020-07-23 DIAGNOSIS — M05.9 SEROPOSITIVE RHEUMATOID ARTHRITIS (H): ICD-10-CM

## 2020-08-04 ENCOUNTER — E-VISIT (OUTPATIENT)
Dept: PEDIATRICS | Facility: CLINIC | Age: 60
End: 2020-08-04
Payer: COMMERCIAL

## 2020-08-04 DIAGNOSIS — N39.0 ACUTE UTI (URINARY TRACT INFECTION): Primary | ICD-10-CM

## 2020-08-04 DIAGNOSIS — D84.9 IMMUNOSUPPRESSION (H): ICD-10-CM

## 2020-08-04 PROCEDURE — 99421 OL DIG E/M SVC 5-10 MIN: CPT | Performed by: INTERNAL MEDICINE

## 2020-08-04 RX ORDER — FOLIC ACID 0.8 MG
800 TABLET ORAL
COMMUNITY

## 2020-08-04 RX ORDER — NITROFURANTOIN 25; 75 MG/1; MG/1
100 CAPSULE ORAL 2 TIMES DAILY
Qty: 14 CAPSULE | Refills: 0 | Status: SHIPPED | OUTPATIENT
Start: 2020-08-04 | End: 2020-10-22

## 2020-08-04 NOTE — TELEPHONE ENCOUNTER
Provider E-Visit time total (minutes): 7    Last UTI in May and treated with augmentin.  Immunosuppressed for her RA.  No symptoms of pyelo and sulfa allergy so will treat with nitrofurantoin.

## 2020-08-04 NOTE — PATIENT INSTRUCTIONS
Thank you for choosing us for your care. I have placed an order for a prescription so that you can start treatment. View your full visit summary for details by clicking on the link below. Your pharmacist will able to address any questions you may have about the medication.     If you re not feeling better within 2-3 days, please schedule an appointment.  You can schedule an appointment right here in [employer], or call [phone]  If the visit is for the same symptoms as your e-visit, we ll refund the cost of your e-visit if seen within seven days.      Urinary Tract Infections in Women    Urinary tract infections (UTIs) are most often caused by bacteria. These bacteria enter the urinary tract. The bacteria may come from outside the body. Or they may travel from the skin outside the rectum or vagina into the urethra. Female anatomy makes it easy for bacteria from the bowel to enter a woman s urinary tract, which is the most common source of UTI. This means women develop UTIs more often than men. Pain in or around the urinary tract is a common UTI symptom. But the only way to know for sure if you have a UTI for the healthcare provider to test your urine. The two tests that may be done are the urinalysis and urine culture.  Types of UTIs    Cystitis. A bladder infection (cystitis) is the most common UTI in women. You may have urgent or frequent urination. You may also have pain, burning when you urinate, and bloody urine.    Urethritis. This is an inflamed urethra, which is the tube that carries urine from the bladder to outside the body. You may have lower stomach or back pain. You may also have urgent or frequent urination.    Pyelonephritis. This is a kidney infection. If not treated, it can be serious and damage your kidneys. In severe cases, you may need to stay in the hospital. You may have a fever and lower back pain.  Medicines to treat a UTI  Most UTIs are treated with antibiotics. These kill the bacteria.  The length of time you need to take them depends on the type of infection. It may be as short as 3 days. If you have repeated UTIs, you may need a low-dose antibiotic for several months. Take antibiotics exactly as directed. Don t stop taking them until all of the medicine is gone. If you stop taking the antibiotic too soon, the infection may not go away. You may also develop a resistance to the antibiotic. This can make it much harder to treat.  Lifestyle changes to treat and prevent UTIs  The lifestyle changes below will help get rid of your UTI. They may also help prevent future UTIs.    Drink plenty of fluids. This includes water, juice, or other caffeine-free drinks. Fluids help flush bacteria out of your body.    Empty your bladder. Always empty your bladder when you feel the urge to urinate. And always urinate before going to sleep. Urine that stays in your bladder can lead to infection. Try to urinate before and after sex as well.    Practice good personal hygiene. Wipe yourself from front to back after using the toilet. This helps keep bacteria from getting into the urethra.    Use condoms during sex. These help prevent UTIs caused by sexually transmitted bacteria. Also don't use spermicides during sex. These can increase the risk for UTIs. Choose other forms of birth control instead. For women who tend to get UTIs after sex, a low-dose of a preventive antibiotic may be used. Be sure to discuss this option with your healthcare provider.    Follow up with your healthcare provider as directed. He or she may test to make sure the infection has cleared. If needed, more treatment may be started.  Date Last Reviewed: 1/1/2017 2000-2019 [employer]. [address] [place] [address], [place] [address]. All rights reserved. This information is not intended as a substitute for professional medical care. Always follow your healthcare professional's instructions.

## 2020-09-01 ENCOUNTER — RECORDS - HEALTHEAST (OUTPATIENT)
Dept: ADMINISTRATIVE | Facility: OTHER | Age: 60
End: 2020-09-01

## 2020-09-05 DIAGNOSIS — F32.0 MILD MAJOR DEPRESSION (H): ICD-10-CM

## 2020-09-08 RX ORDER — BUPROPION HYDROCHLORIDE 150 MG/1
TABLET ORAL
Qty: 90 TABLET | Refills: 0 | Status: SHIPPED | OUTPATIENT
Start: 2020-09-08 | End: 2020-10-27

## 2020-09-08 NOTE — TELEPHONE ENCOUNTER
Routing refill request to provider for review/approval because:  Labs not current:  PHQ    Riccardo CAIN RN, BSN

## 2020-09-14 ENCOUNTER — COMMUNICATION - HEALTHEAST (OUTPATIENT)
Dept: RHEUMATOLOGY | Facility: CLINIC | Age: 60
End: 2020-09-14

## 2020-09-14 DIAGNOSIS — M05.9 SEROPOSITIVE RHEUMATOID ARTHRITIS (H): ICD-10-CM

## 2020-10-13 DIAGNOSIS — F32.0 MILD MAJOR DEPRESSION (H): ICD-10-CM

## 2020-10-13 DIAGNOSIS — E03.8 OTHER SPECIFIED HYPOTHYROIDISM: ICD-10-CM

## 2020-10-14 RX ORDER — BUPROPION HYDROCHLORIDE 150 MG/1
TABLET ORAL
Qty: 163 TABLET | OUTPATIENT
Start: 2020-10-14

## 2020-10-14 RX ORDER — LEVOTHYROXINE SODIUM 75 UG/1
TABLET ORAL
Qty: 90 TABLET | Refills: 0 | Status: SHIPPED | OUTPATIENT
Start: 2020-10-14 | End: 2021-01-11

## 2020-10-16 ENCOUNTER — COMMUNICATION - HEALTHEAST (OUTPATIENT)
Dept: ADMINISTRATIVE | Facility: CLINIC | Age: 60
End: 2020-10-16

## 2020-10-16 DIAGNOSIS — M05.9 SEROPOSITIVE RHEUMATOID ARTHRITIS (H): ICD-10-CM

## 2020-10-22 ENCOUNTER — OFFICE VISIT (OUTPATIENT)
Dept: PEDIATRICS | Facility: CLINIC | Age: 60
End: 2020-10-22
Payer: COMMERCIAL

## 2020-10-22 VITALS
SYSTOLIC BLOOD PRESSURE: 110 MMHG | HEART RATE: 87 BPM | BODY MASS INDEX: 31.56 KG/M2 | OXYGEN SATURATION: 97 % | DIASTOLIC BLOOD PRESSURE: 72 MMHG | WEIGHT: 171.5 LBS | TEMPERATURE: 98.1 F | RESPIRATION RATE: 16 BRPM | HEIGHT: 62 IN

## 2020-10-22 DIAGNOSIS — F32.0 MILD MAJOR DEPRESSION (H): ICD-10-CM

## 2020-10-22 DIAGNOSIS — E03.9 HYPOTHYROIDISM, UNSPECIFIED TYPE: ICD-10-CM

## 2020-10-22 DIAGNOSIS — Z79.899 HISTORY OF LONG-TERM TREATMENT WITH HIGH-RISK MEDICATION: ICD-10-CM

## 2020-10-22 DIAGNOSIS — Z00.00 ROUTINE GENERAL MEDICAL EXAMINATION AT A HEALTH CARE FACILITY: Primary | ICD-10-CM

## 2020-10-22 DIAGNOSIS — Z12.4 SCREENING FOR MALIGNANT NEOPLASM OF CERVIX: ICD-10-CM

## 2020-10-22 DIAGNOSIS — M05.9 SEROPOSITIVE RHEUMATOID ARTHRITIS (H): ICD-10-CM

## 2020-10-22 LAB
ERYTHROCYTE [DISTWIDTH] IN BLOOD BY AUTOMATED COUNT: 13.8 % (ref 10–15)
HCT VFR BLD AUTO: 35.8 % (ref 35–47)
HGB BLD-MCNC: 12.1 G/DL (ref 11.7–15.7)
MCH RBC QN AUTO: 30.3 PG (ref 26.5–33)
MCHC RBC AUTO-ENTMCNC: 33.8 G/DL (ref 31.5–36.5)
MCV RBC AUTO: 90 FL (ref 78–100)
PLATELET # BLD AUTO: 291 10E9/L (ref 150–450)
RBC # BLD AUTO: 4 10E12/L (ref 3.8–5.2)
WBC # BLD AUTO: 5.6 10E9/L (ref 4–11)

## 2020-10-22 PROCEDURE — 99396 PREV VISIT EST AGE 40-64: CPT | Mod: GC | Performed by: STUDENT IN AN ORGANIZED HEALTH CARE EDUCATION/TRAINING PROGRAM

## 2020-10-22 PROCEDURE — 87624 HPV HI-RISK TYP POOLED RSLT: CPT | Performed by: INTERNAL MEDICINE

## 2020-10-22 PROCEDURE — 84443 ASSAY THYROID STIM HORMONE: CPT | Performed by: INTERNAL MEDICINE

## 2020-10-22 PROCEDURE — 82040 ASSAY OF SERUM ALBUMIN: CPT | Performed by: INTERNAL MEDICINE

## 2020-10-22 PROCEDURE — 84460 ALANINE AMINO (ALT) (SGPT): CPT | Performed by: INTERNAL MEDICINE

## 2020-10-22 PROCEDURE — 85027 COMPLETE CBC AUTOMATED: CPT | Performed by: INTERNAL MEDICINE

## 2020-10-22 PROCEDURE — G0123 SCREEN CERV/VAG THIN LAYER: HCPCS | Performed by: INTERNAL MEDICINE

## 2020-10-22 PROCEDURE — 84450 TRANSFERASE (AST) (SGOT): CPT | Performed by: INTERNAL MEDICINE

## 2020-10-22 PROCEDURE — 36415 COLL VENOUS BLD VENIPUNCTURE: CPT | Performed by: INTERNAL MEDICINE

## 2020-10-22 PROCEDURE — 82565 ASSAY OF CREATININE: CPT | Performed by: INTERNAL MEDICINE

## 2020-10-22 ASSESSMENT — ENCOUNTER SYMPTOMS
HEMATOCHEZIA: 0
JOINT SWELLING: 0
DIARRHEA: 0
ARTHRALGIAS: 0
DIZZINESS: 0
ABDOMINAL PAIN: 0
NERVOUS/ANXIOUS: 0
CHILLS: 0
HEMATURIA: 0
COUGH: 0
CONSTIPATION: 0
HEARTBURN: 0
FEVER: 0
EYE PAIN: 0
FREQUENCY: 0
HEADACHES: 0

## 2020-10-22 ASSESSMENT — MIFFLIN-ST. JEOR: SCORE: 1311.17

## 2020-10-22 NOTE — PROGRESS NOTES
SUBJECTIVE:   CC: Mae Connors is an 60 year old woman who presents for preventive health visit.     Patient has been advised of split billing requirements and indicates understanding: Yes  Healthy Habits:     Getting at least 3 servings of Calcium per day:  Yes    Bi-annual eye exam:  Yes    Dental care twice a year:  Yes    Sleep apnea or symptoms of sleep apnea:  None    Diet:  Regular (no restrictions)    Frequency of exercise:  2-3 days/week    Duration of exercise:  15-30 minutes    Taking medications regularly:  Yes    Medication side effects:  None    PHQ-2 Total Score: 0    Additional concerns today:  No      Depression:  Today's PHQ-2 Score:   PHQ-2 ( 1999 Pfizer) 10/22/2020   Q1: Little interest or pleasure in doing things 0   Q2: Feeling down, depressed or hopeless 0   PHQ-2 Score 0   Q1: Little interest or pleasure in doing things Not at all   Q2: Feeling down, depressed or hopeless Not at all   PHQ-2 Score 0     PHQ 12/20/2016 8/3/2017 1/11/2019   PHQ-9 Total Score 7 0 6   Q9: Thoughts of better off dead/self-harm past 2 weeks Not at all Not at all Not at all     Mood has been stable.  She has been taking Wellbutrin 150 mg for the last year with good effects.  She feels that her moods are just even keel and she is able to deal with the ups and downs in life without significant changes in her mood.    Hypothyroidism:  She has been taking levothyroxine 75 mcg and feeling good.  No constipation, diarrhea, hair loss, significant weight loss or weight gain, or skin changes.    Rheumatoid arthritis:   This has been well controlled lately.  She follows with rheumatology every 4 months.  Medications include Plaquenil and methotrexate.  She has not been on steroids recently but also has not had a DEXA scan yet.  She will discuss status at her upcoming appointment in November with her rheumatologist.      Abuse: Current or Past (Physical, Sexual or Emotional) - No  Do you feel safe in your environment?  "Yes        Social History     Tobacco Use     Smoking status: Never Smoker     Smokeless tobacco: Never Used   Substance Use Topics     Alcohol use: Yes     Alcohol/week: 0.0 - 1.0 standard drinks     Comment: occ.     If you drink alcohol do you typically have >3 drinks per day or >7 drinks per week? No    Alcohol Use 10/22/2020   Prescreen: >3 drinks/day or >7 drinks/week? No   Prescreen: >3 drinks/day or >7 drinks/week? -   No flowsheet data found.    Reviewed orders with patient.  Reviewed health maintenance and updated orders accordingly - Yes  Lab work is in process    Mammogram Screening: Patient over age 50, mutual decision to screen reflected in health maintenance.    Pertinent mammograms are reviewed under the imaging tab.  History of abnormal Pap smear: NO - age 30-65 PAP every 5 years with negative HPV co-testing recommended  PAP / HPV 10/24/2014 9/20/2011 9/16/2008   PAP NIL NIL NIL     Reviewed and updated as needed this visit by clinical staff  Tobacco  Allergies  Meds  Problems             Reviewed and updated as needed this visit by Provider    Meds  Problems                Review of Systems   Constitutional: Negative for chills and fever.   HENT: Positive for congestion. Negative for ear pain and hearing loss.    Eyes: Negative for pain.   Respiratory: Negative for cough.    Cardiovascular: Negative for chest pain and peripheral edema.   Gastrointestinal: Negative for abdominal pain, constipation, diarrhea, heartburn and hematochezia.   Genitourinary: Negative for frequency, genital sores and hematuria.   Musculoskeletal: Negative for arthralgias and joint swelling.   Neurological: Negative for dizziness and headaches.   Psychiatric/Behavioral: Negative for mood changes. The patient is not nervous/anxious.         OBJECTIVE:   /72 (BP Location: Right arm, Patient Position: Chair, Cuff Size: Adult Regular)   Pulse 87   Temp 98.1  F (36.7  C) (Tympanic)   Resp 16   Ht 1.575 m (5' 2\")  "  Wt 77.8 kg (171 lb 8 oz)   LMP 10/13/2014 (Approximate)   SpO2 97%   BMI 31.37 kg/m    Physical Exam  GENERAL: healthy, alert and no distress  EYES: Eyes grossly normal to inspection, PERRL and conjunctivae and sclerae normal  HENT: ear canals and TM's normal, nose and mouth without ulcers or lesions  NECK: no adenopathy, no asymmetry, masses, or scars and thyroid normal to palpation  RESP: lungs clear to auscultation - no rales, rhonchi or wheezes  BREAST: normal without masses, tenderness or nipple discharge and no palpable axillary masses or adenopathy  CV: regular rate and rhythm, normal S1 S2, no S3 or S4, no murmur, click or rub, no peripheral edema and peripheral pulses strong  ABDOMEN: soft, nontender, no hepatosplenomegaly, no masses and bowel sounds normal   (female): normal female external genitalia, normal urethral meatus, vaginal mucosa pink, moist, well rugated, and normal cervix/adnexa/uterus without masses or discharge  MS: no gross musculoskeletal defects noted, no edema  SKIN: no suspicious lesions or rashes  NEURO: Normal strength and tone, mentation intact and speech normal  PSYCH: mentation appears normal, affect normal/bright    Diagnostic Test Results:  Labs reviewed in Epic    ASSESSMENT/PLAN:   1. Routine general medical examination at a health care facility  Pap smear completed as below.  Already completed mammogram this year which was normal.  Up-to-date on immunizations including annual flu shot.  Last colonoscopy was in 2012 and recommended repeat in 2022 at the 10-year interval.  - REVIEW OF HEALTH MAINTENANCE PROTOCOL ORDERS    2. Screening for malignant neoplasm of cervix  - Pap imaged thin layer screen with HPV - recommended age 30 - 65 years (select HPV order below)    3. Hypothyroidism, unspecified type  No symptoms currently.   - continue synthroid 75 mcg daily - will follow up after lab back  - TSH with free T4 reflex    Patient has been advised of split billing  "requirements and indicates understanding: Yes  COUNSELING:  Special attention given to:        Regular exercise       Vision screening       Hearing screening       Osteoporosis Prevention/Bone Health    Estimated body mass index is 31.37 kg/m  as calculated from the following:    Height as of this encounter: 1.575 m (5' 2\").    Weight as of this encounter: 77.8 kg (171 lb 8 oz).    Weight management plan: Discussed healthy diet and exercise guidelines    She reports that she has never smoked. She has never used smokeless tobacco.      Counseling Resources:  ATP IV Guidelines  Pooled Cohorts Equation Calculator  Breast Cancer Risk Calculator  BRCA-Related Cancer Risk Assessment: FHS-7 Tool  FRAX Risk Assessment  ICSI Preventive Guidelines  Dietary Guidelines for Americans, 2010  USDA's MyPlate  ASA Prophylaxis  Lung CA Screening    Patient seen and discussed with Dr. Batres.    Kaycee Bradley MD  Internal Medicine & Pediatrics PGY4  [facility]      I have seen and discussed the patient with Dr. Bradley and agree with the jointly developed plan as documented above.    Kaycee Batres MD   Internal Medicine-Pediatrics     "

## 2020-10-23 LAB
ALBUMIN SERPL-MCNC: 3.8 G/DL (ref 3.4–5)
ALT SERPL W P-5'-P-CCNC: 37 U/L (ref 0–50)
AST SERPL W P-5'-P-CCNC: 28 U/L (ref 0–45)
CREAT SERPL-MCNC: 0.88 MG/DL (ref 0.52–1.04)
GFR SERPL CREATININE-BSD FRML MDRD: 72 ML/MIN/{1.73_M2}
TSH SERPL DL<=0.005 MIU/L-ACNC: 2.85 MU/L (ref 0.4–4)

## 2020-10-27 ENCOUNTER — MYC REFILL (OUTPATIENT)
Dept: PEDIATRICS | Facility: CLINIC | Age: 60
End: 2020-10-27

## 2020-10-27 DIAGNOSIS — F32.0 MILD MAJOR DEPRESSION (H): ICD-10-CM

## 2020-10-27 RX ORDER — BUPROPION HYDROCHLORIDE 150 MG/1
TABLET ORAL
Qty: 90 TABLET | Refills: 0 | Status: CANCELLED | OUTPATIENT
Start: 2020-10-27

## 2020-10-28 LAB
COPATH REPORT: NORMAL
PAP: NORMAL

## 2020-10-30 ENCOUNTER — PATIENT OUTREACH (OUTPATIENT)
Dept: FAMILY MEDICINE | Facility: CLINIC | Age: 60
End: 2020-10-30

## 2020-10-30 LAB
FINAL DIAGNOSIS: NORMAL
HPV HR 12 DNA CVX QL NAA+PROBE: NEGATIVE
HPV16 DNA SPEC QL NAA+PROBE: NEGATIVE
HPV18 DNA SPEC QL NAA+PROBE: NEGATIVE
SPECIMEN DESCRIPTION: NORMAL
SPECIMEN SOURCE CVX/VAG CYTO: NORMAL

## 2020-11-17 ENCOUNTER — OFFICE VISIT - HEALTHEAST (OUTPATIENT)
Dept: RHEUMATOLOGY | Facility: CLINIC | Age: 60
End: 2020-11-17

## 2020-11-17 DIAGNOSIS — Z79.899 HIGH RISK MEDICATION USE: ICD-10-CM

## 2020-11-17 DIAGNOSIS — G89.29 CHRONIC PAIN OF LEFT WRIST: ICD-10-CM

## 2020-11-17 DIAGNOSIS — M25.532 CHRONIC PAIN OF LEFT WRIST: ICD-10-CM

## 2020-11-17 DIAGNOSIS — M05.9 SEROPOSITIVE RHEUMATOID ARTHRITIS (H): ICD-10-CM

## 2021-01-21 ENCOUNTER — PATIENT OUTREACH (OUTPATIENT)
Dept: PEDIATRICS | Facility: CLINIC | Age: 61
End: 2021-01-21

## 2021-01-21 DIAGNOSIS — R87.615 UNSATISFACTORY CERVICAL PAPANICOLAOU SMEAR: ICD-10-CM

## 2021-02-25 ENCOUNTER — OFFICE VISIT (OUTPATIENT)
Dept: FAMILY MEDICINE | Facility: CLINIC | Age: 61
End: 2021-02-25
Payer: COMMERCIAL

## 2021-02-25 VITALS
WEIGHT: 175.2 LBS | RESPIRATION RATE: 17 BRPM | HEIGHT: 62 IN | OXYGEN SATURATION: 95 % | TEMPERATURE: 98.6 F | HEART RATE: 74 BPM | BODY MASS INDEX: 32.24 KG/M2 | DIASTOLIC BLOOD PRESSURE: 72 MMHG | SYSTOLIC BLOOD PRESSURE: 120 MMHG

## 2021-02-25 DIAGNOSIS — Z79.899 HISTORY OF LONG-TERM TREATMENT WITH HIGH-RISK MEDICATION: ICD-10-CM

## 2021-02-25 DIAGNOSIS — M05.9 SEROPOSITIVE RHEUMATOID ARTHRITIS (H): ICD-10-CM

## 2021-02-25 DIAGNOSIS — Z12.4 CERVICAL CANCER SCREENING: Primary | ICD-10-CM

## 2021-02-25 LAB
ERYTHROCYTE [DISTWIDTH] IN BLOOD BY AUTOMATED COUNT: 13.7 % (ref 10–15)
HCT VFR BLD AUTO: 35.7 % (ref 35–47)
HGB BLD-MCNC: 11.3 G/DL (ref 11.7–15.7)
MCH RBC QN AUTO: 29.4 PG (ref 26.5–33)
MCHC RBC AUTO-ENTMCNC: 31.7 G/DL (ref 31.5–36.5)
MCV RBC AUTO: 93 FL (ref 78–100)
PLATELET # BLD AUTO: 320 10E9/L (ref 150–450)
RBC # BLD AUTO: 3.84 10E12/L (ref 3.8–5.2)
WBC # BLD AUTO: 5.3 10E9/L (ref 4–11)

## 2021-02-25 PROCEDURE — 36415 COLL VENOUS BLD VENIPUNCTURE: CPT | Performed by: INTERNAL MEDICINE

## 2021-02-25 PROCEDURE — 84460 ALANINE AMINO (ALT) (SGPT): CPT | Performed by: INTERNAL MEDICINE

## 2021-02-25 PROCEDURE — 82565 ASSAY OF CREATININE: CPT | Performed by: INTERNAL MEDICINE

## 2021-02-25 PROCEDURE — 99213 OFFICE O/P EST LOW 20 MIN: CPT | Performed by: PHYSICIAN ASSISTANT

## 2021-02-25 PROCEDURE — 87624 HPV HI-RISK TYP POOLED RSLT: CPT | Performed by: PHYSICIAN ASSISTANT

## 2021-02-25 PROCEDURE — 82040 ASSAY OF SERUM ALBUMIN: CPT | Performed by: INTERNAL MEDICINE

## 2021-02-25 PROCEDURE — G0123 SCREEN CERV/VAG THIN LAYER: HCPCS | Performed by: PHYSICIAN ASSISTANT

## 2021-02-25 PROCEDURE — 84450 TRANSFERASE (AST) (SGOT): CPT | Performed by: INTERNAL MEDICINE

## 2021-02-25 PROCEDURE — 85027 COMPLETE CBC AUTOMATED: CPT | Performed by: INTERNAL MEDICINE

## 2021-02-25 ASSESSMENT — MIFFLIN-ST. JEOR: SCORE: 1327.95

## 2021-02-25 NOTE — PROGRESS NOTES
"    Assessment & Plan     Cervical cancer screening  Follow up pending results.  - Pap imaged thin layer screen with HPV - recommended age 30 - 65 years (select HPV order below)  - HPV High Risk Types DNA Cervical    Return in about 1 year (around 2/25/2022) for Physical Exam- Wellness.    SEYMOUR Agudelo [facility] KATHE Wall is a 60 year old who presents for the following health issues:    HPI     Patient here today to have pap repeated due to unsatisfactory specimen.  Pap done at her physical in Oct needs to be repeats.  She has not had an abnormal PAP.    Specimen   processed and evaluated, but unsatisfactory for evaluation of epithelial   cell abnormality due to:   -scant cellularity.     She does not have any additional concerns today.    Review of Systems   Constitutional, HEENT, cardiovascular, pulmonary, gi and gu systems are negative, except as otherwise noted.      Objective    /72 (BP Location: Right arm, Patient Position: Sitting, Cuff Size: Adult Regular)   Pulse 74   Temp 98.6  F (37  C) (Oral)   Resp 17   Ht 1.575 m (5' 2\")   Wt 79.5 kg (175 lb 3.2 oz)   LMP 10/13/2014 (Approximate)   SpO2 95%   BMI 32.04 kg/m    Body mass index is 32.04 kg/m .  Physical Exam   GENERAL: healthy, alert and no distress   (female): normal female external genitalia, normal urethral meatus, vaginal mucosa, normal cervix/adnexa/uterus without masses or discharge          "

## 2021-02-26 LAB
ALBUMIN SERPL-MCNC: 3.8 G/DL (ref 3.4–5)
ALT SERPL W P-5'-P-CCNC: 93 U/L (ref 0–50)
AST SERPL W P-5'-P-CCNC: 60 U/L (ref 0–45)
CREAT SERPL-MCNC: 0.82 MG/DL (ref 0.52–1.04)
GFR SERPL CREATININE-BSD FRML MDRD: 78 ML/MIN/{1.73_M2}

## 2021-03-02 ENCOUNTER — COMMUNICATION - HEALTHEAST (OUTPATIENT)
Dept: RHEUMATOLOGY | Facility: CLINIC | Age: 61
End: 2021-03-02

## 2021-03-02 DIAGNOSIS — M05.9 SEROPOSITIVE RHEUMATOID ARTHRITIS (H): ICD-10-CM

## 2021-03-02 DIAGNOSIS — R79.89 ELEVATED LFTS: ICD-10-CM

## 2021-03-02 LAB
COPATH REPORT: NORMAL
PAP: NORMAL

## 2021-03-09 DIAGNOSIS — M05.9 SEROPOSITIVE RHEUMATOID ARTHRITIS (H): Primary | ICD-10-CM

## 2021-03-11 ENCOUNTER — IMMUNIZATION (OUTPATIENT)
Dept: NURSING | Facility: CLINIC | Age: 61
End: 2021-03-11
Payer: COMMERCIAL

## 2021-03-11 PROCEDURE — 91300 PR COVID VAC PFIZER DIL RECON 30 MCG/0.3 ML IM: CPT

## 2021-03-11 PROCEDURE — 0001A PR COVID VAC PFIZER DIL RECON 30 MCG/0.3 ML IM: CPT

## 2021-03-18 ENCOUNTER — OFFICE VISIT - HEALTHEAST (OUTPATIENT)
Dept: RHEUMATOLOGY | Facility: CLINIC | Age: 61
End: 2021-03-18

## 2021-03-18 DIAGNOSIS — M25.532 PAIN IN BOTH WRISTS: ICD-10-CM

## 2021-03-18 DIAGNOSIS — Z79.899 HIGH RISK MEDICATION USE: ICD-10-CM

## 2021-03-18 DIAGNOSIS — M05.9 SEROPOSITIVE RHEUMATOID ARTHRITIS (H): ICD-10-CM

## 2021-03-18 DIAGNOSIS — M25.531 PAIN IN BOTH WRISTS: ICD-10-CM

## 2021-03-18 DIAGNOSIS — R79.89 ELEVATED LFTS: ICD-10-CM

## 2021-04-01 ENCOUNTER — IMMUNIZATION (OUTPATIENT)
Dept: NURSING | Facility: CLINIC | Age: 61
End: 2021-04-01
Attending: PHARMACIST
Payer: COMMERCIAL

## 2021-04-01 PROCEDURE — 0002A PR COVID VAC PFIZER DIL RECON 30 MCG/0.3 ML IM: CPT

## 2021-04-01 PROCEDURE — 91300 PR COVID VAC PFIZER DIL RECON 30 MCG/0.3 ML IM: CPT

## 2021-05-11 ENCOUNTER — E-VISIT (OUTPATIENT)
Dept: FAMILY MEDICINE | Facility: CLINIC | Age: 61
End: 2021-05-11
Payer: COMMERCIAL

## 2021-05-11 DIAGNOSIS — H10.32 ACUTE CONJUNCTIVITIS OF LEFT EYE, UNSPECIFIED ACUTE CONJUNCTIVITIS TYPE: Primary | ICD-10-CM

## 2021-05-11 PROCEDURE — 99421 OL DIG E/M SVC 5-10 MIN: CPT | Performed by: PHYSICIAN ASSISTANT

## 2021-05-11 RX ORDER — CIPROFLOXACIN HYDROCHLORIDE 3.5 MG/ML
1-2 SOLUTION/ DROPS TOPICAL EVERY 4 HOURS
Qty: 5 ML | Refills: 0 | Status: SHIPPED | OUTPATIENT
Start: 2021-05-11 | End: 2021-10-07

## 2021-05-11 NOTE — PATIENT INSTRUCTIONS
Thank you for choosing us for your care. I have placed an order for a prescription so that you can start treatment. View your full visit summary for details by clicking on the link below. Your pharmacist will able to address any questions you may have about the medication.     If you re not feeling better within 2-3 days, please schedule an appointment.  You can schedule an appointment right here in [facility], or call [phone]  If the visit is for the same symptoms as your eVisit, we ll refund the cost of your eVisit if seen within seven days.      Bacterial Conjunctivitis    You have an infection in the membranes covering the white part of the eye. This part of the eye is called the conjunctiva. The infection is called conjunctivitis. The most common symptoms of conjunctivitis include a thick, pus-like discharge from the eye, swollen eyelids, redness, eyelids sticking together upon awakening, and a gritty or scratchy feeling in the eye. Your infection was caused by bacteria. It may be treated with medicine. With treatment, the infection takes about 7 to 10 days to resolve.   Home care    Use prescribed antibiotic eye drops or ointment as directed to treat the infection.    Apply a warm compress (towel soaked in warm water) to the affected eye 3 to 4 times a day. Do this just before applying medicine to the eye.    Use a warm, wet cloth to wipe away crusting of the eyelids in the morning. This is caused by mucus drainage during the night. You may also use saline irrigating solution or artificial tears to rinse away mucus in the eye. Do not put a patch over the eye.    Wash your hands before and after touching the infected eye. This is to prevent spreading the infection to the other eye, and to other people. Don't share your towels or washcloths with others.    You may use acetaminophen or ibuprofen to control pain, unless another medicine was prescribed. Talk with your healthcare provider before using these  medicines if you have chronic liver or kidney disease. Also talk with your provider if you have ever had a stomach ulcer or digestive bleeding.    Don't wear contact lenses until your eyes have healed and all symptoms are gone.    Follow-up care  Follow up with your healthcare provider, or as advised.  When to seek medical advice  Call your healthcare provider right away if any of these occur:    Worsening vision    Increasing pain in the eye    Increasing swelling or redness of the eyelid    Redness spreading around the eye  [employer] last reviewed this educational content on 4/1/2020 2000-2021 [employer]. All rights reserved. This information is not intended as a substitute for professional medical care. Always follow your healthcare professional's instructions.

## 2021-05-26 VITALS — HEART RATE: 80 BPM | DIASTOLIC BLOOD PRESSURE: 84 MMHG | SYSTOLIC BLOOD PRESSURE: 124 MMHG

## 2021-06-01 ENCOUNTER — COMMUNICATION - HEALTHEAST (OUTPATIENT)
Dept: RHEUMATOLOGY | Facility: CLINIC | Age: 61
End: 2021-06-01

## 2021-06-01 DIAGNOSIS — M05.9 SEROPOSITIVE RHEUMATOID ARTHRITIS (H): Primary | ICD-10-CM

## 2021-06-01 DIAGNOSIS — M05.9 SEROPOSITIVE RHEUMATOID ARTHRITIS (H): ICD-10-CM

## 2021-06-01 DIAGNOSIS — Z79.899 HISTORY OF LONG-TERM TREATMENT WITH HIGH-RISK MEDICATION: ICD-10-CM

## 2021-06-03 ENCOUNTER — COMMUNICATION - HEALTHEAST (OUTPATIENT)
Dept: ADMINISTRATIVE | Facility: CLINIC | Age: 61
End: 2021-06-03

## 2021-06-04 DIAGNOSIS — Z79.899 HISTORY OF LONG-TERM TREATMENT WITH HIGH-RISK MEDICATION: ICD-10-CM

## 2021-06-04 DIAGNOSIS — M05.9 SEROPOSITIVE RHEUMATOID ARTHRITIS (H): ICD-10-CM

## 2021-06-04 LAB
ERYTHROCYTE [DISTWIDTH] IN BLOOD BY AUTOMATED COUNT: 13.6 % (ref 10–15)
HCT VFR BLD AUTO: 34.8 % (ref 35–47)
HGB BLD-MCNC: 11.3 G/DL (ref 11.7–15.7)
MCH RBC QN AUTO: 29.8 PG (ref 26.5–33)
MCHC RBC AUTO-ENTMCNC: 32.5 G/DL (ref 31.5–36.5)
MCV RBC AUTO: 92 FL (ref 78–100)
PLATELET # BLD AUTO: 358 10E9/L (ref 150–450)
RBC # BLD AUTO: 3.79 10E12/L (ref 3.8–5.2)
WBC # BLD AUTO: 6 10E9/L (ref 4–11)

## 2021-06-04 PROCEDURE — 84450 TRANSFERASE (AST) (SGOT): CPT | Performed by: INTERNAL MEDICINE

## 2021-06-04 PROCEDURE — 85027 COMPLETE CBC AUTOMATED: CPT | Performed by: INTERNAL MEDICINE

## 2021-06-04 PROCEDURE — 82565 ASSAY OF CREATININE: CPT | Performed by: INTERNAL MEDICINE

## 2021-06-04 PROCEDURE — 84460 ALANINE AMINO (ALT) (SGPT): CPT | Performed by: INTERNAL MEDICINE

## 2021-06-04 PROCEDURE — 36415 COLL VENOUS BLD VENIPUNCTURE: CPT | Performed by: INTERNAL MEDICINE

## 2021-06-04 PROCEDURE — 82040 ASSAY OF SERUM ALBUMIN: CPT | Performed by: INTERNAL MEDICINE

## 2021-06-04 NOTE — PATIENT INSTRUCTIONS - HE
Summary of Your Rheumatology Visit    Next Appointment:  3 Months    Medications:    Continue medications provided, as discussed.      Referrals:      Tests:     Please have labs performed today and within few days (or on day) of next visit.       Injections:        Other:    Wrist splints are to be worn with activities during the day that reproduce or worsen pains.

## 2021-06-04 NOTE — PROGRESS NOTES
Mae Connors who presents today with a chief complaint of  Consult      Joint Pains: Yes  Location: wrists, fingers  Onset: Chronic  Intensity: 3 /10  AM Stiffness: 0 Minutes  Alleviating/Aggravating Factors: Medications helpful?  Tolerating Meds: Yes  Other:      ROS:  Patient denies having: persistent dry eyes, dry mouth, recurrent oral ulcers, patchy alopecia, active rashes, photosensitivity, history of psoriasis, active chest pain, active shortness of breath, active cough, active dysuria, history of kidney stones, active abdominal pain, active diarrhea, history of hematochezia, active dysphagia, history of peptic ulcer disease, history of HIV, tuberculosis, hepatitis B or C, Lyme disease, seizure history, raynaud's, active documented fevers, recent infections, difficulty sleeping or chronic unrefreshing sleep, involuntary weight loss, loss of appetite, excessive fatigue, depression, anxiety,  numbness and tingling [if yes, where], recurrent sinus infections, history of inflammatory eye diseases (such as uveitis, scleritis, iritis, etc).       Information gathered by medical assistant incorporated into this note, was reviewed and discussed with the patient.    Problem List:  There is no problem list on file for this patient.       PMH:   No past medical history on file.    Surgical History:  No past surgical history on file.    Family History:  No family history on file.    Social History:       Allergies:  Allergies   Allergen Reactions     Sulfa (Sulfonamide Antibiotics)      Hives and trouble breathing        Current Medications:  Current Outpatient Medications   Medication Sig Dispense Refill     B-complex with vitamin C (TOTAL B/C) tablet Take 1 tablet by mouth.       buPROPion (WELLBUTRIN XL) 150 MG 24 hr tablet TK 1 T PO QAM  3     cetirizine (ZYRTEC) 10 MG tablet Take 10 mg by mouth daily.        folic acid (FOLVITE) 800 MCG tablet Take 400 mcg by mouth daily.       gluc/chondr-msm 7/C/shankar/boron  (GLUCOSAMINE-CHONDR, BOSWELLIA, ORAL) Take 1,500 mg by mouth daily.       hydroxychloroquine (PLAQUENIL) 200 mg tablet Take 200 mg by mouth 2 (two) times a day.  2     levothyroxine (SYNTHROID, LEVOTHROID) 75 MCG tablet Take 75 mcg by mouth daily.  2     methotrexate 2.5 MG tablet TAKE 10 TABLETS BY MOUTH ONCE WEEKLY       No current facility-administered medications for this visit.            Physical Exam:  /84 (Patient Site: Right Arm, Patient Position: Sitting, Cuff Size: Adult Regular)   Pulse 80   General: A & O x 3 in NAD  HEENT: EOMI, Non injected/non icteric sclera, no oral lesions noted  Neck: Supple, no cervical LAD or thyromegaly noted  Derm: No malar rash, psoriatic lesions or nail pitting appreciated  CV: s1s2 with RRR, no rubs appreciated   Lungs: CTA B/L, no wheezing , rales or rhonci appreciated  GI: Soft, NT/ND, no rebound, no guarding noted, no hepatomegally appreciated  MS: Positive fullness with tenderness noted involving left wrist also has some tenderness involving distal left forearm on ulnar side, on palpation.  Otherwise patient demonstrated good passive/active ROM over other joints with no warmth, erythema, tenderness or synovitis noted over these joints.  Back: negative straight leg raising  Neuro: 5/5 strength in upper and lower extremities b/l, good sensation b/l,  1-2+ bicep and patella reflexes b/l      Summary/Assessment:    Was a patient of Dr. Donohue [facility], last seen June 2019.    Pleasant 59-year-old female with pertinent past medical history of a seropositive rheumatoid arthritis, presents to become established with another rheumatologist.    Patient states that she was first diagnosed with rheumatoid arthritis about 3 years ago however believes had symptoms to a milder extent over the past 5 to 10 years.  Diagnosed with rheumatoid arthritis by Dr. Reyna.    States that joints typically involved when her RA is active are wrists and fingers.  On one occasion  had foot involvement as well.    Has been on methotrexate for a few years.  Over the past year Plaquenil was added, prednisone was tapered down/discontinued in the process.    Combination of methotrexate and Plaquenil has been working well until this past week when she began experiencing left wrist pain.  On exam some synovitis noted involving left wrist, may also have component of left distal forearm/wrist tendinitis.  Patient describes being overactive over holiday weekend in preparing meals which triggered her symptoms.    Patient also held methotrexate for 2 weeks due to sinus infection, restarted about 2 weeks ago.  States that sinus infection has cleared with antibiotics.    Patient admits to having recurrent sinus infections about once a year.    Patient currently wearing a left wrist splint.    Claims to have seen ophthalmology about 2 months ago and had unremarkable visual field testing.    Please see below for management plan.      Pertinent rheumatology/past medical history (please refer to above for more detailed history):      Seropositive rheumatoid arthritis (DX 2016, typically involving hands and wrists)    High risk medication use    History of right carpal tunnel syndrome    History ehrlichiosis    Prior history of transaminitis    Depression (established with a counselor)    Hypothyroid      Rheumatology medications provided/suggested:    Methotrexate  Plaquenil  Folic acid       Pertinent medication from other providers or from otc (please refer to above for more detailed med list):    Glucosamine/chondroitin        Pertinent medications already tried:     Prednisone (helpful)      Pertinent lab history:    2016, noted to have positive/elevated: Rheumatoid factor, CCP antibody    2016, noted to have negative/unremarkable: CHINMAY    Pertinent imaging/test history:          Other:    , has 4 children.  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    Denies regular alcohol beverage intake  or tobacco use.    Standing order for labs placed every 2-3 months good through December 2020..        Plan:    For active inflammatory arthritis will provide prednisone 15 mg daily decreasing to off over the course of 1 month.    Recommended that she continue wearing left wrist splint with activities that reproduce or exacerbate left wrist pains.    Continue methotrexate 10 tablets weekly.    Continue folic acid, takes 800 mcg over-the-counter daily.    We will obtain some labs today and correlate clinically.    Recheck labs prior to or on day of next visit.    Follow-up in 3 months.      Procedure note:       Spent 60 minutes with greater than half of this time spent with the patient going over differential diagnosis, prognosis, treatment plan, medication side effects and  answering questions.    Major side effect profile of medications provided/suggested were discussed with the patient.    This note was transcribed using Dragon voice recognition software as a result unintentional grammatical errors or word substitutions may have occurred. Please contact our Rheumatology department if you need any clarification or if you have any related inquiries.    Thank you for referring this patient to our clinic.    Robert Cotton DO       ....................  12/5/2019   3:11 PM

## 2021-06-05 LAB
ALBUMIN SERPL-MCNC: 3.7 G/DL (ref 3.4–5)
ALT SERPL W P-5'-P-CCNC: 26 U/L (ref 0–50)
AST SERPL W P-5'-P-CCNC: 24 U/L (ref 0–45)
CREAT SERPL-MCNC: 0.91 MG/DL (ref 0.52–1.04)
GFR SERPL CREATININE-BSD FRML MDRD: 69 ML/MIN/{1.73_M2}

## 2021-06-07 NOTE — TELEPHONE ENCOUNTER
Already called and spoke with pt the other day and msg already relayed to pt. F/u appt was already scheduled and per pt will do lab with [facility].     Called pt again today to informed pt about the TSH level and provider recommendation below. Pt understood and will do so. No further question.

## 2021-06-07 NOTE — PATIENT INSTRUCTIONS - HE
Summary of Your Rheumatology Visit    Next Appointment:   4 Months    Medications:    Decrease methotrexate to 6 tablets weekly.  Hold dose if develops any signs/symptoms of infection.    Referrals:      Tests:     Recheck AST/ALT/albumin in about 4 weeks.  If performed at [facility], after having labs performed, recommend contacting us so we can retrieve results.    Recheck standing order labs again in about 12 weeks.     Injections:        Other:

## 2021-06-07 NOTE — TELEPHONE ENCOUNTER
Please schedule patient for follow-up appointment in 4 months.  Repeat liver enzymes in about 4 weeks.  Standing order labs in about 12 weeks.    Noted to have elevated TSH (thyroid stimulating hormone) with normal T4, recommend patient follow-up with her PCP.

## 2021-06-07 NOTE — PROGRESS NOTES
Mae Connors who presents today with a chief complaint of  Follow-up      Joint Pains: Yes  Location: LT wrist   Onset: chronic 4 years   Intensity:  1-2 /10  AM Stiffness: yes, 30 Minutes to 1 hour  Alleviating/Aggravating Factors: yes Medications helpful  Tolerating Meds: Yes tolerate med.   Other:  Want to discuss about decreasing plaquenil or discontinue it.     ROS:  Patient denies having any active: chest pain, shortness of breath, cough, abdominal pain, nausea, vomiting, rashes, documented fevers, oral ulcers and recent infections.+Patient admits to having a good appetite.  Information gathered by medical assistant incorporated into this note, was reviewed and discussed with the patient.    Problem List:  There is no problem list on file for this patient.       PMH:   No past medical history on file.    Surgical History:  No past surgical history on file.    Family History:  No family history on file.    Social History:       Allergies:  Allergies   Allergen Reactions     Sulfa (Sulfonamide Antibiotics)      Hives and trouble breathing        Current Medications:  Current Outpatient Medications   Medication Sig Dispense Refill     B-complex with vitamin C (TOTAL B/C) tablet Take 1 tablet by mouth.       buPROPion (WELLBUTRIN XL) 150 MG 24 hr tablet TK 1 T PO QAM  3     cetirizine (ZYRTEC) 10 MG tablet Take 10 mg by mouth daily.        folic acid (FOLVITE) 800 MCG tablet Take 800 mcg by mouth daily.        gluc/chondr-msm 7/C/shankar/boron (GLUCOSAMINE-CHONDR, BOSWELLIA, ORAL) Take 1,500 mg by mouth daily.       hydroxychloroquine (PLAQUENIL) 200 mg tablet Take 1 tablet (200 mg total) by mouth 2 (two) times a day. 180 tablet 1     levothyroxine (SYNTHROID, LEVOTHROID) 75 MCG tablet Take 75 mcg by mouth daily.  2     methotrexate 2.5 MG tablet TAKE 10 TABLETS BY MOUTH ONCE WEEKLY 130 tablet 0     UNABLE TO FIND Med Name: tumuric complex- take 500 mg two times a day       predniSONE (DELTASONE) 5 MG tablet Take  3 tab p.o. qd for 7 days then 2 tab qd for 7 days then 1 tab qd for 7 days then 1/2 tab qd for 7 days to off.. 46 tablet 0     No current facility-administered medications for this visit.            Physical Exam:  There were no vitals taken for this visit.  Following up today via phone visit, per Covid-19 pandemic requirements.    Verbal consent has been obtained for this service by a care team member.      Summary/Assessment:    History that includes seropositive rheumatoid arthritis.    States has been doing well except for some resurfacing left wrist pain which she believes is more so related to tendinitis, as wrist splint was beneficial.  Patient also completed a prednisone taper.    Has been compliant with methotrexate 8 tablets weekly.  Desires to try discontinuing Plaquenil as it states was added to methotrexate due to left wrist pain.    Noted to have elevated liver enzymes on labs performed yesterday, patient admits to having a few drinks the night prior due to [employer] [place], perhaps related.  Patient also had elevated liver enzymes in the past, unclear etiology.    Denies any chest pain, shortness of breath, cough, fevers or URI-like symptoms.  Is taking extra sanitary precautions with regards to coronavirus pandemic..    Please see below for management plan.      From prior note: Was a patient of Dr. Donohue [facility], last seen June 2019.    Pleasant 59-year-old female with pertinent past medical history of a seropositive rheumatoid arthritis, presents to become established with another rheumatologist.    Patient states that she was first diagnosed with rheumatoid arthritis about 3 years ago however believes had symptoms to a milder extent over the past 5 to 10 years.  Diagnosed with rheumatoid arthritis by Dr. Reyna.    States that joints typically involved when her RA is active are wrists and fingers.  On one occasion had foot involvement as well.    Has been on methotrexate for a few  years.  Over the past year Plaquenil was added, prednisone was tapered down/discontinued in the process.    Combination of methotrexate and Plaquenil has been working well until this past week when she began experiencing left wrist pain.  On exam some synovitis noted involving left wrist, may also have component of left distal forearm/wrist tendinitis.  Patient describes being overactive over holiday weekend in preparing meals which triggered her symptoms.      Pertinent rheumatology/past medical history (please refer to above for more detailed history):      Seropositive rheumatoid arthritis (DX 2016, typically involving hands and wrists)    High risk medication use    History of right carpal tunnel syndrome    History ehrlichiosis    Episodes of transaminitis    Depression (established with a counselor)    Hypothyroid      Rheumatology medications provided/suggested:    Methotrexate  Folic acid       Pertinent medication from other providers or from otc (please refer to above for more detailed med list):    Glucosamine/chondroitin        Pertinent medications already tried:     Prednisone taper (helpful)      Pertinent lab history:    2016, noted to have positive/elevated: Rheumatoid factor, CCP antibody    2016, noted to have negative/unremarkable: CHINMAY    Pertinent imaging/test history:          Other:    , has 4 children.  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    Denies regular alcohol beverage intake or tobacco use.    Standing order for labs placed every 2-3 months good through December 2020.      Plan:        Recommended that she continue wearing left wrist splint with activities that reproduce or exacerbate left wrist pains.    Given elevated liver enzymes, recommend decreasing methotrexate to 6 tablets weekly and recheck levels about 4 weeks thereafter.  Avoid alcohol beverages.  Made aware if comes down with any signs and symptoms with possible infection particularly symptoms consistent  with having URI, should hold methotrexate dose and seek medical attention if symptoms progress.    Continue folic acid, takes 800 mcg over-the-counter daily, except the day when taking methotrexate.      Regarding patient's desire to taper to off Plaquenil due to no noticeable benefit.  Suggested holdingfor at least another month, as apparently Plaquenil may have some protective quality against Covid-19.  States has sufficient supply.    Recheck labs again prior to next visit.    Follow-up in 4 months.      Procedure note:     Spent 22 minutes over phone visit with patient discussing patient's concerns, labs/test results and plan.    This note was transcribed using Dragon voice recognition software as a result unintentional grammatical errors or word substitutions may have occurred. Please contact our Rheumatology department if you need any clarification or if you have any related inquiries.    Major side effect profile of medications provided were discussed with the patient.      Robert Cotton DO     ....................  3/19/2020   2:06 PM

## 2021-06-09 NOTE — PATIENT INSTRUCTIONS - HE
Summary of Your Rheumatology Visit    Next Appointment: 4 Months    Medications:    Continue medications provided, as discussed.       Referrals:      Tests:     Please have labs performed in about 10-11 weeks.      Injections:        Other:

## 2021-06-09 NOTE — PROGRESS NOTES
Mae Connors who presents today with a chief complaint of  Follow Up      Joint Pains: yes  Location: bilateral wrists  Onset: chronic years  Intensity:  2-5/10  AM Stiffness: n/a  Alleviating/Aggravating Factors: lift, cooking cutting worsens. Med and wearing wrist braces helps  Tolerating Meds: yes  Other:       ROS:  Patient denies having any chest pain, shortness of breath, cough, abdominal pain, nausea, vomiting, rashes, fevers, oral ulcers and recent infections.  Patient admits to having a good appetite      Problem List:  There is no problem list on file for this patient.       PMH:   No past medical history on file.    Surgical History:  No past surgical history on file.    Family History:  No family history on file.    Social History:       Allergies:  Allergies   Allergen Reactions     Sulfa (Sulfonamide Antibiotics)      Hives and trouble breathing        Current Medications:  Current Outpatient Medications   Medication Sig Dispense Refill     B-complex with vitamin C (TOTAL B/C) tablet Take 1 tablet by mouth.       buPROPion (WELLBUTRIN XL) 150 MG 24 hr tablet TK 1 T PO QAM  3     cetirizine (ZYRTEC) 10 MG tablet Take 10 mg by mouth daily.        folic acid (FOLVITE) 800 MCG tablet Take 800 mcg by mouth daily.        gluc/chondr-msm 7/C/shankar/boron (GLUCOSAMINE-CHONDR, BOSWELLIA, ORAL) Take 1,500 mg by mouth daily.       hydroxychloroquine (PLAQUENIL) 200 mg tablet Take 1 tablet (200 mg total) by mouth 2 (two) times a day. 180 tablet 1     levothyroxine (SYNTHROID, LEVOTHROID) 75 MCG tablet Take 75 mcg by mouth daily.  2     methotrexate 2.5 MG tablet TAKE 10 TABLETS BY MOUTH ONCE WEEKLY 130 tablet 0     No current facility-administered medications for this visit.            Physical Exam:  Following up today via video visit, per Covid-19 pandemic requirements.    Verbal consent has been obtained for this service by a care team member.     Following up today via video visit, per Covid-19 pandemic  requirements.    Verbal consent has been obtained for this service by provider.    Video call start time: 8:34 AM    Video call end time: 8:44 AM    [employer] utilized for video call.    Patient location for video visit: Home     Provider location for video visit:  Home (working remotely)    Summary/Assessment:    History that includes seropositive rheumatoid arthritis.    Claims to be doing well at this time.    On methotrexate 6 tablets weekly and Plaquenil 200 mg twice a day.    Occasional wrist pains are mild and tolerable, has wrist splints.    Repeat liver enzymes have normalized.      Please see below for management plan.      From prior note: Was a patient of Dr. Donohue [facility], last seen June 2019.    Pleasant 59-year-old female with pertinent past medical history of a seropositive rheumatoid arthritis, presents to become established with another rheumatologist.    Patient states that she was first diagnosed with rheumatoid arthritis about 3 years ago however believes had symptoms to a milder extent over the past 5 to 10 years.  Diagnosed with rheumatoid arthritis by Dr. Reyna.    States that joints typically involved when her RA is active are wrists and fingers.  On one occasion had foot involvement as well.    Has been on methotrexate for a few years.  Over the past year Plaquenil was added, prednisone was tapered down/discontinued in the process.    Combination of methotrexate and Plaquenil has been working well until this past week when she began experiencing left wrist pain.  On exam some synovitis noted involving left wrist, may also have component of left distal forearm/wrist tendinitis.  Patient describes being overactive over holiday weekend in preparing meals which triggered her symptoms.      Pertinent rheumatology/past medical history (please refer to above for more detailed history):      Seropositive rheumatoid arthritis (DX 2016, typically involving hands and wrists)    High  risk medication use    History of right carpal tunnel syndrome    History ehrlichiosis    Episodes of transaminitis    Depression (established with a counselor)    Hypothyroid      Rheumatology medications provided/suggested:    Methotrexate  Folic acid       Pertinent medication from other providers or from otc (please refer to above for more detailed med list):    Glucosamine/chondroitin        Pertinent medications already tried:     Prednisone taper (helpful)      Pertinent lab history:    2016, noted to have positive/elevated: Rheumatoid factor, CCP antibody    2016, noted to have negative/unremarkable: CHINMAY    Pertinent imaging/test history:          Other:    , has 4 children.  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    Denies regular alcohol beverage intake or tobacco use.    Standing order for labs placed every 2-3 months good through December 2020.      Plan:      Continue methotrexate 6 tablets weekly.  Continue avoiding alcoholic beverages.    Continue folic acid, takes 800 mcg over-the-counter daily, except the day when taking methotrexate.      Continue Plaquenil 200 mg twice a day for now.  When COVID 19 pandemic subsides may desire to taper off Plaquenil, for now desires to stay on.      Recheck labs in 10 to 11 weeks.    Follow-up in 4 months.      Procedure note:     This note was transcribed using Dragon voice recognition software as a result unintentional grammatical errors or word substitutions may have occurred. Please contact our Rheumatology department if you need any clarification or if you have any related inquiries.    Major side effect profile of medications provided were discussed with the patient.        Robert Cotton DO     ....................  7/23/2020   8:18 AM

## 2021-06-12 NOTE — TELEPHONE ENCOUNTER
----- Message from Dilia Beck RN sent at 10/16/2020  8:07 AM CDT -----  Please call pt to schedule lab only appt now for med refills for Dr Cotton

## 2021-06-12 NOTE — TELEPHONE ENCOUNTER
10.16 - called x 1 - pt notified that she needs labs for further refills.      Please send orders to: [facility], phone: [phone]    Patient notified that we will be sending orders to their recommended location. Reminded patient that they are responsible to schedule own appointments and to wait 1 - 2 days to allow our staff enough time for faxing.

## 2021-06-13 NOTE — PROGRESS NOTES
Mae Connors who presents today with a chief complaint of  No chief complaint on file.      Joint Pains: yes  Location: wrists and fingers  Onset: chronic  Intensity:  3-4/10  AM Stiffness: Minutes  Alleviating/Aggravating Factors: use hand braces and Voltaren gel help with pain  Tolerating Meds:yes  Other:       ROS:  Patient denies having any chest pain, shortness of breath, cough, abdominal pain, nausea, vomiting, rashes, fevers, oral ulcers and recent infections.  Patient admits to having a good appetite      Problem List:  There is no problem list on file for this patient.       PMH:   No past medical history on file.    Surgical History:  No past surgical history on file.    Family History:  No family history on file.    Social History:       Allergies:  Allergies   Allergen Reactions     Sulfa (Sulfonamide Antibiotics)      Hives and trouble breathing        Current Medications:  Current Outpatient Medications   Medication Sig Dispense Refill     B-complex with vitamin C (TOTAL B/C) tablet Take 1 tablet by mouth.       buPROPion (WELLBUTRIN XL) 150 MG 24 hr tablet TK 1 T PO QAM  3     cetirizine (ZYRTEC) 10 MG tablet Take 10 mg by mouth daily.        folic acid (FOLVITE) 800 MCG tablet Take 800 mcg by mouth daily.        gluc/chondr-msm 7/C/shankar/boron (GLUCOSAMINE-CHONDR, BOSWELLIA, ORAL) Take 1,500 mg by mouth daily.       hydrOXYchloroQUINE (PLAQUENIL) 200 mg tablet Take 1 tablet (200 mg total) by mouth 2 (two) times a day. 60 tablet 0     levothyroxine (SYNTHROID, LEVOTHROID) 75 MCG tablet Take 75 mcg by mouth daily.  2     methotrexate 2.5 MG tablet TAKE 6 TABLETS BY MOUTH ONCE WEEKLY 72 tablet 0     No current facility-administered medications for this visit.            Physical Exam:  Following up today via video visit, per Covid-19 pandemic requirements.    Verbal consent has been obtained for this service by care team member.    Video call start time: 10:21 AM    Video call end time:  10:29  [employer] utilized for video call.    Patient location for video visit: Home     Provider location for video visit:  Home (working remotely)      Summary/Assessment:    History that includes seropositive rheumatoid arthritis.    Claims to be doing well at this time.    On methotrexate 6 tablets weekly and Plaquenil 200 mg twice a day.    Claims have seen ophthalmologist a few months ago and told that visual field testing was fine    Occasional wrist pains are mild and tolerable, has wrist splints and utilizes Voltaren gel as needed.    Latest drug monitoring labs noted to be stable.    Please see below for management plan.      From prior note: Was a patient of Dr. Donohue [facility], last seen June 2019.    Pleasant 59-year-old female with pertinent past medical history of a seropositive rheumatoid arthritis, presents to become established with another rheumatologist.    Patient states that she was first diagnosed with rheumatoid arthritis about 3 years ago however believes had symptoms to a milder extent over the past 5 to 10 years.  Diagnosed with rheumatoid arthritis by Dr. Reyna.    States that joints typically involved when her RA is active are wrists and fingers.  On one occasion had foot involvement as well.    Has been on methotrexate for a few years.  Over the past year Plaquenil was added, prednisone was tapered down/discontinued in the process.    Combination of methotrexate and Plaquenil has been working well until this past week when she began experiencing left wrist pain.  On exam some synovitis noted involving left wrist, may also have component of left distal forearm/wrist tendinitis.  Patient describes being overactive over holiday weekend in preparing meals which triggered her symptoms.      Pertinent rheumatology/past medical history (please refer to above for more detailed history):      Seropositive rheumatoid arthritis (DX 2016, typically involving hands and wrists)    High risk  medication use    History of right carpal tunnel syndrome    History ehrlichiosis    Episodes of transaminitis    Depression (established with a counselor)    Hypothyroid      Rheumatology medications provided/suggested:    Methotrexate  Folic acid       Pertinent medication from other providers or from otc (please refer to above for more detailed med list):    Glucosamine/chondroitin  Diclofenac gel      Pertinent medications already tried:     Prednisone taper (helpful)      Pertinent lab history:    2016, noted to have positive/elevated: Rheumatoid factor, CCP antibody    2016, noted to have negative/unremarkable: [facility]    Pertinent imaging/test history:          Other:    , has 4 children.  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    Denies regular alcohol beverage intake or tobacco use.    Standing order for labs placed every 2-3 months good through November 2021.      Plan:      Continue methotrexate 6 tablets weekly.  Continue avoiding alcoholic beverages.    Continue folic acid, takes 800 mcg over-the-counter daily, except the day when taking methotrexate.      Continue Plaquenil 200 mg twice a day for now.  When COVID 19 pandemic subsides may desire to taper off Plaquenil, for now desires to stay on.      For occasional mild wrist pains, continue Voltaren gel as needed and/or wrist splint as needed.    Recheck labs in 8 weeks.    Follow-up in 4 months.        This note was transcribed using Dragon voice recognition software as a result unintentional grammatical errors or word substitutions may have occurred. Please contact our Rheumatology department if you need any clarification or if you have any related inquiries.    Major side effect profile of medications provided were discussed with the patient.      Robert Cotton DO    ....................  11/17/2020   8:35 AM

## 2021-06-13 NOTE — PATIENT INSTRUCTIONS - HE
Summary of Your Rheumatology Visit    Next Appointment:  4 Months    Medications:    Please follow directives on pill bottle on how to take medication(s) provided.    Referrals:      Tests:     Please have labs performed in about 8 weeks.    If having labs performed at a [facility] facility then patient should notify us soon thereafter, so we can actively retrieve results from [facility] system.  Therefore if feasible recommendable to try having labs performed at a [facility], as labs performed from [facility] side are automatically resulted into our in-basket system.         Injections:        Other:

## 2021-06-15 ENCOUNTER — COMMUNICATION - HEALTHEAST (OUTPATIENT)
Dept: RHEUMATOLOGY | Facility: CLINIC | Age: 61
End: 2021-06-15

## 2021-06-15 DIAGNOSIS — M05.9 SEROPOSITIVE RHEUMATOID ARTHRITIS (H): ICD-10-CM

## 2021-06-15 NOTE — TELEPHONE ENCOUNTER
Please mention to patient that AST/ALT liver enzymes performed at [place] were elevated.    CBC/cell count results were stable.    Normal kidney function.    Please inquire if patient started any new medications since October 2020?    Recommend decreasing methotrexate from 6 tablets weekly to 3 tablets weekly and checking liver/hepatic panel  4 weeks thereafter.    Avoid Tylenol and over-the-counter anti-inflammatory medications such as ibuprofen/Advil or Aleve etc. for now.    Avoid alcoholic beverages.    Recommend maintaining upcoming follow-up video visit on March 18, 2021 to reassess.    Please place lab orders mentioned above.

## 2021-06-15 NOTE — TELEPHONE ENCOUNTER
Pt notified of lab results and comments. Pt will reduce methotrexate to 3 tabs weekly and have labs rechecked in 4 weeks. Pt confirmed appt on 3/18    Lab order faxed to [facility]

## 2021-06-16 NOTE — PROGRESS NOTES
Mae Connors who presents today with a chief complaint of  No chief complaint on file.      Joint Pains: yes  Location: wrists, fingers, and rt foot  Onset: recent  Intensity:  2/10  AM Stiffness: 60 Minutes  Alleviating/Aggravating Factors: hot bath and heat help  Tolerating Meds: yes  Other: pt got 1st covid vaccine a week ago      ROS:  Patient denies having any chest pain, shortness of breath, cough, abdominal pain, nausea, vomiting, rashes + (1 1/2 week ago), fevers, oral ulcers and recent infections.  Patient admits to having a good appetite      Problem List:  There is no problem list on file for this patient.       PMH:   No past medical history on file.    Surgical History:  No past surgical history on file.    Family History:  No family history on file.    Social History:       Allergies:  Allergies   Allergen Reactions     Sulfa (Sulfonamide Antibiotics)      Hives and trouble breathing        Current Medications:  Current Outpatient Medications   Medication Sig Dispense Refill     B-complex with vitamin C (TOTAL B/C) tablet Take 1 tablet by mouth.       buPROPion (WELLBUTRIN XL) 150 MG 24 hr tablet TK 1 T PO QAM  3     cetirizine (ZYRTEC) 10 MG tablet Take 10 mg by mouth daily.        folic acid (FOLVITE) 800 MCG tablet Take 800 mcg by mouth daily.        gluc/chondr-msm 7/C/shankar/boron (GLUCOSAMINE-CHONDR, BOSWELLIA, ORAL) Take 1,500 mg by mouth daily.       hydrOXYchloroQUINE (PLAQUENIL) 200 mg tablet Take 1 tablet (200 mg total) by mouth 2 (two) times a day. 180 tablet 1     levothyroxine (SYNTHROID, LEVOTHROID) 75 MCG tablet Take 75 mcg by mouth daily.  2     methotrexate 2.5 MG tablet TAKE 3 TABLETS BY MOUTH ONCE WEEKLY 36 tablet 0     No current facility-administered medications for this visit.    Following up today via video visit, per Covid-19 pandemic requirements.    Verbal consent has been obtained for this service by care team member.    Video call start time: 8:41 AM    Video call end  time: 8:55 AM    [employer] utilized for video call.    Patient location for video visit: Home     Provider location for video visit:  Home (working remotely)    Summary/Assessment:    History that includes seropositive rheumatoid arthritis.    Overall claims to be doing well at this time.  Just has occasional wrist pains when performing downward dog pose in yoga.  For occasional wrist pains, has wrist splints and utilizes Voltaren gel as needed.    Noted to have elevated liver enzymes, denies prior alcohol beverage intake or adding new medications.  Denies taking Tylenol or NSAIDs frequently, just occasional Tylenol intake/few times per year.    Patient had elevated liver enzymes in March 2020 however this may have been secondary to some drinking, just before [employer] [place] Day.  Methotrexate was then decrease to 8 tablets weekly to 6 tablets weekly which she has been on since then up until 2 weeks ago, when advised to decrease to 3 tablets weekly.    States is 5.2 and weighs about 170 pounds, perhaps has a component of fatty liver contributing if remains elevated.    Claims compliance with Plaquenil 200 mg twice a day.    Sees ophthalmology every summer.    Latest drug monitoring labs noted to be stable.    Please see below for management plan.      From prior note: Was a patient of Dr. Donohue [facility], last seen June 2019.    Pleasant 59-year-old female with pertinent past medical history of a seropositive rheumatoid arthritis, presents to become established with another rheumatologist.    Patient states that she was first diagnosed with rheumatoid arthritis about 3 years ago however believes had symptoms to a milder extent over the past 5 to 10 years.  Diagnosed with rheumatoid arthritis by Dr. Reyna.    States that joints typically involved when her RA is active are wrists and fingers.  On one occasion had foot involvement as well.    Has been on methotrexate for a few years.  Over the past year  Plaquenil was added, prednisone was tapered down/discontinued in the process.    Combination of methotrexate and Plaquenil has been working well until this past week when she began experiencing left wrist pain.  On exam some synovitis noted involving left wrist, may also have component of left distal forearm/wrist tendinitis.  Patient describes being overactive over holiday weekend in preparing meals which triggered her symptoms.      Pertinent rheumatology/past medical history (please refer to above for more detailed history):      Seropositive rheumatoid arthritis (DX 2016, typically involving hands and wrists)    High risk medication use    History of right carpal tunnel syndrome    History ehrlichiosis    Episodes of transaminitis    Depression (established with a counselor)    Hypothyroid      Rheumatology medications provided/suggested:    Methotrexate  Folic acid       Pertinent medication from other providers or from otc (please refer to above for more detailed med list):    Glucosamine/chondroitin  Diclofenac gel      Pertinent medications already tried:     Prednisone taper (helpful)      Pertinent lab history:    2016, noted to have positive/elevated: Rheumatoid factor, CCP antibody    2016, noted to have negative/unremarkable: [facility]    Pertinent imaging/test history:        Other:    , has 4 children.  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    Denies regular alcohol beverage intake or tobacco use.    Standing order for labs placed every 2-3 months good through November 2021.    5 foot 2 and weighs about 170 pounds.      Plan:      Continue methotrexate 3 tablets weekly (decreased from 6 tablets due to elevated liver enzymes).  Continue avoiding alcoholic beverages.    Continue folic acid, takes 800 mcg over-the-counter daily, except the day when taking methotrexate.      Continue Plaquenil 200 mg twice a day for now.      For occasional mild wrist pains with downward dog yoga pose,  recommended if possible modifying this yoga pose and to listen to her body when experiencing pain, should avoid pushing through pain.  Can also continue Voltaren gel as needed and/or wrist splint as needed.    Recheck liver enzymes in about 2-3 weeks and standing order labs in about 9 weeks.    Follow-up in 4 months.          This note was transcribed using Dragon voice recognition software as a result unintentional grammatical errors or word substitutions may have occurred. Please contact our Rheumatology department if you need any clarification or if you have any related inquiries.    Major side effect profile of medications provided were discussed with the patient.      Robert Cotton DO  ....................  3/18/2021   8:10 AM

## 2021-06-16 NOTE — PATIENT INSTRUCTIONS - HE
Summary of Your Rheumatology Visit    Next Appointment:   4 Months    Medications:    Continue methotrexate 3 tablets weekly for now.    Continue Plaquenil 200 mg twice a day for now.    Referrals:      Tests:     Recheck liver enzymes in about 3 weeks.    Recheck standing order labs in about 9 weeks     Injections:        Other:

## 2021-06-25 NOTE — TELEPHONE ENCOUNTER
Refill request from [facility] [address] [place], [place]    Medication: Hydroxychloroquine 200mg  Last Refill: 3/11/21  Last OV: 3/18/21  Last lab: 3/25/21  Future OV: 6/29/21  No future labs

## 2021-07-06 ENCOUNTER — COMMUNICATION - HEALTHEAST (OUTPATIENT)
Dept: RHEUMATOLOGY | Facility: CLINIC | Age: 61
End: 2021-07-06

## 2021-07-06 NOTE — TELEPHONE ENCOUNTER
Telephone Encounter by Anna Lr CMA at 7/6/2021  1:55 PM     Author: Anna Lr CMA Service: -- Author Type: Certified Medical Assistant    Filed: 7/6/2021  1:55 PM Encounter Date: 7/6/2021 Status: Signed    : Anna Lr CMA (Certified Medical Assistant)       Please give pt a call to schedule. Thank you      Next Appointment:   3 Months     Tests:      Please have labs performed in about 8 weeks.

## 2021-07-07 NOTE — TELEPHONE ENCOUNTER
Telephone Encounter by Emily Buckley at 7/7/2021 12:22 PM     Author: Emily Buckley Service: -- Author Type: --    Filed: 7/7/2021 12:23 PM Encounter Date: 7/6/2021 Status: Signed    : Emily Buckley       Patient scheduled in Oct. Patient said they will schedule their lab appt at a later date.

## 2021-07-20 DIAGNOSIS — M05.742 RHEUMATOID ARTHRITIS INVOLVING BOTH HANDS WITH POSITIVE RHEUMATOID FACTOR (H): ICD-10-CM

## 2021-07-20 DIAGNOSIS — M05.741 RHEUMATOID ARTHRITIS INVOLVING BOTH HANDS WITH POSITIVE RHEUMATOID FACTOR (H): ICD-10-CM

## 2021-07-22 RX ORDER — HYDROXYCHLOROQUINE SULFATE 200 MG/1
200 TABLET, FILM COATED ORAL 2 TIMES DAILY
Qty: 180 TABLET | Refills: 0 | Status: SHIPPED | OUTPATIENT
Start: 2021-07-22 | End: 2021-10-07

## 2021-07-26 DIAGNOSIS — E03.8 OTHER SPECIFIED HYPOTHYROIDISM: ICD-10-CM

## 2021-07-27 RX ORDER — LEVOTHYROXINE SODIUM 75 UG/1
TABLET ORAL
Qty: 90 TABLET | Refills: 1 | Status: SHIPPED | OUTPATIENT
Start: 2021-07-27 | End: 2021-10-28

## 2021-08-06 ENCOUNTER — E-VISIT (OUTPATIENT)
Dept: FAMILY MEDICINE | Facility: CLINIC | Age: 61
End: 2021-08-06
Payer: COMMERCIAL

## 2021-08-06 ENCOUNTER — TRANSFERRED RECORDS (OUTPATIENT)
Dept: HEALTH INFORMATION MANAGEMENT | Facility: CLINIC | Age: 61
End: 2021-08-06

## 2021-08-06 DIAGNOSIS — R30.0 DYSURIA: Primary | ICD-10-CM

## 2021-08-06 DIAGNOSIS — N39.0 ACUTE UTI (URINARY TRACT INFECTION): ICD-10-CM

## 2021-08-06 PROCEDURE — 99421 OL DIG E/M SVC 5-10 MIN: CPT | Performed by: PHYSICIAN ASSISTANT

## 2021-08-09 RX ORDER — NITROFURANTOIN 25; 75 MG/1; MG/1
100 CAPSULE ORAL 2 TIMES DAILY
Qty: 14 CAPSULE | Refills: 0 | Status: SHIPPED | OUTPATIENT
Start: 2021-08-09 | End: 2021-08-16

## 2021-08-09 NOTE — PATIENT INSTRUCTIONS
Dear Mae Connors    After reviewing your responses, I've been able to diagnose you with a urinary tract infection, which is a common infection of the bladder with bacteria.  This is not a sexually transmitted infection, though urinating immediately after intercourse can help prevent infections.  Drinking lots of fluids is also helpful to clear your current infection and prevent the next one.      I have sent a prescription for antibiotics to your pharmacy to treat this infection.    It is important that you take all of your prescribed medication even if your symptoms are improving after a few doses.  Taking all of your medicine helps prevent the symptoms from returning.     If your symptoms worsen, you develop pain in your back or stomach, develop fevers, or are not improving in 5 days, please contact your primary care provider for an appointment or visit any of our convenient Walk-in or Urgent Care Centers to be seen, which can be found on our website here.    Thanks again for choosing us as your health care partner,    Evangelista Vasquez PA-C

## 2021-09-09 ENCOUNTER — LAB (OUTPATIENT)
Dept: LAB | Facility: CLINIC | Age: 61
End: 2021-09-09
Payer: COMMERCIAL

## 2021-09-09 DIAGNOSIS — M05.9 SEROPOSITIVE RHEUMATOID ARTHRITIS (H): ICD-10-CM

## 2021-09-09 DIAGNOSIS — Z79.899 HISTORY OF LONG-TERM TREATMENT WITH HIGH-RISK MEDICATION: ICD-10-CM

## 2021-09-09 LAB
ERYTHROCYTE [DISTWIDTH] IN BLOOD BY AUTOMATED COUNT: 14.5 % (ref 10–15)
HCT VFR BLD AUTO: 38.3 % (ref 35–47)
HGB BLD-MCNC: 12.4 G/DL (ref 11.7–15.7)
MCH RBC QN AUTO: 29.7 PG (ref 26.5–33)
MCHC RBC AUTO-ENTMCNC: 32.4 G/DL (ref 31.5–36.5)
MCV RBC AUTO: 92 FL (ref 78–100)
PLATELET # BLD AUTO: 297 10E3/UL (ref 150–450)
RBC # BLD AUTO: 4.17 10E6/UL (ref 3.8–5.2)
WBC # BLD AUTO: 4.5 10E3/UL (ref 4–11)

## 2021-09-09 PROCEDURE — 84460 ALANINE AMINO (ALT) (SGPT): CPT

## 2021-09-09 PROCEDURE — 84450 TRANSFERASE (AST) (SGOT): CPT

## 2021-09-09 PROCEDURE — 85027 COMPLETE CBC AUTOMATED: CPT

## 2021-09-09 PROCEDURE — 82565 ASSAY OF CREATININE: CPT

## 2021-09-09 PROCEDURE — 82040 ASSAY OF SERUM ALBUMIN: CPT

## 2021-09-09 PROCEDURE — 36415 COLL VENOUS BLD VENIPUNCTURE: CPT

## 2021-09-10 LAB
ALBUMIN SERPL-MCNC: 3.8 G/DL (ref 3.4–5)
ALT SERPL W P-5'-P-CCNC: 45 U/L (ref 0–50)
AST SERPL W P-5'-P-CCNC: 33 U/L (ref 0–45)
CREAT SERPL-MCNC: 0.92 MG/DL (ref 0.52–1.04)
GFR SERPL CREATININE-BSD FRML MDRD: 68 ML/MIN/1.73M2

## 2021-09-16 DIAGNOSIS — M05.742 RHEUMATOID ARTHRITIS INVOLVING BOTH HANDS WITH POSITIVE RHEUMATOID FACTOR (H): ICD-10-CM

## 2021-09-16 DIAGNOSIS — M05.741 RHEUMATOID ARTHRITIS INVOLVING BOTH HANDS WITH POSITIVE RHEUMATOID FACTOR (H): ICD-10-CM

## 2021-09-19 ENCOUNTER — HEALTH MAINTENANCE LETTER (OUTPATIENT)
Age: 61
End: 2021-09-19

## 2021-10-07 ENCOUNTER — VIRTUAL VISIT (OUTPATIENT)
Dept: RHEUMATOLOGY | Facility: CLINIC | Age: 61
End: 2021-10-07
Payer: COMMERCIAL

## 2021-10-07 DIAGNOSIS — M25.531 PAIN IN BOTH WRISTS: ICD-10-CM

## 2021-10-07 DIAGNOSIS — M25.532 PAIN IN BOTH WRISTS: ICD-10-CM

## 2021-10-07 DIAGNOSIS — M05.9 SEROPOSITIVE RHEUMATOID ARTHRITIS (H): Primary | ICD-10-CM

## 2021-10-07 DIAGNOSIS — M05.742 RHEUMATOID ARTHRITIS INVOLVING BOTH HANDS WITH POSITIVE RHEUMATOID FACTOR (H): ICD-10-CM

## 2021-10-07 DIAGNOSIS — M79.641 PAIN IN BOTH HANDS: ICD-10-CM

## 2021-10-07 DIAGNOSIS — M05.741 RHEUMATOID ARTHRITIS INVOLVING BOTH HANDS WITH POSITIVE RHEUMATOID FACTOR (H): ICD-10-CM

## 2021-10-07 DIAGNOSIS — M79.642 PAIN IN BOTH HANDS: ICD-10-CM

## 2021-10-07 PROCEDURE — 99214 OFFICE O/P EST MOD 30 MIN: CPT | Mod: 95 | Performed by: INTERNAL MEDICINE

## 2021-10-07 RX ORDER — HYDROXYCHLOROQUINE SULFATE 200 MG/1
200 TABLET, FILM COATED ORAL 2 TIMES DAILY
Qty: 180 TABLET | Refills: 0 | Status: SHIPPED | OUTPATIENT
Start: 2021-10-07 | End: 2022-01-18

## 2021-10-07 RX ORDER — MELOXICAM 7.5 MG/1
TABLET ORAL
COMMUNITY
Start: 2021-08-31 | End: 2021-10-07

## 2021-10-07 RX ORDER — MELOXICAM 7.5 MG/1
TABLET ORAL
Qty: 180 TABLET | Refills: 0 | Status: SHIPPED | OUTPATIENT
Start: 2021-10-07 | End: 2022-01-18

## 2021-10-07 NOTE — PROGRESS NOTES
Mae Connors who presents today with a chief complaint of  No chief complaint on file.      Joint Pains: yes  Location: hands, fingers, wrists  Onset: years  Intensity:  2/10  AM Stiffness: Minutes  Alleviating/Aggravating Factors: moving increase pain  Tolerating Meds: yes  Other:      ROS:  Patient denies having any chest pain, shortness of breath, cough, abdominal pain, nausea, vomiting, rashes, fevers, oral ulcers and recent infections.  Patient admits to having a good appetite      Problem List:  Patient Active Problem List   Diagnosis     Allergic rhinitis     Female stress incontinence     Hypothyroidism     Mild major depression (H)     CARDIOVASCULAR SCREENING; LDL GOAL LESS THAN 160     Rheumatoid arthritis involving both hands with positive rheumatoid factor (H)     Unsatisfactory cervical Papanicolaou smear        PMH:   Past Medical History:   Diagnosis Date     Allergic rhinitis, cause unspecified 1990    Hx of immunotherapy , failed     Family history of breast cancer in female     Sister BRCA pos but patient tested negative 7/2013     Gout 12/21/2012     Renal disease     incontinence     Thyroid disease     hypothyroid     Unsatisfactory cervical Papanicolaou smear 10/22/2020    10/22/20       Surgical History:  Past Surgical History:   Procedure Laterality Date     COLONOSCOPY  8-13-12    Dr. Marcelo Briscoe at [facility]     COLONOSCOPY  8/13/2012    Procedure: COLONOSCOPY;  Colonoscopy  ;  Surgeon: Marcelo Briscoe MD, MD;  Location:  GI     EXCISE MASS FOOT Left 7/12/2016    Procedure: EXCISE MASS FOOT;  Surgeon: Marcelo Vaughn DPM;  Location: [facility] NASAL/SINUS SCOPE W THER INSTILL  2005     SLING TRANSPUBO WITHOUT ANTERIOR COLPORRHAPHY  11/21/2011    Procedure:SLING TRANSPUBO WITHOUT ANTERIOR COLPORRHAPHY; Pubovaginal Sling; Surgeon:KENNETH NEGRO; Location: OR       Family History:  Family History   Problem Relation Age of Onset     Unknown/Adopted Father      Coronary Artery Disease  Father      Cerebrovascular Disease Maternal Grandmother      Neurologic Disorder Maternal Grandmother         stroke,  in her 50s     Cerebrovascular Disease Paternal Grandfather      Neurologic Disorder Paternal Grandfather         stroke     Cancer Paternal Grandmother         Unsure what type of cancer       Social History:   reports current alcohol use. She reports that she does not use drugs.    Allergies:  Allergies   Allergen Reactions     Sulfa Drugs      Hives and trouble breathing        Current Medications:  Current Outpatient Medications   Medication Sig Dispense Refill     budesonide (RINOCORT AQUA) 32 MCG/ACT nasal spray Spray 1 spray into both nostrils daily       buPROPion (WELLBUTRIN XL) 150 MG 24 hr tablet TAKE 1 TABLET(150 MG) BY MOUTH EVERY MORNING 90 tablet 0     cetirizine (ZYRTEC) 10 MG tablet Take 1 tablet by mouth every evening. 90 tablet 3     cholecalciferol (VITAMIN D3) 25 mcg (1000 units) capsule Take 1 capsule by mouth daily       ciprofloxacin (CILOXAN) 0.3 % ophthalmic solution Place 1-2 drops Into the left eye every 4 hours 5 mL 0     folic acid 800 MCG tablet Take 800 mcg by mouth       hydroxychloroquine (PLAQUENIL) 200 MG tablet Take 1 tablet (200 mg) by mouth 2 times daily 180 tablet 0     levothyroxine (SYNTHROID/LEVOTHROID) 75 MCG tablet TAKE 1 TABLET(75 MCG) BY MOUTH DAILY 90 tablet 1     methotrexate 2.5 MG tablet TAKE 3 TABLETS BY MOUTH ONCE WEEKLY 36 tablet 0           Physical Exam:  Following up today via video visit, per Covid-19 pandemic requirements.    Verbal consent has been obtained for this service by care team member.    Video call start time:  8:41 AM    Video call end time: 8:54 AM    [employer] utilized for video call.    Patient location for video visit: Home     Provider location for video visit:  Home (working remotely)        Summary/Assessment:    History that includes seropositive rheumatoid arthritis.    Presents for a follow-up visit.    States wrists  doing better after receiving cortisone injections via orthopedics.    Patient also had repeat EMG of right hand consistent with having moderate carpal tunnel, had surgery at 3 weeks ago with good outcome.    Occasional arthralgias are mild and tolerable.    Takes methotrexate 3 tablets weekly.    Takes Plaquenil 200 mg twice a day.    States saw ophthalmology in the summer and told that retinal/visual field testing were fine.    On last visit we added meloxicam for wrist pains, currently taking twice a day.  Now that she is doing better would be interested in trial of backing down.    Latest drug monitoring labs noted to be stable.    Please see below for management plan.      From prior note:     - Just has occasional wrist pains when performing downward dog pose in yoga.    For occasional wrist pains, has wrist splints and utilizes Voltaren gel as needed.    Patient had elevated liver enzymes in March 2020 however this may have been secondary to some drinking, just before [employer] [place] Day.  Methotrexate was then decrease to 8 tablets weekly to 6 tablets weekly     - Was a patient of Dr. Donohue [facility], last seen June 2019.    Pleasant 59-year-old female with pertinent past medical history of a seropositive rheumatoid arthritis, presents to become established with another rheumatologist.    Patient states that she was first diagnosed with rheumatoid arthritis about 3 years ago however believes had symptoms to a milder extent over the past 5 to 10 years.  Diagnosed with rheumatoid arthritis by Dr. Reyna.    States that joints typically involved when her RA is active are wrists and fingers.  On one occasion had foot involvement as well.    Has been on methotrexate for a few years.  Over the past year Plaquenil was added, prednisone was tapered down/discontinued in the process.    Combination of methotrexate and Plaquenil has been working well until this past week when she began experiencing left wrist  pain.  On exam some synovitis noted involving left wrist, may also have component of left distal forearm/wrist tendinitis.  Patient describes being overactive over holiday weekend in preparing meals which triggered her symptoms.      Pertinent rheumatology/past medical history (please refer to above for more detailed history):      Seropositive rheumatoid arthritis (DX 2016, typically involving hands and wrists)    High risk medication use    Hx right carpal tunnel syndrome, status post surgical intervention (9/2021)    History ehrlichiosis    Episodes of transaminitis    Depression (established with a counselor)    Hypothyroid      Rheumatology medications provided/suggested:    Methotrexate  Folic acid       Pertinent medication from other providers or from otc (please refer to above for more detailed med list):    Glucosamine/chondroitin  Diclofenac gel      Pertinent medications already tried:     Prednisone taper (helpful)      Pertinent lab history:    2016, noted to have positive/elevated: Rheumatoid factor, CCP antibody    2016, noted to have negative/unremarkable: CHINMAY    Pertinent imaging/test history:        Other:    , has 4 children.  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    Denies regular alcohol beverage intake or tobacco use.    Standing order for labs placed every 2-3 months good through November 2021.    5 foot 2 and weighs about 170 pounds.      On prior visit, advised for occasional mild wrist pains with downward dog yoga pose, recommended if possible modifying this yoga pose and to listen to her body when experiencing pain, should avoid pushing through pain.     Plan:      Continue methotrexate 3 tablets weekly (decreased from 6 tablets due to elevated liver enzymes).  Continue avoiding alcoholic beverages.    Continue folic acid, takes 800 mcg over-the-counter daily, except the day when taking methotrexate.      Continue Plaquenil 200 mg twice a day for now.      Recommend  cutting back meloxicam from 7.5 mg twice a day to once a day for 1 to 2 weeks thereafter can try discontinuing and just take on a when necessary basis.  If pain resurfaces can restart.    As an alternative to meloxicam can continue utilizing Voltaren gel as needed and/or wrist splint as needed.    Established with orthopedics for wrist pains and right carpal tunnel, status post surgical intervention.    Recheck labs in about 8-9 weeks.    Follow-up in 4 months.      This note was transcribed using Dragon voice recognition software as a result unintentional grammatical errors or word substitutions may have occurred. Please contact our Rheumatology department if you need any clarification or if you have any related inquiries.      Robert Cotton DO   ....................  10/7/2021   8:02 AM

## 2021-10-07 NOTE — PATIENT INSTRUCTIONS
Summary of Your Rheumatology Visit    Next Appointment:   4 Months    Medications:    Please follow directives on pill bottle on how to take medication(s) provided.    Referrals:      Tests:     Please have labs performed in about 8-9 weeks.    Injections:      Other:

## 2021-10-22 ASSESSMENT — ENCOUNTER SYMPTOMS
BREAST MASS: 0
JOINT SWELLING: 0
EYE PAIN: 0
NAUSEA: 0
MYALGIAS: 0
WEAKNESS: 0
HEARTBURN: 0
SORE THROAT: 0
CONSTIPATION: 0
DIZZINESS: 0
HEADACHES: 0
HEMATURIA: 0
ABDOMINAL PAIN: 0
ARTHRALGIAS: 0
NERVOUS/ANXIOUS: 0
FEVER: 0
PARESTHESIAS: 0
DIARRHEA: 0
DYSURIA: 0
COUGH: 0
FREQUENCY: 0
PALPITATIONS: 0
SHORTNESS OF BREATH: 0
HEMATOCHEZIA: 0
CHILLS: 0

## 2021-10-25 ENCOUNTER — OFFICE VISIT (OUTPATIENT)
Dept: FAMILY MEDICINE | Facility: CLINIC | Age: 61
End: 2021-10-25
Payer: COMMERCIAL

## 2021-10-25 VITALS
SYSTOLIC BLOOD PRESSURE: 126 MMHG | DIASTOLIC BLOOD PRESSURE: 80 MMHG | HEIGHT: 62 IN | BODY MASS INDEX: 33.49 KG/M2 | TEMPERATURE: 98.4 F | WEIGHT: 182 LBS | OXYGEN SATURATION: 97 % | RESPIRATION RATE: 16 BRPM | HEART RATE: 68 BPM

## 2021-10-25 DIAGNOSIS — E03.8 OTHER SPECIFIED HYPOTHYROIDISM: ICD-10-CM

## 2021-10-25 DIAGNOSIS — Z00.00 ROUTINE GENERAL MEDICAL EXAMINATION AT A HEALTH CARE FACILITY: Primary | ICD-10-CM

## 2021-10-25 DIAGNOSIS — D23.5 BENIGN NEOPLASM OF SKIN OF TRUNK: ICD-10-CM

## 2021-10-25 DIAGNOSIS — Z23 HIGH PRIORITY FOR 2019-NCOV VACCINE: ICD-10-CM

## 2021-10-25 DIAGNOSIS — E03.9 HYPOTHYROIDISM, UNSPECIFIED TYPE: ICD-10-CM

## 2021-10-25 DIAGNOSIS — F32.0 MILD MAJOR DEPRESSION (H): ICD-10-CM

## 2021-10-25 DIAGNOSIS — Z12.31 VISIT FOR SCREENING MAMMOGRAM: ICD-10-CM

## 2021-10-25 PROCEDURE — 0003A COVID-19,PF,PFIZER (12+ YRS): CPT | Performed by: PHYSICIAN ASSISTANT

## 2021-10-25 PROCEDURE — 91300 COVID-19,PF,PFIZER (12+ YRS): CPT | Performed by: PHYSICIAN ASSISTANT

## 2021-10-25 PROCEDURE — 84443 ASSAY THYROID STIM HORMONE: CPT | Performed by: PHYSICIAN ASSISTANT

## 2021-10-25 PROCEDURE — 99213 OFFICE O/P EST LOW 20 MIN: CPT | Mod: 25 | Performed by: PHYSICIAN ASSISTANT

## 2021-10-25 PROCEDURE — 36415 COLL VENOUS BLD VENIPUNCTURE: CPT | Performed by: PHYSICIAN ASSISTANT

## 2021-10-25 PROCEDURE — 99396 PREV VISIT EST AGE 40-64: CPT | Mod: 25 | Performed by: PHYSICIAN ASSISTANT

## 2021-10-25 PROCEDURE — 90682 RIV4 VACC RECOMBINANT DNA IM: CPT | Performed by: PHYSICIAN ASSISTANT

## 2021-10-25 PROCEDURE — 80061 LIPID PANEL: CPT | Performed by: PHYSICIAN ASSISTANT

## 2021-10-25 PROCEDURE — 84439 ASSAY OF FREE THYROXINE: CPT | Performed by: PHYSICIAN ASSISTANT

## 2021-10-25 PROCEDURE — 96127 BRIEF EMOTIONAL/BEHAV ASSMT: CPT | Performed by: PHYSICIAN ASSISTANT

## 2021-10-25 PROCEDURE — 90471 IMMUNIZATION ADMIN: CPT | Performed by: PHYSICIAN ASSISTANT

## 2021-10-25 RX ORDER — BUPROPION HYDROCHLORIDE 150 MG/1
TABLET ORAL
Qty: 90 TABLET | Refills: 1 | Status: SHIPPED | OUTPATIENT
Start: 2021-10-25 | End: 2022-04-15

## 2021-10-25 ASSESSMENT — ANXIETY QUESTIONNAIRES
5. BEING SO RESTLESS THAT IT IS HARD TO SIT STILL: NOT AT ALL
2. NOT BEING ABLE TO STOP OR CONTROL WORRYING: NOT AT ALL
3. WORRYING TOO MUCH ABOUT DIFFERENT THINGS: NOT AT ALL
IF YOU CHECKED OFF ANY PROBLEMS ON THIS QUESTIONNAIRE, HOW DIFFICULT HAVE THESE PROBLEMS MADE IT FOR YOU TO DO YOUR WORK, TAKE CARE OF THINGS AT HOME, OR GET ALONG WITH OTHER PEOPLE: NOT DIFFICULT AT ALL
1. FEELING NERVOUS, ANXIOUS, OR ON EDGE: NOT AT ALL
GAD7 TOTAL SCORE: 0
7. FEELING AFRAID AS IF SOMETHING AWFUL MIGHT HAPPEN: NOT AT ALL
6. BECOMING EASILY ANNOYED OR IRRITABLE: NOT AT ALL

## 2021-10-25 ASSESSMENT — ENCOUNTER SYMPTOMS
FEVER: 0
JOINT SWELLING: 0
NERVOUS/ANXIOUS: 0
DIARRHEA: 0
HEADACHES: 0
WEAKNESS: 0
DYSURIA: 0
SHORTNESS OF BREATH: 0
ABDOMINAL PAIN: 0
BREAST MASS: 0
PARESTHESIAS: 0
COUGH: 0
HEMATOCHEZIA: 0
CONSTIPATION: 0
ARTHRALGIAS: 0
MYALGIAS: 0
SORE THROAT: 0
PALPITATIONS: 0
HEARTBURN: 0
FREQUENCY: 0
HEMATURIA: 0
EYE PAIN: 0
CHILLS: 0
DIZZINESS: 0
NAUSEA: 0

## 2021-10-25 ASSESSMENT — PATIENT HEALTH QUESTIONNAIRE - PHQ9
SUM OF ALL RESPONSES TO PHQ QUESTIONS 1-9: 1
5. POOR APPETITE OR OVEREATING: NOT AT ALL

## 2021-10-25 ASSESSMENT — PAIN SCALES - GENERAL: PAINLEVEL: NO PAIN (0)

## 2021-10-25 ASSESSMENT — MIFFLIN-ST. JEOR: SCORE: 1353.8

## 2021-10-25 NOTE — PROGRESS NOTES
SUBJECTIVE:   CC: Mae Connors is an 61 year old woman who presents for preventive health visit.       Patient has been advised of split billing requirements and indicates understanding: Yes  Healthy Habits:     Getting at least 3 servings of Calcium per day:  Yes    Bi-annual eye exam:  Yes    Dental care twice a year:  Yes    Sleep apnea or symptoms of sleep apnea:  None    Diet:  Regular (no restrictions)    Frequency of exercise:  None    Taking medications regularly:  Yes    Medication side effects:  None    PHQ-2 Total Score: 0    Additional concerns today:  Yes    Patient here for physical today.  See's rheum regularly for management of RA.    She would like her flu shot and is due for covid vaccine booster.    Mood- very stable.  She is considering tapering off Wellbutrin after this winter and only taking seasonally.  She started it during some family and personal stress that is now resolved.  She thinks she has seasonal issues though and would like to consider taking in fall and winter.    PROBLEMS TO ADD ON...  Hypothyroidism Follow-up      Since last visit, patient describes the following symptoms: Weight stable, no hair loss, no skin changes, no constipation, no loose stools      Skin- itchy mole on center of back.      Today's PHQ-2 Score:   PHQ-2 ( 1999 Pfizer) 10/22/2021   Q1: Little interest or pleasure in doing things 0   Q2: Feeling down, depressed or hopeless 0   PHQ-2 Score 0   Q1: Little interest or pleasure in doing things Not at all   Q2: Feeling down, depressed or hopeless Not at all   PHQ-2 Score 0       Abuse: Current or Past (Physical, Sexual or Emotional) - No  Do you feel safe in your environment? Yes        Social History     Tobacco Use     Smoking status: Never Smoker     Smokeless tobacco: Never Used   Substance Use Topics     Alcohol use: Yes     Alcohol/week: 0.0 - 1.0 standard drinks     Comment: occ.         Alcohol Use 10/22/2021   Prescreen: >3 drinks/day or >7  drinks/week? No   Prescreen: >3 drinks/day or >7 drinks/week? -       Reviewed orders with patient.  Reviewed health maintenance and updated orders accordingly - Yes  Lab work is in process  Labs reviewed in EPIC    Breast Cancer Screening:  Any new diagnosis of family breast, ovarian, or bowel cancer? No    FHS-7: No flowsheet data found.    Mammogram Screening: Recommended mammography every 1-2 years with patient discussion and risk factor consideration  Pertinent mammograms are reviewed under the imaging tab.    History of abnormal Pap smear: NO - age 30-65 PAP every 5 years with negative HPV co-testing recommended  PAP / HPV Latest Ref Rng & Units 2/25/2021 10/22/2020 10/24/2014   PAP (Historical) - NIL UNSAT NIL   HPV16 NEG:Negative Negative Negative -   HPV18 NEG:Negative Negative Negative -   HRHPV NEG:Negative Negative Negative -     Reviewed and updated as needed this visit by clinical staff  Tobacco  Allergies  Meds   Med Hx  Surg Hx  Fam Hx  Soc Hx        Reviewed and updated as needed this visit by Provider                  Review of Systems   Constitutional: Negative for chills and fever.   HENT: Negative for congestion, ear pain, hearing loss and sore throat.    Eyes: Negative for pain and visual disturbance.   Respiratory: Negative for cough and shortness of breath.    Cardiovascular: Negative for chest pain, palpitations and peripheral edema.   Gastrointestinal: Negative for abdominal pain, constipation, diarrhea, heartburn, hematochezia and nausea.   Breasts:  Negative for tenderness, breast mass and discharge.   Genitourinary: Positive for urgency. Negative for dysuria, frequency, genital sores, hematuria, pelvic pain, vaginal bleeding and vaginal discharge.   Musculoskeletal: Negative for arthralgias, joint swelling and myalgias.   Skin: Negative for rash.   Neurological: Negative for dizziness, weakness, headaches and paresthesias.   Psychiatric/Behavioral: Negative for mood changes. The  "patient is not nervous/anxious.        OBJECTIVE:   /80 (BP Location: Right arm, Patient Position: Sitting, Cuff Size: Adult Large)   Pulse 68   Temp 98.4  F (36.9  C) (Oral)   Resp 16   Ht 1.575 m (5' 2\")   Wt 82.6 kg (182 lb)   LMP 10/13/2014 (Approximate)   SpO2 97%   BMI 33.29 kg/m    Physical Exam  GENERAL: healthy, alert and no distress  EYES: Eyes grossly normal to inspection, PERRL and conjunctivae and sclerae normal  HENT: ear canals and TM's normal, nose and mouth without ulcers or lesions  NECK: no adenopathy, no asymmetry, masses, or scars and thyroid normal to palpation  RESP: lungs clear to auscultation - no rales, rhonchi or wheezes  BREAST: normal without masses, tenderness or nipple discharge and no palpable axillary masses or adenopathy  CV: regular rate and rhythm, normal S1 S2, no S3 or S4, no murmur, click or rub, no peripheral edema and peripheral pulses strong  ABDOMEN: soft, nontender, no hepatosplenomegaly, no masses and bowel sounds normal  MS: no gross musculoskeletal defects noted, no edema  SKIN: mid back- between scapula- light pink, raised symmetrical 0.5mm mole  NEURO: Normal strength and tone, mentation intact and speech normal  PSYCH: mentation appears normal, affect normal/bright    Diagnostic Test Results:  Labs reviewed in Epic    ASSESSMENT/PLAN:   (Z00.00) Routine general medical examination at a health care facility  (primary encounter diagnosis)  Comment:   Plan: Lipid panel reflex to direct LDL Fasting        Check fasting lipid today.    (Z12.31) Visit for screening mammogram  Comment:   Plan: MA SCREENING DIGITAL BILAT - Future  (s+30)        Due for screening- got covid booster today so will schedule for 8-12 weeks from now.    (E03.9) Hypothyroidism, unspecified type  Comment:   Plan: TSH WITH FREE T4 REFLEX        Check lab and will refill medication pending results.    (Z23) High priority for 2019-nCoV vaccine  Comment:   Plan: COVID-19,PF,PFIZER        " "Given today.    (F32.0) Mild major depression (H)  Comment:   Plan: buPROPion (WELLBUTRIN XL) 150 MG 24 hr tablet        Stable- discussed tapering off in the spring- she will monitor mood and let me know if there are any problems.  Refilled today so she can continue.  Follow up in the fall or as needed sooner.    (D23.5) Benign neoplasm of skin of trunk  Comment:   Plan: Adult Dermatology Referral        Referral given today.      Patient has been advised of split billing requirements and indicates understanding: Yes  COUNSELING:  Reviewed preventive health counseling, as reflected in patient instructions    Estimated body mass index is 33.29 kg/m  as calculated from the following:    Height as of this encounter: 1.575 m (5' 2\").    Weight as of this encounter: 82.6 kg (182 lb).    Weight management plan: Discussed healthy diet and exercise guidelines    She reports that she has never smoked. She has never used smokeless tobacco.      Counseling Resources:  ATP IV Guidelines  Pooled Cohorts Equation Calculator  Breast Cancer Risk Calculator  BRCA-Related Cancer Risk Assessment: FHS-7 Tool  FRAX Risk Assessment  ICSI Preventive Guidelines  Dietary Guidelines for Americans, 2010  USDA's MyPlate  ASA Prophylaxis  Lung CA Screening    Evangelista Vasquez PA-C  [facility]    "

## 2021-10-25 NOTE — PATIENT INSTRUCTIONS

## 2021-10-26 LAB
CHOLEST SERPL-MCNC: 190 MG/DL
FASTING STATUS PATIENT QL REPORTED: YES
HDLC SERPL-MCNC: 92 MG/DL
LDLC SERPL CALC-MCNC: 85 MG/DL
NONHDLC SERPL-MCNC: 98 MG/DL
T4 FREE SERPL-MCNC: 0.87 NG/DL (ref 0.76–1.46)
TRIGL SERPL-MCNC: 66 MG/DL
TSH SERPL DL<=0.005 MIU/L-ACNC: 7.75 MU/L (ref 0.4–4)

## 2021-10-26 ASSESSMENT — ANXIETY QUESTIONNAIRES: GAD7 TOTAL SCORE: 0

## 2021-10-28 RX ORDER — LEVOTHYROXINE SODIUM 88 UG/1
TABLET ORAL
Qty: 90 TABLET | Refills: 0 | Status: SHIPPED | OUTPATIENT
Start: 2021-10-28 | End: 2022-02-07

## 2022-01-18 DIAGNOSIS — M05.9 SEROPOSITIVE RHEUMATOID ARTHRITIS (H): ICD-10-CM

## 2022-01-18 DIAGNOSIS — M25.531 PAIN IN BOTH WRISTS: ICD-10-CM

## 2022-01-18 DIAGNOSIS — M79.641 PAIN IN BOTH HANDS: ICD-10-CM

## 2022-01-18 DIAGNOSIS — M25.532 PAIN IN BOTH WRISTS: ICD-10-CM

## 2022-01-18 DIAGNOSIS — M79.642 PAIN IN BOTH HANDS: ICD-10-CM

## 2022-01-18 RX ORDER — HYDROXYCHLOROQUINE SULFATE 200 MG/1
200 TABLET, FILM COATED ORAL 2 TIMES DAILY
Qty: 180 TABLET | Refills: 0 | Status: SHIPPED | OUTPATIENT
Start: 2022-01-18 | End: 2022-04-15

## 2022-01-18 RX ORDER — MELOXICAM 7.5 MG/1
TABLET ORAL
Qty: 60 TABLET | Refills: 1 | Status: SHIPPED | OUTPATIENT
Start: 2022-01-18 | End: 2022-09-28

## 2022-01-18 NOTE — TELEPHONE ENCOUNTER
Rx refilled per rheum RN refill protocol.       Continue Plaquenil 200 mg twice a day for now.    Recommend cutting back meloxicam from 7.5 mg twice a day to once a day for 1 to 2 weeks thereafter can try discontinuing and just take on a when necessary basis.  If pain resurfaces can restart.      Eye exam in media on 8/25/21

## 2022-01-18 NOTE — TELEPHONE ENCOUNTER
Refill request received from [facility] pharmacy for meloxicam 7.5mg.     Last OV 10/7/2021  Last lab 9/9/2021  Future appt 2/8/2022    Rx sent for review.

## 2022-02-04 ENCOUNTER — LAB (OUTPATIENT)
Dept: LAB | Facility: CLINIC | Age: 62
End: 2022-02-04
Payer: COMMERCIAL

## 2022-02-04 ENCOUNTER — DOCUMENTATION ONLY (OUTPATIENT)
Dept: LAB | Facility: CLINIC | Age: 62
End: 2022-02-04

## 2022-02-04 DIAGNOSIS — M05.9 SEROPOSITIVE RHEUMATOID ARTHRITIS (H): ICD-10-CM

## 2022-02-04 DIAGNOSIS — E03.9 HYPOTHYROIDISM, UNSPECIFIED TYPE: ICD-10-CM

## 2022-02-04 DIAGNOSIS — Z79.899 HISTORY OF LONG-TERM TREATMENT WITH HIGH-RISK MEDICATION: ICD-10-CM

## 2022-02-04 DIAGNOSIS — E03.9 HYPOTHYROIDISM, UNSPECIFIED TYPE: Primary | ICD-10-CM

## 2022-02-04 LAB
ALBUMIN SERPL-MCNC: 3.3 G/DL (ref 3.4–5)
ALT SERPL W P-5'-P-CCNC: 29 U/L (ref 0–50)
AST SERPL W P-5'-P-CCNC: 20 U/L (ref 0–45)
CREAT SERPL-MCNC: 0.81 MG/DL (ref 0.52–1.04)
ERYTHROCYTE [DISTWIDTH] IN BLOOD BY AUTOMATED COUNT: 13.3 % (ref 10–15)
GFR SERPL CREATININE-BSD FRML MDRD: 83 ML/MIN/1.73M2
HCT VFR BLD AUTO: 36.8 % (ref 35–47)
HGB BLD-MCNC: 11.8 G/DL (ref 11.7–15.7)
HOLD SPECIMEN: NORMAL
MCH RBC QN AUTO: 28.9 PG (ref 26.5–33)
MCHC RBC AUTO-ENTMCNC: 32.1 G/DL (ref 31.5–36.5)
MCV RBC AUTO: 90 FL (ref 78–100)
PLATELET # BLD AUTO: 301 10E3/UL (ref 150–450)
RBC # BLD AUTO: 4.08 10E6/UL (ref 3.8–5.2)
T4 FREE SERPL-MCNC: 0.94 NG/DL (ref 0.76–1.46)
T4 FREE SERPL-MCNC: 0.94 NG/DL (ref 0.76–1.46)
TSH SERPL DL<=0.005 MIU/L-ACNC: 8.44 MU/L (ref 0.4–4)
TSH SERPL DL<=0.005 MIU/L-ACNC: 9.11 MU/L (ref 0.4–4)
WBC # BLD AUTO: 4.8 10E3/UL (ref 4–11)

## 2022-02-04 PROCEDURE — 84443 ASSAY THYROID STIM HORMONE: CPT

## 2022-02-04 PROCEDURE — 84460 ALANINE AMINO (ALT) (SGPT): CPT

## 2022-02-04 PROCEDURE — 82565 ASSAY OF CREATININE: CPT

## 2022-02-04 PROCEDURE — 84450 TRANSFERASE (AST) (SGOT): CPT

## 2022-02-04 PROCEDURE — 82040 ASSAY OF SERUM ALBUMIN: CPT

## 2022-02-04 PROCEDURE — 85027 COMPLETE CBC AUTOMATED: CPT

## 2022-02-04 PROCEDURE — 36415 COLL VENOUS BLD VENIPUNCTURE: CPT

## 2022-02-04 PROCEDURE — 84439 ASSAY OF FREE THYROXINE: CPT

## 2022-02-04 NOTE — PROGRESS NOTES
This patient was here this morning and was expecting a thyroid test from you. Please place an order if appropriate, thanks Tammy

## 2022-02-07 DIAGNOSIS — E03.8 OTHER SPECIFIED HYPOTHYROIDISM: ICD-10-CM

## 2022-02-07 RX ORDER — LEVOTHYROXINE SODIUM 88 UG/1
TABLET ORAL
Qty: 90 TABLET | Refills: 1 | Status: SHIPPED | OUTPATIENT
Start: 2022-02-07 | End: 2022-08-09

## 2022-02-08 ENCOUNTER — VIRTUAL VISIT (OUTPATIENT)
Dept: RHEUMATOLOGY | Facility: CLINIC | Age: 62
End: 2022-02-08
Payer: COMMERCIAL

## 2022-02-08 DIAGNOSIS — M05.9 SEROPOSITIVE RHEUMATOID ARTHRITIS (H): Primary | ICD-10-CM

## 2022-02-08 DIAGNOSIS — Z79.899 HIGH RISK MEDICATION USE: ICD-10-CM

## 2022-02-08 PROCEDURE — 99214 OFFICE O/P EST MOD 30 MIN: CPT | Mod: 95 | Performed by: INTERNAL MEDICINE

## 2022-02-08 NOTE — PATIENT INSTRUCTIONS
Summary of Your Rheumatology Visit    Next Appointment:  4 Months      Medications:      Referrals:      Tests:     Please have labs performed in about 11-12 weeks.       Injections:      Other:

## 2022-02-08 NOTE — PROGRESS NOTES
Mae Connors who presents today with a chief complaint of  No chief complaint on file.      Joint Pains: no  Location: n/a  Onset:n/a  Intensity: 0 /10  AM Stiffness: none  Alleviating/Aggravating Factors: n/a  Tolerating Meds: yes  Other: covid + 3 weeks      ROS:  Patient denies having any chest pain, shortness of breath, cough, abdominal pain, nausea, vomiting, rashes, fevers, oral ulcers and recent infections.  Patient admits to having a good appetite      Problem List:  Patient Active Problem List   Diagnosis     Allergic rhinitis     Female stress incontinence     Hypothyroidism     Mild major depression (H)     CARDIOVASCULAR SCREENING; LDL GOAL LESS THAN 160     Rheumatoid arthritis involving both hands with positive rheumatoid factor (H)     Unsatisfactory cervical Papanicolaou smear        PMH:   Past Medical History:   Diagnosis Date     Allergic rhinitis, cause unspecified 1990    Hx of immunotherapy , failed     Arthritis July 2010    rheumatoid and osteo     Depressive disorder     In the past     Family history of breast cancer in female     Sister BRCA pos but patient tested negative 7/2013     Gout 12/21/2012     Renal disease     incontinence     Thyroid disease     hypothyroid     Unsatisfactory cervical Papanicolaou smear 10/22/2020    10/22/20       Surgical History:  Past Surgical History:   Procedure Laterality Date     BIOPSY  October 1, 2015    Skin - negative     CARPAL TUNNEL RELEASE RT/LT Right 09/23/2021     COLONOSCOPY  8-13-12    Dr. Marcelo Briscoe at [facility]     COLONOSCOPY  8/13/2012    Procedure: COLONOSCOPY;  Colonoscopy  ;  Surgeon: Marcelo Briscoe MD, MD;  Location:  GI     EXCISE MASS FOOT Left 7/12/2016    Procedure: EXCISE MASS FOOT;  Surgeon: Marcelo Vaughn DPM;  Location: [facility]     HC NASAL/SINUS SCOPE W THER INSTILL  2005     SLING TRANSPUBO WITHOUT ANTERIOR COLPORRHAPHY  11/21/2011    Procedure:SLING TRANSPUBO WITHOUT ANTERIOR COLPORRHAPHY; Pubovaginal Sling;  Surgeon:KENNETH NEGRO; Location:[facility] OR       Family History:  Family History   Problem Relation Age of Onset     Unknown/Adopted Father      Coronary Artery Disease Father      Cerebrovascular Disease Father      Prostate Cancer Father      Depression Father      Cerebrovascular Disease Maternal Grandmother      Neurologic Disorder Maternal Grandmother         stroke,  in her 50s     Cerebrovascular Disease Paternal Grandfather      Neurologic Disorder Paternal Grandfather         stroke     Cancer Paternal Grandmother         Unsure what type of cancer     Hyperlipidemia Sister      Breast Cancer Sister        Social History:   reports that she has never smoked. She has never used smokeless tobacco. She reports current alcohol use. She reports that she does not use drugs.    Allergies:  Allergies   Allergen Reactions     Sulfa Drugs      Hives and trouble breathing        Current Medications:  Current Outpatient Medications   Medication Sig Dispense Refill     budesonide (RINOCORT AQUA) 32 MCG/ACT nasal spray Spray 1 spray into both nostrils daily       buPROPion (WELLBUTRIN XL) 150 MG 24 hr tablet TAKE 1 TABLET(150 MG) BY MOUTH EVERY MORNING 90 tablet 1     cetirizine (ZYRTEC) 10 MG tablet Take 1 tablet by mouth every evening. 90 tablet 3     cholecalciferol (VITAMIN D3) 25 mcg (1000 units) capsule Take 1 capsule by mouth daily       folic acid 800 MCG tablet Take 800 mcg by mouth       hydroxychloroquine (PLAQUENIL) 200 MG tablet Take 1 tablet (200 mg) by mouth 2 times daily 180 tablet 0     levothyroxine (SYNTHROID/LEVOTHROID) 88 MCG tablet TAKE 1 TABLET(75 MCG) BY MOUTH DAILY 90 tablet 1     meloxicam (MOBIC) 7.5 MG tablet Take 1 tablet p.o. twice daily or 2 tabs p.o. daily, prn. Take with food. 60 tablet 1     methotrexate 2.5 MG tablet TAKE 3 TABLETS BY MOUTH ONCE WEEKLY 36 tablet 0           Physical Exam:  Following up today via video visit, per Covid-19 pandemic requirements.    Verbal consent has  been obtained for this service by care team member.    Video call start time: 8:39 AM     Video call end time: 8:52 AM    [employer] utilized for video call.    Patient location for video visit: Home     Provider location for video visit:  Home (working remotely)      Summary/Assessment:    History that includes seropositive rheumatoid arthritis.    Presents for a follow-up visit.    Claims to be doing well on current combination    Takes methotrexate 3 tablets weekly.    Takes Plaquenil 200 mg twice a day.    States saw ophthalmology in the summer and told that retinal/visual field testing were fine.    Currently not utilizing meloxicam.    Latest drug monitoring labs noted to be stable.    Please see below for management plan.      From prior note(s):       - Just has occasional wrist pains when performing downward dog pose in yoga.    For occasional wrist pains, has wrist splints and utilizes Voltaren gel as needed.    Patient had elevated liver enzymes in March 2020 however this may have been secondary to some drinking, just before [employer] [place] Day.  Methotrexate was then decrease to 8 tablets weekly to 6 tablets weekly     - Was a patient of Dr. Donoh[facility], last seen June 2019.    Pleasant 59-year-old female with pertinent past medical history of a seropositive rheumatoid arthritis, presents to become established with another rheumatologist.    Patient states that she was first diagnosed with rheumatoid arthritis about 3 years ago however believes had symptoms to a milder extent over the past 5 to 10 years.  Diagnosed with rheumatoid arthritis by Dr. Reyna.    States that joints typically involved when her RA is active are wrists and fingers.  On one occasion had foot involvement as well.    Has been on methotrexate for a few years.  Over the past year Plaquenil was added, prednisone was tapered down/discontinued in the process.    Combination of methotrexate and Plaquenil has been working well  until this past week when she began experiencing left wrist pain.  On exam some synovitis noted involving left wrist, may also have component of left distal forearm/wrist tendinitis.  Patient describes being overactive over holiday weekend in preparing meals which triggered her symptoms.      Pertinent rheumatology/past medical history (please refer to above for more detailed history):      Seropositive rheumatoid arthritis (DX 2016, typically involving hands and wrists)    High risk medication use    Hx right carpal tunnel syndrome, status post surgical intervention (9/2021)    History ehrlichiosis    Episodes of transaminitis    Depression (established with a counselor)    Hypothyroid      Rheumatology medications provided/suggested:    Methotrexate  Folic acid       Pertinent medication from other providers or from otc (please refer to above for more detailed med list):    Glucosamine/chondroitin  Diclofenac gel      Pertinent medications already tried:     Prednisone taper (helpful)  Wrist cortisone injections (orthopedics, helpful)    Pertinent lab history:    2016, noted to have positive/elevated: Rheumatoid factor, CCP antibody    2016, noted to have negative/unremarkable: CHINMAY    Pertinent imaging/test history:        Other:    , has 4 children.  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    Denies regular alcohol beverage intake or tobacco use.    Standing order for labs placed every 2-3 months good through November 2021.    5 foot 2 and weighs about 175 pounds.      On prior visit, advised for occasional mild wrist pains with downward dog yoga pose, recommended if possible modifying this yoga pose and to listen to her body when experiencing pain, should avoid pushing through pain.       Plan:      Continue methotrexate 3 tablets weekly (decreased from 6 tablets due to elevated liver enzymes).  Continue avoiding alcoholic beverages.    Continue folic acid, takes 800 mcg over-the-counter daily,  except the day when taking methotrexate.      Continue Plaquenil 200 mg twice a day for now.  Patient made aware that if stability maintained and desires she can try cutting back to 300 mg daily.    Continue meloxicam from 7.5 mg daily-twice daily, as needed.      As an alternative to meloxicam can continue utilizing Voltaren gel as needed and/or wrist splint as needed.    Established with orthopedics for wrist pains and right carpal tunnel, status post surgical intervention.    Recheck labs in about 11-12 weeks.    Follow-up in 4 months.        This note was transcribed using Dragon voice recognition software as a result unintentional grammatical errors or word substitutions may have occurred. Please contact our Rheumatology department if you need any clarification or if you have any related inquiries.      Robert Cotton DO   ....................  2/8/2022   8:02 AM

## 2022-02-25 ENCOUNTER — ANCILLARY PROCEDURE (OUTPATIENT)
Dept: MAMMOGRAPHY | Facility: CLINIC | Age: 62
End: 2022-02-25
Payer: COMMERCIAL

## 2022-02-25 DIAGNOSIS — Z12.31 VISIT FOR SCREENING MAMMOGRAM: ICD-10-CM

## 2022-02-25 PROCEDURE — 77067 SCR MAMMO BI INCL CAD: CPT | Mod: TC | Performed by: RADIOLOGY

## 2022-02-25 PROCEDURE — 77063 BREAST TOMOSYNTHESIS BI: CPT | Mod: TC | Performed by: RADIOLOGY

## 2022-04-14 DIAGNOSIS — F32.0 MILD MAJOR DEPRESSION (H): ICD-10-CM

## 2022-04-15 ENCOUNTER — MYC MEDICAL ADVICE (OUTPATIENT)
Dept: FAMILY MEDICINE | Facility: CLINIC | Age: 62
End: 2022-04-15
Payer: COMMERCIAL

## 2022-04-15 DIAGNOSIS — M05.9 SEROPOSITIVE RHEUMATOID ARTHRITIS (H): ICD-10-CM

## 2022-04-15 RX ORDER — HYDROXYCHLOROQUINE SULFATE 200 MG/1
200 TABLET, FILM COATED ORAL 2 TIMES DAILY
Qty: 180 TABLET | Refills: 0 | Status: SHIPPED | OUTPATIENT
Start: 2022-04-15 | End: 2022-07-13

## 2022-04-15 RX ORDER — BUPROPION HYDROCHLORIDE 150 MG/1
TABLET ORAL
Qty: 90 TABLET | Refills: 0 | Status: SHIPPED | OUTPATIENT
Start: 2022-04-15 | End: 2022-11-15

## 2022-04-15 NOTE — TELEPHONE ENCOUNTER
Patient advised that she won't take this until November and plans to F/U w/ PCP then.  -Gisell Lorenzo

## 2022-04-15 NOTE — TELEPHONE ENCOUNTER
Medication is being filled for 1 time refill only due to:  Patient needs to be seen because med check due.  Prescription approved per [facility] Refill Protocol.  Routed to  for scheduling  Asia Rivers RN, BSN  [facility]

## 2022-04-15 NOTE — TELEPHONE ENCOUNTER
1st attempt - Left message for patient to schedule appointment w/ PCP for medication check.  -Gisell REYES

## 2022-04-18 ENCOUNTER — OFFICE VISIT (OUTPATIENT)
Dept: DERMATOLOGY | Facility: CLINIC | Age: 62
End: 2022-04-18
Payer: COMMERCIAL

## 2022-04-18 VITALS — SYSTOLIC BLOOD PRESSURE: 160 MMHG | HEART RATE: 64 BPM | OXYGEN SATURATION: 98 % | DIASTOLIC BLOOD PRESSURE: 83 MMHG

## 2022-04-18 DIAGNOSIS — L81.4 LENTIGINES: ICD-10-CM

## 2022-04-18 DIAGNOSIS — L82.0 INFLAMED SEBORRHEIC KERATOSIS: Primary | ICD-10-CM

## 2022-04-18 DIAGNOSIS — L91.8 INFLAMED SKIN TAG: ICD-10-CM

## 2022-04-18 DIAGNOSIS — D18.01 CHERRY ANGIOMA: ICD-10-CM

## 2022-04-18 DIAGNOSIS — L82.1 SEBORRHEIC KERATOSES: ICD-10-CM

## 2022-04-18 PROCEDURE — 99243 OFF/OP CNSLTJ NEW/EST LOW 30: CPT | Mod: 25 | Performed by: PHYSICIAN ASSISTANT

## 2022-04-18 PROCEDURE — 17110 DESTRUCTION B9 LES UP TO 14: CPT | Performed by: PHYSICIAN ASSISTANT

## 2022-04-18 PROCEDURE — 11200 RMVL SKIN TAGS UP TO&INC 15: CPT | Mod: 59 | Performed by: PHYSICIAN ASSISTANT

## 2022-04-18 NOTE — PROGRESS NOTES
HPI:  I was asked to see pt by Evangelista Vasquez PA-C. Mae Connors is a 62 year old female patient here today for bothersome spots on back and right flank .  Patient states this has been present for a while.  Patient reports the following symptoms: itchy, scaly .  Patient reports the following previous treatments: none.  Patient reports the following modifying factors: none.  Associated symptoms: none. Also has some very irritating/bothersome spots on neck line.  Patient has no other skin complaints today.  Remainder of the HPI, Meds, PMH, Allergies, FH, and SH was reviewed in chart.    Pertinent Hx:   No personal or family history of skin cancer    Past Medical History:   Diagnosis Date     Allergic rhinitis, cause unspecified 1990    Hx of immunotherapy , failed     Arthritis July 2010    rheumatoid and osteo     Depressive disorder     In the past     Family history of breast cancer in female     Sister BRCA pos but patient tested negative 7/2013     Gout 12/21/2012     Renal disease     incontinence     Thyroid disease     hypothyroid     Unsatisfactory cervical Papanicolaou smear 10/22/2020    10/22/20       Past Surgical History:   Procedure Laterality Date     BIOPSY  October 1, 2015    Skin - negative     CARPAL TUNNEL RELEASE RT/LT Right 09/23/2021     COLONOSCOPY  8-13-12    Dr. Marcelo Briscoe at [facility]     COLONOSCOPY  8/13/2012    Procedure: COLONOSCOPY;  Colonoscopy  ;  Surgeon: Marcelo Briscoe MD, MD;  Location:  GI     EXCISE MASS FOOT Left 7/12/2016    Procedure: EXCISE MASS FOOT;  Surgeon: Marcelo Vaughn DPM;  Location: [facility] NASAL/SINUS SCOPE W THER INSTILL  2005     SLING TRANSPUBO WITHOUT ANTERIOR COLPORRHAPHY  11/21/2011    Procedure:SLING TRANSPUBO WITHOUT ANTERIOR COLPORRHAPHY; Pubovaginal Sling; Surgeon:KENNETH NEGRO; Location: OR        Family History   Problem Relation Age of Onset     Unknown/Adopted Father      Coronary Artery Disease Father      Cerebrovascular Disease  Father      Prostate Cancer Father      Depression Father      Cerebrovascular Disease Maternal Grandmother      Neurologic Disorder Maternal Grandmother         stroke,  in her 50s     Cerebrovascular Disease Paternal Grandfather      Neurologic Disorder Paternal Grandfather         stroke     Cancer Paternal Grandmother         Unsure what type of cancer     Hyperlipidemia Sister      Breast Cancer Sister        Social History     Socioeconomic History     Marital status:      Spouse name: Not on file     Number of children: 3     Years of education: Not on file     Highest education level: Not on file   Occupational History     Occupation:      Employer: [employer]   Tobacco Use     Smoking status: Never Smoker     Smokeless tobacco: Never Used   Vaping Use     Vaping Use: Never used   Substance and Sexual Activity     Alcohol use: Yes     Alcohol/week: 0.0 - 1.0 standard drinks     Comment: occ.     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   Other Topics Concern     Parent/sibling w/ CABG, MI or angioplasty before 65F 55M? No   Social History Narrative     Not on file     Social Determinants of Health     Financial Resource Strain: Not on file   Food Insecurity: Not on file   Transportation Needs: Not on file   Physical Activity: Not on file   Stress: Not on file   Social Connections: Not on file   Intimate Partner Violence: Not on file   Housing Stability: Not on file       Outpatient Encounter Medications as of 2022   Medication Sig Dispense Refill     budesonide (RINOCORT AQUA) 32 MCG/ACT nasal spray Spray 1 spray into both nostrils daily       buPROPion (WELLBUTRIN XL) 150 MG 24 hr tablet TAKE 1 TABLET(150 MG) BY MOUTH EVERY MORNING 90 tablet 0     cetirizine (ZYRTEC) 10 MG tablet Take 1 tablet by mouth every evening. 90 tablet 3     cholecalciferol (VITAMIN D3) 25 mcg (1000 units) capsule Take 1 capsule  by mouth daily       folic acid 800 MCG tablet Take 800 mcg by mouth       hydroxychloroquine (PLAQUENIL) 200 MG tablet Take 1 tablet (200 mg) by mouth 2 times daily 180 tablet 0     levothyroxine (SYNTHROID/LEVOTHROID) 88 MCG tablet TAKE 1 TABLET(75 MCG) BY MOUTH DAILY 90 tablet 1     meloxicam (MOBIC) 7.5 MG tablet Take 1 tablet p.o. twice daily or 2 tabs p.o. daily, prn. Take with food. 60 tablet 1     methotrexate 2.5 MG tablet TAKE 3 TABLETS BY MOUTH ONCE WEEKLY 36 tablet 0     No facility-administered encounter medications on file as of 4/18/2022.       Review Of Systems:  Skin: spots  Eyes: negative  Ears/Nose/Throat: negative  Respiratory: No shortness of breath, dyspnea on exertion, cough, or hemoptysis  Cardiovascular: negative  Gastrointestinal: negative  Genitourinary: negative  Musculoskeletal: negative  Neurologic: negative  Psychiatric: negative  Hematologic/Lymphatic/Immunologic: negative  Endocrine: negative      Objective:     BP (!) 160/83   Pulse 64   LMP 10/13/2014 (Approximate)   SpO2 98%   Breastfeeding No   Eyes: Conjunctivae/lids: Normal   ENT: Lips:  Normal  MSK: Normal  Cardiovascular: Peripheral edema none  Pulm: Breathing Normal  Neuro/Psych: Orientation: A/O x 3. Normal; Mood/Affect: Normal, NAD, WDWN  Pt accompanied by: self  Following areas examined: face, neck, back, chest. Pt wearing masks.  Reyna skin type:ii   Findings:  Inflamed brown, stuck-on scaly appearing papules on neckline x 7, back x 1, right flank x 1, chest x 1  Inflamed flesh-colored smooth pedunculated papules on neckline x 15  Tan WD smooth macules on trunk  Red smooth well-defined macules on trunk   .Brown, stuck-on scaly appearing papules on trunk    Assessment and Plan:     1) Lentigines,cherry angiomas, and seborrheic keratoses    Treatment of these lesions would be purely cosmetic and not medically neccessary.  Lesion may recur and/or may not completely resolve. May need additional treatment.   Different removal options including excision, cryotherapy, cautery and /or laser.      2) ISK x 10  Benign etiology and course of lesion.  LN2: Treated with LN2 for 5s for 1-2 cycles. Warned risks of blistering, pain, pigment change, scarring, and incomplete resolution.  Advised patient to return if lesions do not completely resolve within 2-3 months.  Wound care sheet given.    3) inflamed skin tag x 15  LN2: Treated with LN2 for 5s for 1-2 cycles. Warned risks of blistering, pain, pigment change, scarring, and incomplete resolution.  Advised patient to return if lesions do not completely resolve within 2-3 months.  Wound care sheet given.      Treatment of these lesions would be purely cosmetic and not medically neccessary.  Lesion may recur and/or may not completely resolve. May need additional treatment.  Different removal options including excision, cryotherapy, cautery and /or laser.          Reviewed pertinent charts and labs prior to office visit.   Signs and Symptoms of non-melanoma skin cancer and ABCDEs.  Patient was asked about new or changing moles/lesions on body.   Wear a sunscreen with at least SPF 30 on your face, ears, neck and V of the chest daily. Wear sunscreen on other areas of the body if those areas are exposed to the sun throughout the day. Sunscreens can contain physical and/or chemical blockers. Physical blockers are less likely to clog pores, these include zinc oxide and titanium dioxide. Reapply every two hour and after swimming.Sunscreen examples: https://www.ewg.org/sunscreen/    Proper skin care from [facility]:    -Eliminate harsh soaps as they strip the natural oils from the skin, often resulting in dry itchy skin ( i.e. Dial, Zest, Arleen Spring)  -Use mild soaps such as Cetaphil or Dove Sensitive Skin in the shower. You do not need to use soap on arms, legs, and trunk every time you shower unless visibly soiled.   -Avoid hot or cold showers.  -After showering, lightly dry  off and apply moisturizing within 2-3 minutes. This will help trap moisture in the skin.   -Aggressive use of a moisturizer at least 1-2 times a day to the entire body (including -Vanicream, Cetaphil, Aquaphor or Cerave) and moisturize hands after every washing.  -We recommend using moisturizers that come in a tub that needs to be scooped out, not a pump. This has more of an oil base. It will hold moisture in your skin much better than a water base moisturizer. The above recommended are non-pore clogging.         It was a pleasure speaking with Mae Connors today.       Follow up in prn

## 2022-04-18 NOTE — LETTER
4/18/2022         RE: Mae Connors  [address]  [facility] [address]        Dear Colleague,    Thank you for referring your patient, Mae Connors, to the [facility]. Please see a copy of my visit note below.    HPI:  I was asked to see pt by Evangelista Vasquez PA-C. Mae Connors is a 62 year old female patient here today for bothersome spots on back and right flank .  Patient states this has been present for a while.  Patient reports the following symptoms: itchy, scaly .  Patient reports the following previous treatments: none.  Patient reports the following modifying factors: none.  Associated symptoms: none. Also has some very irritating/bothersome spots on neck line.  Patient has no other skin complaints today.  Remainder of the HPI, Meds, PMH, Allergies, FH, and SH was reviewed in chart.    Pertinent Hx:   No personal or family history of skin cancer    Past Medical History:   Diagnosis Date     Allergic rhinitis, cause unspecified 1990    Hx of immunotherapy , failed     Arthritis July 2010    rheumatoid and osteo     Depressive disorder     In the past     Family history of breast cancer in female     Sister BRCA pos but patient tested negative 7/2013     Gout 12/21/2012     Renal disease     incontinence     Thyroid disease     hypothyroid     Unsatisfactory cervical Papanicolaou smear 10/22/2020    10/22/20       Past Surgical History:   Procedure Laterality Date     BIOPSY  October 1, 2015    Skin - negative     CARPAL TUNNEL RELEASE RT/LT Right 09/23/2021     COLONOSCOPY  8-13-12    Dr. Marcelo Briscoe at [facility]     COLONOSCOPY  8/13/2012    Procedure: COLONOSCOPY;  Colonoscopy  ;  Surgeon: Marcelo Briscoe MD, MD;  Location:  GI     EXCISE MASS FOOT Left 7/12/2016    Procedure: EXCISE MASS FOOT;  Surgeon: Marcelo Vaughn DPM;  Location: [facility] NASAL/SINUS SCOPE W THER INSTILL  2005     SLING TRANSPUBO WITHOUT ANTERIOR COLPORRHAPHY  11/21/2011     Procedure:SLING TRANSPUBO WITHOUT ANTERIOR COLPORRHAPHY; Pubovaginal Sling; Surgeon:KENNETH NEGRO; Location:[facility] OR        Family History   Problem Relation Age of Onset     Unknown/Adopted Father      Coronary Artery Disease Father      Cerebrovascular Disease Father      Prostate Cancer Father      Depression Father      Cerebrovascular Disease Maternal Grandmother      Neurologic Disorder Maternal Grandmother         stroke,  in her 50s     Cerebrovascular Disease Paternal Grandfather      Neurologic Disorder Paternal Grandfather         stroke     Cancer Paternal Grandmother         Unsure what type of cancer     Hyperlipidemia Sister      Breast Cancer Sister        Social History     Socioeconomic History     Marital status:      Spouse name: Not on file     Number of children: 3     Years of education: Not on file     Highest education level: Not on file   Occupational History     Occupation:      Employer: [employer]   Tobacco Use     Smoking status: Never Smoker     Smokeless tobacco: Never Used   Vaping Use     Vaping Use: Never used   Substance and Sexual Activity     Alcohol use: Yes     Alcohol/week: 0.0 - 1.0 standard drinks     Comment: occ.     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   Other Topics Concern     Parent/sibling w/ CABG, MI or angioplasty before 65F 55M? No   Social History Narrative     Not on file     Social Determinants of Health     Financial Resource Strain: Not on file   Food Insecurity: Not on file   Transportation Needs: Not on file   Physical Activity: Not on file   Stress: Not on file   Social Connections: Not on file   Intimate Partner Violence: Not on file   Housing Stability: Not on file       Outpatient Encounter Medications as of 2022   Medication Sig Dispense Refill     budesonide (RINOCORT AQUA) 32 MCG/ACT nasal spray Spray 1 spray into both nostrils daily        buPROPion (WELLBUTRIN XL) 150 MG 24 hr tablet TAKE 1 TABLET(150 MG) BY MOUTH EVERY MORNING 90 tablet 0     cetirizine (ZYRTEC) 10 MG tablet Take 1 tablet by mouth every evening. 90 tablet 3     cholecalciferol (VITAMIN D3) 25 mcg (1000 units) capsule Take 1 capsule by mouth daily       folic acid 800 MCG tablet Take 800 mcg by mouth       hydroxychloroquine (PLAQUENIL) 200 MG tablet Take 1 tablet (200 mg) by mouth 2 times daily 180 tablet 0     levothyroxine (SYNTHROID/LEVOTHROID) 88 MCG tablet TAKE 1 TABLET(75 MCG) BY MOUTH DAILY 90 tablet 1     meloxicam (MOBIC) 7.5 MG tablet Take 1 tablet p.o. twice daily or 2 tabs p.o. daily, prn. Take with food. 60 tablet 1     methotrexate 2.5 MG tablet TAKE 3 TABLETS BY MOUTH ONCE WEEKLY 36 tablet 0     No facility-administered encounter medications on file as of 4/18/2022.       Review Of Systems:  Skin: spots  Eyes: negative  Ears/Nose/Throat: negative  Respiratory: No shortness of breath, dyspnea on exertion, cough, or hemoptysis  Cardiovascular: negative  Gastrointestinal: negative  Genitourinary: negative  Musculoskeletal: negative  Neurologic: negative  Psychiatric: negative  Hematologic/Lymphatic/Immunologic: negative  Endocrine: negative      Objective:     BP (!) 160/83   Pulse 64   LMP 10/13/2014 (Approximate)   SpO2 98%   Breastfeeding No   Eyes: Conjunctivae/lids: Normal   ENT: Lips:  Normal  MSK: Normal  Cardiovascular: Peripheral edema none  Pulm: Breathing Normal  Neuro/Psych: Orientation: A/O x 3. Normal; Mood/Affect: Normal, NAD, WDWN  Pt accompanied by: self  Following areas examined: face, neck, back, chest. Pt wearing masks.  Reyna skin type:ii   Findings:  Inflamed brown, stuck-on scaly appearing papules on neckline x 7, back x 1, right flank x 1, chest x 1  Inflamed flesh-colored smooth pedunculated papules on neckline x 15  Tan WD smooth macules on trunk  Red smooth well-defined macules on trunk   .Brown, stuck-on scaly appearing papules on  trunk    Assessment and Plan:     1) Lentigines,cherry angiomas, and seborrheic keratoses    Treatment of these lesions would be purely cosmetic and not medically neccessary.  Lesion may recur and/or may not completely resolve. May need additional treatment.  Different removal options including excision, cryotherapy, cautery and /or laser.      2) ISK x 10  Benign etiology and course of lesion.  LN2: Treated with LN2 for 5s for 1-2 cycles. Warned risks of blistering, pain, pigment change, scarring, and incomplete resolution.  Advised patient to return if lesions do not completely resolve within 2-3 months.  Wound care sheet given.    3) inflamed skin tag x 15  LN2: Treated with LN2 for 5s for 1-2 cycles. Warned risks of blistering, pain, pigment change, scarring, and incomplete resolution.  Advised patient to return if lesions do not completely resolve within 2-3 months.  Wound care sheet given.      Treatment of these lesions would be purely cosmetic and not medically neccessary.  Lesion may recur and/or may not completely resolve. May need additional treatment.  Different removal options including excision, cryotherapy, cautery and /or laser.          Reviewed pertinent charts and labs prior to office visit.   Signs and Symptoms of non-melanoma skin cancer and ABCDEs.  Patient was asked about new or changing moles/lesions on body.   Wear a sunscreen with at least SPF 30 on your face, ears, neck and V of the chest daily. Wear sunscreen on other areas of the body if those areas are exposed to the sun throughout the day. Sunscreens can contain physical and/or chemical blockers. Physical blockers are less likely to clog pores, these include zinc oxide and titanium dioxide. Reapply every two hour and after swimming.Sunscreen examples: https://www.ewg.org/sunscreen/    Proper skin care from [facility]:    -Eliminate harsh soaps as they strip the natural oils from the skin, often resulting in dry itchy skin (  i.e. Dial, Zest, [place])  -Use mild soaps such as Cetaphil or Dove Sensitive Skin in the shower. You do not need to use soap on arms, legs, and trunk every time you shower unless visibly soiled.   -Avoid hot or cold showers.  -After showering, lightly dry off and apply moisturizing within 2-3 minutes. This will help trap moisture in the skin.   -Aggressive use of a moisturizer at least 1-2 times a day to the entire body (including -Vanicream, Cetaphil, Aquaphor or Cerave) and moisturize hands after every washing.  -We recommend using moisturizers that come in a tub that needs to be scooped out, not a pump. This has more of an oil base. It will hold moisture in your skin much better than a water base moisturizer. The above recommended are non-pore clogging.         It was a pleasure speaking with Mae Connors today.       Follow up in prn        Again, thank you for allowing me to participate in the care of your patient.        Sincerely,        Cathy Rizo PA-C

## 2022-04-18 NOTE — PATIENT INSTRUCTIONS
WOUND CARE INSTRUCTIONS  FOR CRYOSURGERY  For questions please call [phone]        This area treated with liquid nitrogen will form a blister. You do not need to bandage the area until after the blister forms and breaks (which may be a few days).  When the blister breaks, begin daily dressing changes as follows:    1) Clean and dry the area with tap water using clean Q-tip or sterile gauze pad.    2) Apply Vaseline or Aquaphor over entire wound. Other options include Polysporin ointment or Bacitracin ointment over entire wound.  Do NOT use Neosporin ointment.    3) Cover the wound with a band-aid or sterile non-stick gauze pad and micropore paper tape.      REPEAT THESE INSTRUCTIONS AT LEAST ONCE A DAY UNTIL THE WOUND HAS COMPLETELY HEALED.        It is an old wives tale that a wound heals better when it is exposed to air and allowed to dry out. The wound will heal faster with a better cosmetic result if it is kept moist with ointment and covered with a bandage.  Do not let the wound dry out.      Supplies Needed:     *Cotton tipped applicators (Q-tips)   *Polysporin ointment or Bacitracin ointment (NOT NEOSPORIN)   *Band-aids, or non stick gauze pads and micropore paper tape    PATIENT INFORMATION    During the healing process you will notice a number of changes. All wounds develop a small halo of redness surrounding the wound.  This means healing is occurring. Severe itching with extensive redness usually indicates sensitivity to the ointment or bandage tape used to dress the wound.  You should call our office if this develops.      Swelling and/or discoloration around your surgical site is common, particularly when performed around the eye.    All wounds normally drain.  The larger the wound the more drainage there will be.  After 7-10 days, you will notice the wound beginning to shrink and new skin will begin to grow.  The wound is healed when you can see skin has formed over the entire area.  A healed  wound has a healthy, shiny look to the surface and is red to dark pink in color to normalize.  Wounds may take approximately 4-6 weeks to heal.  Larger wounds may take 6-8 weeks.  After the wound is healed you may discontinue dressing changes.    You may experience a sensation of tightness as your wound heals. This is normal and will gradually subside.    Your healed wound may be sensitive to temperature changes. This sensitivity improves with time, but if you re having a lot of discomfort, try to avoid temperature extremes.    Patients frequently experience itching after their wound appears to have healed because of the continue healing under the skin.  Plain Vaseline will help relieve the itching.

## 2022-04-20 DIAGNOSIS — M05.741 RHEUMATOID ARTHRITIS INVOLVING BOTH HANDS WITH POSITIVE RHEUMATOID FACTOR (H): ICD-10-CM

## 2022-04-20 DIAGNOSIS — M05.742 RHEUMATOID ARTHRITIS INVOLVING BOTH HANDS WITH POSITIVE RHEUMATOID FACTOR (H): ICD-10-CM

## 2022-04-27 ENCOUNTER — LAB (OUTPATIENT)
Dept: LAB | Facility: CLINIC | Age: 62
End: 2022-04-27
Payer: COMMERCIAL

## 2022-04-27 DIAGNOSIS — Z79.899 HIGH RISK MEDICATION USE: ICD-10-CM

## 2022-04-27 DIAGNOSIS — M05.9 SEROPOSITIVE RHEUMATOID ARTHRITIS (H): ICD-10-CM

## 2022-04-27 LAB
ALBUMIN SERPL-MCNC: 3.7 G/DL (ref 3.4–5)
ALT SERPL W P-5'-P-CCNC: 33 U/L (ref 0–50)
AST SERPL W P-5'-P-CCNC: 24 U/L (ref 0–45)
BASOPHILS # BLD AUTO: 0 10E3/UL (ref 0–0.2)
BASOPHILS NFR BLD AUTO: 1 %
CREAT SERPL-MCNC: 0.85 MG/DL (ref 0.52–1.04)
EOSINOPHIL # BLD AUTO: 0.2 10E3/UL (ref 0–0.7)
EOSINOPHIL NFR BLD AUTO: 4 %
ERYTHROCYTE [DISTWIDTH] IN BLOOD BY AUTOMATED COUNT: 13.8 % (ref 10–15)
GFR SERPL CREATININE-BSD FRML MDRD: 78 ML/MIN/1.73M2
HCT VFR BLD AUTO: 36.4 % (ref 35–47)
HGB BLD-MCNC: 11.8 G/DL (ref 11.7–15.7)
LYMPHOCYTES # BLD AUTO: 1.8 10E3/UL (ref 0.8–5.3)
LYMPHOCYTES NFR BLD AUTO: 44 %
MCH RBC QN AUTO: 29.6 PG (ref 26.5–33)
MCHC RBC AUTO-ENTMCNC: 32.4 G/DL (ref 31.5–36.5)
MCV RBC AUTO: 91 FL (ref 78–100)
MONOCYTES # BLD AUTO: 0.3 10E3/UL (ref 0–1.3)
MONOCYTES NFR BLD AUTO: 7 %
NEUTROPHILS # BLD AUTO: 1.8 10E3/UL (ref 1.6–8.3)
NEUTROPHILS NFR BLD AUTO: 44 %
PLATELET # BLD AUTO: 264 10E3/UL (ref 150–450)
RBC # BLD AUTO: 3.99 10E6/UL (ref 3.8–5.2)
WBC # BLD AUTO: 4.2 10E3/UL (ref 4–11)

## 2022-04-27 PROCEDURE — 84450 TRANSFERASE (AST) (SGOT): CPT

## 2022-04-27 PROCEDURE — 82565 ASSAY OF CREATININE: CPT

## 2022-04-27 PROCEDURE — 84460 ALANINE AMINO (ALT) (SGPT): CPT

## 2022-04-27 PROCEDURE — 82040 ASSAY OF SERUM ALBUMIN: CPT

## 2022-04-27 PROCEDURE — 85025 COMPLETE CBC W/AUTO DIFF WBC: CPT

## 2022-04-27 PROCEDURE — 36415 COLL VENOUS BLD VENIPUNCTURE: CPT

## 2022-05-18 ENCOUNTER — E-VISIT (OUTPATIENT)
Dept: URGENT CARE | Facility: CLINIC | Age: 62
End: 2022-05-18
Payer: COMMERCIAL

## 2022-05-18 DIAGNOSIS — N39.0 ACUTE UTI (URINARY TRACT INFECTION): Primary | ICD-10-CM

## 2022-05-18 PROCEDURE — 99421 OL DIG E/M SVC 5-10 MIN: CPT | Performed by: NURSE PRACTITIONER

## 2022-05-18 RX ORDER — NITROFURANTOIN 25; 75 MG/1; MG/1
100 CAPSULE ORAL 2 TIMES DAILY
Qty: 10 CAPSULE | Refills: 0 | Status: SHIPPED | OUTPATIENT
Start: 2022-05-18 | End: 2022-05-23

## 2022-05-18 NOTE — PATIENT INSTRUCTIONS
Dear Mae Connors    After reviewing your responses, I've been able to diagnose you with a urinary tract infection, which is a common infection of the bladder with bacteria.  This is not a sexually transmitted infection, though urinating immediately after intercourse can help prevent infections.  Drinking lots of fluids is also helpful to clear your current infection and prevent the next one.      I have sent a prescription for antibiotics to your pharmacy to treat this infection.    It is important that you take all of your prescribed medication even if your symptoms are improving after a few doses.  Taking all of your medicine helps prevent the symptoms from returning.     If your symptoms worsen, you develop pain in your back or stomach, develop fevers, or are not improving in 5 days, please contact your primary care provider for an appointment or visit any of our convenient Walk-in or Urgent Care Centers to be seen, which can be found on our website here.    Thanks again for choosing us as your health care partner,    MIKAYLA Weaver CNP

## 2022-06-09 ENCOUNTER — VIRTUAL VISIT (OUTPATIENT)
Dept: RHEUMATOLOGY | Facility: CLINIC | Age: 62
End: 2022-06-09
Payer: COMMERCIAL

## 2022-06-09 DIAGNOSIS — Z79.899 HIGH RISK MEDICATION USE: ICD-10-CM

## 2022-06-09 DIAGNOSIS — M05.741 RHEUMATOID ARTHRITIS INVOLVING BOTH HANDS WITH POSITIVE RHEUMATOID FACTOR (H): Primary | ICD-10-CM

## 2022-06-09 DIAGNOSIS — M05.742 RHEUMATOID ARTHRITIS INVOLVING BOTH HANDS WITH POSITIVE RHEUMATOID FACTOR (H): Primary | ICD-10-CM

## 2022-06-09 PROCEDURE — 99214 OFFICE O/P EST MOD 30 MIN: CPT | Mod: 95 | Performed by: INTERNAL MEDICINE

## 2022-06-09 NOTE — PATIENT INSTRUCTIONS
Summary of Your Rheumatology Visit    Next Appointment:   4 Months    Medications:    Please follow directives on pill bottle on how to take medication(s) provided.    Referrals:      Tests:     Please have labs performed in about 6 weeks.    Injections:        Other:

## 2022-06-09 NOTE — PROGRESS NOTES
This encounter was opened in error. Please disregard-Pt was scheduled with rheumatology and added to nurse schedule on the same day. Shingrix adminitstered and documented via the rheumatology encounter. Disregard this note.     Stephen Armstrong CMA ([facility])    Here for annual physical exam personal history family history surgical history hospitalization reviewed and reconciled no significant change since last time patient is a hypertension and hyperlipidemia and diabetes she ran out of medicine so we will give the medicine and recheck the labs in 4 to 6 weeks  Patient has a colonoscopy done before/Pap smear 2021 Will order mammogram today/has up to date COVID shot and flu shot//and the shingles shot/

## 2022-07-13 DIAGNOSIS — M05.9 SEROPOSITIVE RHEUMATOID ARTHRITIS (H): ICD-10-CM

## 2022-07-13 RX ORDER — HYDROXYCHLOROQUINE SULFATE 200 MG/1
200 TABLET, FILM COATED ORAL 2 TIMES DAILY
Qty: 180 TABLET | Refills: 0 | Status: SHIPPED | OUTPATIENT
Start: 2022-07-13 | End: 2022-09-28

## 2022-07-13 NOTE — TELEPHONE ENCOUNTER
Last ov:    hydroxychloroquine 200 mg    Last ov 6/9/22  Last lab 4/27/22  Next ov 9/28/22 w/ dr kinsey

## 2022-08-04 DIAGNOSIS — E03.8 OTHER SPECIFIED HYPOTHYROIDISM: ICD-10-CM

## 2022-08-08 ENCOUNTER — E-VISIT (OUTPATIENT)
Dept: URGENT CARE | Facility: CLINIC | Age: 62
End: 2022-08-08
Payer: COMMERCIAL

## 2022-08-08 DIAGNOSIS — N39.0 ACUTE UTI (URINARY TRACT INFECTION): Primary | ICD-10-CM

## 2022-08-08 PROCEDURE — 99421 OL DIG E/M SVC 5-10 MIN: CPT | Performed by: FAMILY MEDICINE

## 2022-08-08 RX ORDER — NITROFURANTOIN 25; 75 MG/1; MG/1
100 CAPSULE ORAL 2 TIMES DAILY
Qty: 10 CAPSULE | Refills: 0 | Status: SHIPPED | OUTPATIENT
Start: 2022-08-08 | End: 2022-08-13

## 2022-08-08 NOTE — PATIENT INSTRUCTIONS
Dear Mae Connors    After reviewing your responses, I've been able to diagnose you with a urinary tract infection, which is a common infection of the bladder with bacteria.  This is not a sexually transmitted infection, though urinating immediately after intercourse can help prevent infections.  Drinking lots of fluids is also helpful to clear your current infection and prevent the next one.      I have sent a prescription for antibiotics to your pharmacy to treat this infection.    It is important that you take all of your prescribed medication even if your symptoms are improving after a few doses.  Taking all of your medicine helps prevent the symptoms from returning.     If your symptoms worsen, you develop pain in your back or stomach, develop fevers, or are not improving in 5 days, please contact your primary care provider for an appointment or visit any of our convenient Walk-in or Urgent Care Centers to be seen, which can be found on our website here.    Thanks again for choosing us as your health care partner,    Haylee Campo MD

## 2022-08-09 DIAGNOSIS — E03.8 OTHER SPECIFIED HYPOTHYROIDISM: ICD-10-CM

## 2022-08-09 RX ORDER — LEVOTHYROXINE SODIUM 88 UG/1
TABLET ORAL
Qty: 30 TABLET | Refills: 0 | Status: SHIPPED | OUTPATIENT
Start: 2022-08-09 | End: 2022-08-22

## 2022-08-09 NOTE — TELEPHONE ENCOUNTER
Last TSH abnormal.  Will need labs.  One month refill- please call patient to help set up lab appointment.    Also update PHQ    ajp

## 2022-08-15 RX ORDER — LEVOTHYROXINE SODIUM 88 UG/1
TABLET ORAL
Qty: 90 TABLET | OUTPATIENT
Start: 2022-08-15

## 2022-08-15 NOTE — TELEPHONE ENCOUNTER
Pt needs to have a lab only appointment for further refills. Was given 30 day refill.     Gisell Garces RN, BSN, PHN  [facility]

## 2022-08-16 ENCOUNTER — LAB (OUTPATIENT)
Dept: LAB | Facility: CLINIC | Age: 62
End: 2022-08-16
Payer: COMMERCIAL

## 2022-08-16 DIAGNOSIS — E03.8 OTHER SPECIFIED HYPOTHYROIDISM: ICD-10-CM

## 2022-08-16 LAB
T4 FREE SERPL-MCNC: 1.17 NG/DL (ref 0.76–1.46)
TSH SERPL DL<=0.005 MIU/L-ACNC: 4.12 MU/L (ref 0.4–4)

## 2022-08-16 PROCEDURE — 84439 ASSAY OF FREE THYROXINE: CPT

## 2022-08-16 PROCEDURE — 84443 ASSAY THYROID STIM HORMONE: CPT

## 2022-08-16 PROCEDURE — 36415 COLL VENOUS BLD VENIPUNCTURE: CPT

## 2022-08-22 RX ORDER — LEVOTHYROXINE SODIUM 88 UG/1
TABLET ORAL
Qty: 90 TABLET | Refills: 3 | Status: SHIPPED | OUTPATIENT
Start: 2022-08-22 | End: 2022-10-25

## 2022-09-05 ENCOUNTER — E-VISIT (OUTPATIENT)
Dept: URGENT CARE | Facility: CLINIC | Age: 62
End: 2022-09-05
Payer: COMMERCIAL

## 2022-09-05 DIAGNOSIS — N39.0 ACUTE UTI (URINARY TRACT INFECTION): Primary | ICD-10-CM

## 2022-09-05 PROCEDURE — 99421 OL DIG E/M SVC 5-10 MIN: CPT | Performed by: EMERGENCY MEDICINE

## 2022-09-05 RX ORDER — NITROFURANTOIN 25; 75 MG/1; MG/1
100 CAPSULE ORAL 2 TIMES DAILY
Qty: 10 CAPSULE | Refills: 0 | Status: SHIPPED | OUTPATIENT
Start: 2022-09-05 | End: 2022-09-10

## 2022-09-05 NOTE — PATIENT INSTRUCTIONS
Dear Mae Connors    After reviewing your responses, I've been able to diagnose you with a urinary tract infection, which is a common infection of the bladder with bacteria.  This is not a sexually transmitted infection, though urinating immediately after intercourse can help prevent infections.  Drinking lots of fluids is also helpful to clear your current infection and prevent the next one.      I have sent a prescription for antibiotics to your pharmacy to treat this infection.    It is important that you take all of your prescribed medication even if your symptoms are improving after a few doses.  Taking all of your medicine helps prevent the symptoms from returning.     If your symptoms worsen, you develop pain in your back or stomach, develop fevers, or are not improving in 5 days, please contact your primary care provider for an appointment or visit any of our convenient Walk-in or Urgent Care Centers to be seen, which can be found on our website here.    Thanks again for choosing us as your health care partner,    Mina Celaya MD    Urinary Tract Infections in Women  Urinary tract infections (UTIs) are most often caused by bacteria. These bacteria enter the urinary tract. The bacteria may come from inside the body. Or they may travel from the skin outside the rectum or vagina into the urethra. Female anatomy makes it easy for bacteria from the bowel to enter a woman s urinary tract, which is the most common source of UTI. This means women develop UTIs more often than men. Pain in or around the urinary tract is a common UTI symptom. But the only way to know for sure if you have a UTI for the healthcare provider to test your urine. The two tests that may be done are the urinalysis and urine culture.     Types of UTIs    Cystitis. A bladder infection (cystitis) is the most common UTI in women. You may have urgent or frequent need to pee. You may also have pain, burning when you pee, and bloody  urine.    Urethritis. This is an inflamed urethra, which is the tube that carries urine from the bladder to outside the body. You may have lower stomach or back pain. You may also have urgent or frequent need to pee.    Pyelonephritis. This is a kidney infection. If not treated, it can be serious and damage your kidneys. In severe cases, you may need to stay in the hospital. You may have a fever and lower back pain.    Medicines to treat a UTI  Most UTIs are treated with antibiotics. These kill the bacteria. The length of time you need to take them depends on the type of infection. It may be as short as 3 days. If you have repeated UTIs, you may need a low-dose antibiotic for several months. Take antibiotics exactly as directed. Don t stop taking them until all of the medicine is gone. If you stop taking the antibiotic too soon, the infection may not go away. You may also develop a resistance to the antibiotic. This can make it much harder to treat.   Lifestyle changes to treat and prevent UTIs   The lifestyle changes below will help get rid of your UTI. They may also help prevent future UTIs.     Drink plenty of fluids. This includes water, juice, or other caffeine-free drinks. Fluids help flush bacteria out of your body.    Empty your bladder. Always empty your bladder when you feel the urge to pee. And always pee before going to sleep. Urine that stays in your bladder can lead to infection. Try to pee before and after sex as well.    Practice good personal hygiene. Wipe yourself from front to back after using the toilet. This helps keep bacteria from getting into the urethra.    Use condoms during sex. These help prevent UTIs caused by sexually transmitted bacteria. Also don't use spermicides during sex. These can increase the risk for UTIs. Choose other forms of birth control instead. For women who tend to get UTIs after sex, a low-dose of a preventive antibiotic may be used. Be sure to discuss this option with  your healthcare provider.    Follow up with your healthcare provider as directed. He or she may test to make sure the infection has cleared. If needed, more treatment may be started.  Mario Alberto last reviewed this educational content on 7/1/2019 2000-2021 [employer]. All rights reserved. This information is not intended as a substitute for professional medical care. Always follow your healthcare professional's instructions.

## 2022-09-23 ENCOUNTER — TRANSFERRED RECORDS (OUTPATIENT)
Dept: HEALTH INFORMATION MANAGEMENT | Facility: CLINIC | Age: 62
End: 2022-09-23

## 2022-09-28 ENCOUNTER — OFFICE VISIT (OUTPATIENT)
Dept: RHEUMATOLOGY | Facility: CLINIC | Age: 62
End: 2022-09-28

## 2022-09-28 ENCOUNTER — LAB (OUTPATIENT)
Dept: LAB | Facility: CLINIC | Age: 62
End: 2022-09-28
Payer: COMMERCIAL

## 2022-09-28 VITALS
HEART RATE: 68 BPM | WEIGHT: 186.8 LBS | DIASTOLIC BLOOD PRESSURE: 80 MMHG | SYSTOLIC BLOOD PRESSURE: 134 MMHG | BODY MASS INDEX: 34.17 KG/M2

## 2022-09-28 DIAGNOSIS — M05.9 SEROPOSITIVE RHEUMATOID ARTHRITIS (H): Primary | ICD-10-CM

## 2022-09-28 DIAGNOSIS — Z79.899 HIGH RISK MEDICATION USE: ICD-10-CM

## 2022-09-28 DIAGNOSIS — M25.50 POLYARTHRALGIA: ICD-10-CM

## 2022-09-28 DIAGNOSIS — M05.9 SEROPOSITIVE RHEUMATOID ARTHRITIS (H): ICD-10-CM

## 2022-09-28 DIAGNOSIS — M15.0 PRIMARY OSTEOARTHRITIS INVOLVING MULTIPLE JOINTS: ICD-10-CM

## 2022-09-28 DIAGNOSIS — R76.8 ANTI-CYCLIC CITRULLINATED PEPTIDE ANTIBODY POSITIVE: ICD-10-CM

## 2022-09-28 LAB
ALBUMIN SERPL BCG-MCNC: 4.5 G/DL (ref 3.5–5.2)
ALT SERPL W P-5'-P-CCNC: 38 U/L (ref 10–35)
CREAT SERPL-MCNC: 0.77 MG/DL (ref 0.51–0.95)
ERYTHROCYTE [DISTWIDTH] IN BLOOD BY AUTOMATED COUNT: 13.6 % (ref 10–15)
GFR SERPL CREATININE-BSD FRML MDRD: 88 ML/MIN/1.73M2
HCT VFR BLD AUTO: 36.5 % (ref 35–47)
HGB BLD-MCNC: 12.1 G/DL (ref 11.7–15.7)
MCH RBC QN AUTO: 29.8 PG (ref 26.5–33)
MCHC RBC AUTO-ENTMCNC: 33.2 G/DL (ref 31.5–36.5)
MCV RBC AUTO: 90 FL (ref 78–100)
PLATELET # BLD AUTO: 302 10E3/UL (ref 150–450)
RBC # BLD AUTO: 4.06 10E6/UL (ref 3.8–5.2)
WBC # BLD AUTO: 5 10E3/UL (ref 4–11)

## 2022-09-28 PROCEDURE — 85027 COMPLETE CBC AUTOMATED: CPT

## 2022-09-28 PROCEDURE — 99214 OFFICE O/P EST MOD 30 MIN: CPT | Performed by: INTERNAL MEDICINE

## 2022-09-28 PROCEDURE — 36415 COLL VENOUS BLD VENIPUNCTURE: CPT

## 2022-09-28 PROCEDURE — 82040 ASSAY OF SERUM ALBUMIN: CPT

## 2022-09-28 PROCEDURE — 84460 ALANINE AMINO (ALT) (SGPT): CPT

## 2022-09-28 PROCEDURE — 82565 ASSAY OF CREATININE: CPT

## 2022-09-28 RX ORDER — HYDROXYCHLOROQUINE SULFATE 200 MG/1
200 TABLET, FILM COATED ORAL 2 TIMES DAILY
Qty: 180 TABLET | Refills: 0 | Status: SHIPPED | OUTPATIENT
Start: 2022-09-28 | End: 2023-02-24

## 2022-09-28 NOTE — PROGRESS NOTES
This document was created using a software with less than 100% fidelity, at times resulting in unintended, even erroneous syntax and grammar.  The reader is advised to keep this under consideration while reviewing, interpreting this note.      Rheumatology Consult Note      Mae Connors     YOB: 1960 Age: 62 year old    Date of visit: 9/28/22    PCP: Evangelista Vasquez    Chief Complaint   Patient presents with:  RECHECK: KLARISSA      Assessment and Plan     Mae was seen today for recheck.    Diagnoses and all orders for this visit:    Seropositive rheumatoid arthritis (H)  -     hydroxychloroquine (PLAQUENIL) 200 MG tablet; Take 1 tablet (200 mg) by mouth 2 times daily  -     methotrexate 2.5 MG tablet; TAKE 3 TABLETS BY MOUTH ONCE WEEKLY  -     Albumin level; Standing  -     ALT; Standing  -     CBC with platelets; Standing  -     Creatinine; Standing    High risk medication use  -     Albumin level; Standing  -     ALT; Standing  -     CBC with platelets; Standing  -     Creatinine; Standing    Polyarthralgia  -     Albumin level; Standing  -     ALT; Standing  -     CBC with platelets; Standing  -     Creatinine; Standing    Primary osteoarthritis involving multiple joints    Anti-cyclic citrullinated peptide antibody positive    -         Return to clinic: 6 months      HPI   Mae Connors is a 62 year old female  is seen today for evaluation of and establishment of care for rheumatoid arthritis which is seropositive with high titers of anti-CCP antibody, as checked on 8/3/2016.  She is on hydroxychloroquine, a modest dose of methotrexate.  As she feels that this is done the trick for her.  She noted mild discomfort off and on noting it at 1.0/10.  She is able to do all her day-to-day activities without difficulty she has noted no stiffness in the morning.  She is due for labs to be done today I reiterated the need for labs every 3 months.  She had her eyes examined the other day.  She  has osteoarthritis and is well aware of the management principles.  She has taken meloxicam intermittently we discussed the interaction of meloxicam and methotrexate and the preference for taking acetaminophen.  She works as a  for the local [employer].  She grew up in [place] and was a till age 20.  Does not smoke.  Alcohol consumption moderate.  She describes herself otherwise in good health.,.           Active Problem List     Patient Active Problem List   Diagnosis     Allergic rhinitis     Female stress incontinence     Hypothyroidism     Mild major depression (H)     CARDIOVASCULAR SCREENING; LDL GOAL LESS THAN 160     Rheumatoid arthritis involving both hands with positive rheumatoid factor (H)     Unsatisfactory cervical Papanicolaou smear     Past Medical History     Past Medical History:   Diagnosis Date     Allergic rhinitis, cause unspecified 1990    Hx of immunotherapy , failed     Arthritis July 2010    rheumatoid and osteo     Depressive disorder     In the past     Family history of breast cancer in female     Sister BRCA pos but patient tested negative 7/2013     Gout 12/21/2012     Renal disease     incontinence     Thyroid disease     hypothyroid     Unsatisfactory cervical Papanicolaou smear 10/22/2020    10/22/20     Past Surgical History     Past Surgical History:   Procedure Laterality Date     BIOPSY  October 1, 2015    Skin - negative     CARPAL TUNNEL RELEASE RT/LT Right 09/23/2021     COLONOSCOPY  8-13-12    Dr. Marcelo Briscoe at [facility]     COLONOSCOPY  8/13/2012    Procedure: COLONOSCOPY;  Colonoscopy  ;  Surgeon: Marcelo Briscoe MD, MD;  Location:  GI     EXCISE MASS FOOT Left 7/12/2016    Procedure: EXCISE MASS FOOT;  Surgeon: Marcelo Vaughn DPM;  Location: [facility] NASAL/SINUS SCOPE W THER INSTILL  2005     SLING TRANSPUBO WITHOUT ANTERIOR COLPORRHAPHY  11/21/2011    Procedure:SLING TRANSPUBO WITHOUT ANTERIOR COLPORRHAPHY; Pubovaginal Sling;  Surgeon:KENNETH NEGRO; Location:[facility] OR     Allergy     Allergies   Allergen Reactions     Sulfa Drugs      Hives and trouble breathing     Current Medication List     Current Outpatient Medications   Medication Sig     buPROPion (WELLBUTRIN XL) 150 MG 24 hr tablet TAKE 1 TABLET(150 MG) BY MOUTH EVERY MORNING     cetirizine (ZYRTEC) 10 MG tablet Take 1 tablet by mouth every evening.     cholecalciferol (VITAMIN D3) 25 mcg (1000 units) capsule Take 1 capsule by mouth daily     folic acid 800 MCG tablet Take 800 mcg by mouth     hydroxychloroquine (PLAQUENIL) 200 MG tablet Take 1 tablet (200 mg) by mouth 2 times daily     levothyroxine (SYNTHROID/LEVOTHROID) 88 MCG tablet TAKE 1 TABLET BY MOUTH DAILY     meloxicam (MOBIC) 7.5 MG tablet Take 1 tablet p.o. twice daily or 2 tabs p.o. daily, prn. Take with food.     methotrexate 2.5 MG tablet TAKE 3 TABLETS BY MOUTH ONCE WEEKLY     budesonide (RINOCORT AQUA) 32 MCG/ACT nasal spray Spray 1 spray into both nostrils daily (Patient not taking: Reported on 2022)     No current facility-administered medications for this visit.            Family History     Family History   Problem Relation Age of Onset     Unknown/Adopted Father      Coronary Artery Disease Father      Cerebrovascular Disease Father      Prostate Cancer Father      Depression Father      Cerebrovascular Disease Maternal Grandmother      Neurologic Disorder Maternal Grandmother         stroke,  in her 50s     Cerebrovascular Disease Paternal Grandfather      Neurologic Disorder Paternal Grandfather         stroke     Cancer Paternal Grandmother         Unsure what type of cancer     Hyperlipidemia Sister      Breast Cancer Sister          Physical Exam     COMPREHENSIVE EXAMINATION:  Vitals:    22 1218   BP: 134/80   Pulse: 68   Weight: 84.7 kg (186 lb 12.8 oz)     A well appearing alert oriented female. Vital data as noted above. Her eyes examined for inflammation/scleromalacia. ENT examined  for oral mucositis, moisture, thrush, nasal deformity, external ear redness, deformity. Her neck is examined for suppleness and lymphadenopathy.  Cardiopulmonary examination without dyspnea at rest, use of accessory muscles of breathing, wheezing, edema, peripheral or central cyanosis.  Abdomen examined for softness, tenderness, obvious organomegaly.  Skin examined for heliotrope, malar area eruption, lupus pernio, periungual erythema, sclerodactyly, papules, erythema nodosum, purpura, nail pitting, onycholysis, and obvious psoriasis lesion. Neurological examination done for alertness, speech, facial symmetry,  tone and power in upper and lower extremities, and gait. The joint examination is performed for swelling, tenderness, warmth, erythema, and range of motion in the following joints: DIPs, PIPs, MCPs, wrists, first CMC's, elbows, shoulders, hips, knees, ankles, feet; spine for range of motion and paraspinal muscles for tenderness. The salient normal / abnormal findings are appended.  She does not have synovitis in any of the palpable joints she has early Heberden's, mild squaring of the first CMC's.  There is no sclerodactyly there is no periungual erythema and he does not have dactylitis of digits.    Labs / Imaging (select studies)     Rheumatoid Factor   Date Value Ref Range Status   06/17/2016 21 (H) <20 IU/mL Final     Uric Acid   Date Value Ref Range Status   12/05/2019 3.9 2.0 - 7.5 mg/dL Final      Hemoglobin   Date Value Ref Range Status   04/27/2022 11.8 11.7 - 15.7 g/dL Final   02/04/2022 11.8 11.7 - 15.7 g/dL Final   09/09/2021 12.4 11.7 - 15.7 g/dL Final   06/04/2021 11.3 (L) 11.7 - 15.7 g/dL Final     Comment:     Reviewed: OK with previous   02/25/2021 11.3 (L) 11.7 - 15.7 g/dL Final     Comment:     Results confirmed by repeat test   10/22/2020 12.1 11.7 - 15.7 g/dL Final     Urea Nitrogen   Date Value Ref Range Status   10/10/2017 18 7 - 30 mg/dL Final   07/30/2017 10 7 - 30 mg/dL Final    06/30/2017 16 7 - 30 mg/dL Final     Sed Rate   Date Value Ref Range Status   02/02/2018 18 0 - 30 mm/h Final   06/17/2016 33 (H) 0 - 30 mm/h Final     Erythrocyte Sedimentation Rate   Date Value Ref Range Status   12/05/2019 17 0 - 20 mm/hr Final     CRP Inflammation   Date Value Ref Range Status   06/17/2016 6.3 0.0 - 8.0 mg/L Final     CRP   Date Value Ref Range Status   12/05/2019 0.4 0.0 - 0.8 mg/dL Final     AST   Date Value Ref Range Status   04/27/2022 24 0 - 45 U/L Final   02/04/2022 20 0 - 45 U/L Final   09/09/2021 33 0 - 45 U/L Final   06/04/2021 24 0 - 45 U/L Final   02/25/2021 60 (H) 0 - 45 U/L Final   10/22/2020 28 0 - 45 U/L Final     Albumin   Date Value Ref Range Status   04/27/2022 3.7 3.4 - 5.0 g/dL Final   02/04/2022 3.3 (L) 3.4 - 5.0 g/dL Final   09/09/2021 3.8 3.4 - 5.0 g/dL Final   06/04/2021 3.7 3.4 - 5.0 g/dL Final   02/25/2021 3.8 3.4 - 5.0 g/dL Final   10/22/2020 3.8 3.4 - 5.0 g/dL Final     Alkaline Phosphatase   Date Value Ref Range Status   10/10/2017 90 40 - 150 U/L Final   08/03/2017 73 40 - 150 U/L Final   07/30/2017 82 40 - 150 U/L Final     ALT   Date Value Ref Range Status   04/27/2022 33 0 - 50 U/L Final   02/04/2022 29 0 - 50 U/L Final   09/09/2021 45 0 - 50 U/L Final   06/04/2021 26 0 - 50 U/L Final   02/25/2021 93 (H) 0 - 50 U/L Final   10/22/2020 37 0 - 50 U/L Final     Rheumatoid Factor   Date Value Ref Range Status   06/17/2016 21 (H) <20 IU/mL Final          Immunization History     Immunization History   Administered Date(s) Administered     COVID-19,PF,Pfizer (12+ Yrs) 03/11/2021, 04/01/2021, 10/25/2021     Influenza (H1N1) 12/29/2009     Influenza (IIV3) PF 02/19/2007, 11/30/2007, 11/05/2008, 09/23/2009, 10/29/2010, 09/20/2011, 08/31/2012, 10/04/2013     Influenza Quad, Recombinant, pf(RIV4) (Flublok) 01/11/2019, 10/25/2021     Influenza Vaccine IM > 6 months Valent IIV4 (Alfuria,Fluzone) 10/24/2014, 10/20/2015, 12/20/2016, 10/10/2017     Influenza,INJ,MDCK,PF,Quad  >6mo(Flucelvax-RMG) 09/16/2020     Pneumo Conj 13-V (2010&after) 10/10/2017     TD (ADULT, 7+) 06/01/2003     TDAP Vaccine (Adacel) 02/17/2009, 01/11/2019     Zoster vaccine recombinant adjuvanted (SHINGRIX) 01/11/2019, 06/14/2019

## 2022-10-20 ENCOUNTER — MYC MEDICAL ADVICE (OUTPATIENT)
Dept: FAMILY MEDICINE | Facility: CLINIC | Age: 62
End: 2022-10-20

## 2022-10-20 DIAGNOSIS — E03.8 OTHER SPECIFIED HYPOTHYROIDISM: ICD-10-CM

## 2022-10-21 NOTE — TELEPHONE ENCOUNTER
Forwarded to Evangelista Vasquez.  Please review  message re: dosage question and advise.    Nikia ORNELAS RN, BSN  [facility]

## 2022-10-23 NOTE — TELEPHONE ENCOUNTER
This is what I see in lab result comments:  Daniele Wall- your TSH has improved.  I can send in a refill again of the 88mcg dose.  Let's check this in 6-12 months.    There is not a 77mcg dose of levothyroxine.  Please call to discuss.    Evangelista

## 2022-10-24 NOTE — TELEPHONE ENCOUNTER
Pt is wondering if she should actually be on the 75 mcg instead   levothyroxine (SYNTHROID/LEVOTHROID) 88 MCG tablet (Discontinued) 90 tablet 1 2/7/2022 8/9/2022 No   Sig: TAKE 1 TABLET(75 MCG) BY MOUTH DAILY     levothyroxine (SYNTHROID/LEVOTHROID) 88 MCG tablet (Discontinued) 30 tablet 0 8/9/2022 8/22/2022 No   Sig: TAKE 1 TABLET BY MOUTH DAILY     levothyroxine (SYNTHROID/LEVOTHROID) 88 MCG tablet 90 tablet 3 8/22/2022  No   Sig: TAKE 1 TABLET BY MOUTH DAILY       2/4/2022 TSH off and pt requested to stay on the same dose (75 mcg) which was done until refill needed to cover for labs. It was a 30 day looks like it went up to 88 mcg.     See multi LAVON...    Tea YOUNG RN

## 2022-10-25 RX ORDER — LEVOTHYROXINE SODIUM 75 UG/1
TABLET ORAL
Qty: 90 TABLET | Refills: 1 | Status: SHIPPED | OUTPATIENT
Start: 2022-10-25 | End: 2023-10-13

## 2022-10-25 NOTE — TELEPHONE ENCOUNTER
Ok, i'm not sure how this got so confusing.  I can see where it got changed.  I'll send in the [facility] for her now.    She will be due to recheck the lab Feb - Aug 2023.      Thanks!  Evangelista

## 2022-11-05 NOTE — TELEPHONE ENCOUNTER
Routing refill request to provider for review/approval because:  Labs out of range:  TSH    TSH   Date Value Ref Range Status   07/14/2020 5.50 (H) 0.40 - 4.00 mU/L Final     Riccardo CAIN RN, BSN          No

## 2022-11-15 ENCOUNTER — OFFICE VISIT (OUTPATIENT)
Dept: FAMILY MEDICINE | Facility: CLINIC | Age: 62
End: 2022-11-15
Payer: COMMERCIAL

## 2022-11-15 VITALS
HEIGHT: 62 IN | SYSTOLIC BLOOD PRESSURE: 114 MMHG | HEART RATE: 72 BPM | BODY MASS INDEX: 34.8 KG/M2 | OXYGEN SATURATION: 96 % | TEMPERATURE: 98.7 F | RESPIRATION RATE: 15 BRPM | DIASTOLIC BLOOD PRESSURE: 64 MMHG | WEIGHT: 189.1 LBS

## 2022-11-15 DIAGNOSIS — Z00.00 ROUTINE GENERAL MEDICAL EXAMINATION AT A HEALTH CARE FACILITY: Primary | ICD-10-CM

## 2022-11-15 DIAGNOSIS — Z23 HIGH PRIORITY FOR 2019-NCOV VACCINE: ICD-10-CM

## 2022-11-15 DIAGNOSIS — F32.0 MILD MAJOR DEPRESSION (H): ICD-10-CM

## 2022-11-15 DIAGNOSIS — Z12.11 SCREEN FOR COLON CANCER: ICD-10-CM

## 2022-11-15 PROCEDURE — 91312 COVID-19,PF,PFIZER BOOSTER BIVALENT: CPT | Performed by: PHYSICIAN ASSISTANT

## 2022-11-15 PROCEDURE — 99396 PREV VISIT EST AGE 40-64: CPT | Mod: 25 | Performed by: PHYSICIAN ASSISTANT

## 2022-11-15 PROCEDURE — 90471 IMMUNIZATION ADMIN: CPT | Performed by: PHYSICIAN ASSISTANT

## 2022-11-15 PROCEDURE — 90682 RIV4 VACC RECOMBINANT DNA IM: CPT | Performed by: PHYSICIAN ASSISTANT

## 2022-11-15 PROCEDURE — 99213 OFFICE O/P EST LOW 20 MIN: CPT | Mod: 25 | Performed by: PHYSICIAN ASSISTANT

## 2022-11-15 PROCEDURE — 0124A COVID-19,PF,PFIZER BOOSTER BIVALENT: CPT | Performed by: PHYSICIAN ASSISTANT

## 2022-11-15 RX ORDER — BUPROPION HYDROCHLORIDE 150 MG/1
150 TABLET ORAL EVERY MORNING
Qty: 90 TABLET | Refills: 1 | Status: SHIPPED | OUTPATIENT
Start: 2022-11-15 | End: 2023-10-06

## 2022-11-15 ASSESSMENT — ENCOUNTER SYMPTOMS
HEMATURIA: 0
FEVER: 0
WEAKNESS: 0
ABDOMINAL PAIN: 0
PALPITATIONS: 0
MYALGIAS: 0
CONSTIPATION: 0
HEADACHES: 0
SHORTNESS OF BREATH: 0
EYE PAIN: 0
HEARTBURN: 0
DIZZINESS: 0
ARTHRALGIAS: 1
DYSURIA: 0
SORE THROAT: 0
NAUSEA: 0
JOINT SWELLING: 1
CHILLS: 0
NERVOUS/ANXIOUS: 0
COUGH: 0
FREQUENCY: 1
DIARRHEA: 0
BREAST MASS: 0
HEMATOCHEZIA: 0
PARESTHESIAS: 0

## 2022-11-15 ASSESSMENT — PATIENT HEALTH QUESTIONNAIRE - PHQ9
SUM OF ALL RESPONSES TO PHQ QUESTIONS 1-9: 0
SUM OF ALL RESPONSES TO PHQ QUESTIONS 1-9: 0
10. IF YOU CHECKED OFF ANY PROBLEMS, HOW DIFFICULT HAVE THESE PROBLEMS MADE IT FOR YOU TO DO YOUR WORK, TAKE CARE OF THINGS AT HOME, OR GET ALONG WITH OTHER PEOPLE: NOT DIFFICULT AT ALL

## 2022-11-15 ASSESSMENT — PAIN SCALES - GENERAL: PAINLEVEL: NO PAIN (0)

## 2022-11-15 NOTE — PROGRESS NOTES
SUBJECTIVE:   CC: Mae is an 62 year old who presents for preventive health visit.       Patient has been advised of split billing requirements and indicates understanding: Yes  Healthy Habits:     Getting at least 3 servings of Calcium per day:  Yes    Bi-annual eye exam:  Yes    Dental care twice a year:  Yes    Sleep apnea or symptoms of sleep apnea:  None    Diet:  Regular (no restrictions)    Frequency of exercise:  2-3 days/week    Duration of exercise:  30-45 minutes    Taking medications regularly:  Yes    Medication side effects:  None    PHQ-2 Total Score: 0    Additional concerns today:  No      Hypothyroidism Follow-up      Since last visit, patient describes the following symptoms: Weight stable, no hair loss, no skin changes, no constipation, no loose stools      Today's PHQ-2 Score:   PHQ-2 ( 1999 Pfizer) 11/15/2022   Q1: Little interest or pleasure in doing things 0   Q2: Feeling down, depressed or hopeless 0   PHQ-2 Score 0   PHQ-2 Total Score (12-17 Years)- Positive if 3 or more points; Administer PHQ-A if positive -   Q1: Little interest or pleasure in doing things Not at all   Q2: Feeling down, depressed or hopeless Not at all   PHQ-2 Score 0       Abuse: Current or Past (Physical, Sexual or Emotional) - No  Do you feel safe in your environment? Yes    Have you ever done Advance Care Planning? (For example, a Health Directive, POLST, or a discussion with a medical provider or your loved ones about your wishes): No, advance care planning information given to patient to review.  Patient plans to discuss their wishes with loved ones or provider.      Social History     Tobacco Use     Smoking status: Never     Smokeless tobacco: Never   Substance Use Topics     Alcohol use: Yes     Alcohol/week: 0.0 - 1.0 standard drinks     Comment: occ.         Alcohol Use 11/15/2022   Prescreen: >3 drinks/day or >7 drinks/week? No   Prescreen: >3 drinks/day or >7 drinks/week? -       Reviewed orders with  patient.  Reviewed health maintenance and updated orders accordingly - Yes  Lab work is in process  Labs reviewed in EPIC    Breast Cancer Screening:    FHS-7:   Breast CA Risk Assessment (FHS-7) 2/25/2022 11/15/2022   Did any of your first-degree relatives have breast or ovarian cancer? Yes Yes   Did any of your relatives have bilateral breast cancer? No Unknown   Did any man in your family have breast cancer? No No   Did any woman in your family have breast and ovarian cancer? No Yes   Did any woman in your family have breast cancer before age 50 y? Yes Yes   Do you have 2 or more relatives with breast and/or ovarian cancer? Yes Yes   Do you have 2 or more relatives with breast and/or bowel cancer? No No       Mammogram Screening: Recommended mammography every 1-2 years with patient discussion and risk factor consideration  Pertinent mammograms are reviewed under the imaging tab.    History of abnormal Pap smear: NO - age 30-65 PAP every 5 years with negative HPV co-testing recommended  PAP / HPV Latest Ref Rng & Units 2/25/2021 10/22/2020 10/24/2014   PAP (Historical) - NIL UNSAT NIL   HPV16 NEG:Negative Negative Negative -   HPV18 NEG:Negative Negative Negative -   HRHPV NEG:Negative Negative Negative -     Reviewed and updated as needed this visit by clinical staff   Tobacco  Allergies  Meds   Med Hx  Surg Hx  Fam Hx  Soc Hx        Reviewed and updated as needed this visit by Provider                 Past Medical History:   Diagnosis Date     Allergic rhinitis, cause unspecified 1990    Hx of immunotherapy , failed     Arthritis July 2010    rheumatoid and osteo     Depressive disorder     In the past     Family history of breast cancer in female     Sister BRCA pos but patient tested negative 7/2013     Gout 12/21/2012     Renal disease     incontinence     Thyroid disease     hypothyroid     Unsatisfactory cervical Papanicolaou smear 10/22/2020    10/22/20      Past Surgical History:   Procedure  "Laterality Date     BIOPSY  October 1, 2015    Skin - negative     CARPAL TUNNEL RELEASE RT/LT Right 09/23/2021     COLONOSCOPY  8-13-12    Dr. Marcelo Briscoe at [facility]     COLONOSCOPY  8/13/2012    Procedure: COLONOSCOPY;  Colonoscopy  ;  Surgeon: Marcelo Briscoe MD, MD;  Location:  GI     EXCISE MASS FOOT Left 7/12/2016    Procedure: EXCISE MASS FOOT;  Surgeon: Marcelo Vaughn DPM;  Location: [facility] NASAL/SINUS SCOPE W THER INSTILL  2005     SLING TRANSPUBO WITHOUT ANTERIOR COLPORRHAPHY  11/21/2011    Procedure:SLING TRANSPUBO WITHOUT ANTERIOR COLPORRHAPHY; Pubovaginal Sling; Surgeon:KENNETH NEGRO; Location: OR       Review of Systems   Constitutional: Negative for chills and fever.   HENT: Negative for congestion, ear pain, hearing loss and sore throat.    Eyes: Negative for pain and visual disturbance.   Respiratory: Negative for cough and shortness of breath.    Cardiovascular: Negative for chest pain, palpitations and peripheral edema.   Gastrointestinal: Negative for abdominal pain, constipation, diarrhea, heartburn, hematochezia and nausea.   Breasts:  Negative for tenderness, breast mass and discharge.   Genitourinary: Positive for frequency and urgency. Negative for dysuria, genital sores, hematuria, pelvic pain, vaginal bleeding and vaginal discharge.   Musculoskeletal: Positive for arthralgias and joint swelling. Negative for myalgias.   Skin: Negative for rash.   Neurological: Negative for dizziness, weakness, headaches and paresthesias.   Psychiatric/Behavioral: Negative for mood changes. The patient is not nervous/anxious.           OBJECTIVE:   /64   Pulse 72   Temp 98.7  F (37.1  C) (Oral)   Resp 15   Ht 1.575 m (5' 2\")   Wt 85.8 kg (189 lb 1.6 oz)   LMP 10/13/2014 (Approximate)   SpO2 96%   BMI 34.59 kg/m    Physical Exam  GENERAL: healthy, alert and no distress  EYES: Eyes grossly normal to inspection, PERRL and conjunctivae and sclerae normal  HENT: ear canals and TM's " "normal, nose and mouth without ulcers or lesions  NECK: no adenopathy, no asymmetry, masses, or scars and thyroid normal to palpation  RESP: lungs clear to auscultation - no rales, rhonchi or wheezes  CV: regular rate and rhythm, normal S1 S2, no S3 or S4, no murmur, click or rub, no peripheral edema and peripheral pulses strong  ABDOMEN: soft, nontender, no hepatosplenomegaly, no masses and bowel sounds normal  MS: no gross musculoskeletal defects noted, no edema  SKIN: no suspicious lesions or rashes  NEURO: Normal strength and tone, mentation intact and speech normal  PSYCH: mentation appears normal, affect normal/bright    Diagnostic Test Results:  Labs reviewed in Epic    ASSESSMENT/PLAN:   (Z00.00) Routine general medical examination at a health care facility  (primary encounter diagnosis)  Comment:   Plan: not currently due for fasting labs    (Z12.11) Screen for colon cancer  Comment:   Plan: COLOGUARD([employer])        Due for screening    (F32.0) Mild major depression (H)  Comment:   Plan: buPROPion (WELLBUTRIN XL) 150 MG 24 hr tablet        Mood stable- refills given.    (Z23) High priority for 2019-nCoV vaccine  Comment:   Plan: COVID-19,PF,PFIZER BOOSTER BIVALENT 12+Yrs              COUNSELING:  Reviewed preventive health counseling, as reflected in patient instructions       Regular exercise       Healthy diet/nutrition       Colorectal Cancer Screening    Estimated body mass index is 34.59 kg/m  as calculated from the following:    Height as of this encounter: 1.575 m (5' 2\").    Weight as of this encounter: 85.8 kg (189 lb 1.6 oz).    Weight management plan: Discussed healthy diet and exercise guidelines    She reports that she has never smoked. She has never used smokeless tobacco.      Counseling Resources:  ATP IV Guidelines  Pooled Cohorts Equation Calculator  Breast Cancer Risk Calculator  BRCA-Related Cancer Risk Assessment: FHS-7 Tool  FRAX Risk Assessment  ICSI Preventive " Guidelines  Dietary Guidelines for Americans, 2010  USDA's MyPlate  ASA Prophylaxis  Lung CA Screening    Evangelista Vasquez PA-C  [facility] [place]  Answers for HPI/ROS submitted by the patient on 11/15/2022  If you checked off any problems, how difficult have these problems made it for you to do your work, take care of things at home, or get along with other people?: Not difficult at all  PHQ9 TOTAL SCORE: 0

## 2022-11-30 ENCOUNTER — E-VISIT (OUTPATIENT)
Dept: URGENT CARE | Facility: CLINIC | Age: 62
End: 2022-11-30
Payer: COMMERCIAL

## 2022-11-30 DIAGNOSIS — R30.0 DIFFICULT OR PAINFUL URINATION: Primary | ICD-10-CM

## 2022-11-30 PROCEDURE — 99421 OL DIG E/M SVC 5-10 MIN: CPT | Performed by: INTERNAL MEDICINE

## 2022-11-30 NOTE — PATIENT INSTRUCTIONS
Dear Mae Connors,     After reviewing your responses, I would like you to come in for a urine test to make sure we treat you correctly. This urine test is to evaluate you for a possible urinary tract infection, and should be scheduled for today or tomorrow. Schedule a Lab Only appointment here.     Lab appointments are not available at most locations on the weekends. If no Lab Only appointment is available, you should be seen in any of our convenient Walk-in or Urgent Care Centers, which can be found on our website here.     You will receive instructions with your results in [facility] once they are available.     If your symptoms worsen, you develop pain in your back or stomach, develop fevers, or are not improving in 5 days, please contact your primary care provider for an appointment or visit a Walk-in or Urgent Care Center to be seen.     Thanks again for choosing us as your health care partner,     Nenita Vick MD

## 2022-12-01 ENCOUNTER — LAB (OUTPATIENT)
Dept: LAB | Facility: CLINIC | Age: 62
End: 2022-12-01
Payer: COMMERCIAL

## 2022-12-01 DIAGNOSIS — R30.0 DIFFICULT OR PAINFUL URINATION: ICD-10-CM

## 2022-12-01 LAB
ALBUMIN UR-MCNC: 30 MG/DL
APPEARANCE UR: ABNORMAL
BACTERIA #/AREA URNS HPF: ABNORMAL /HPF
BILIRUB UR QL STRIP: NEGATIVE
COLOR UR AUTO: YELLOW
GLUCOSE UR STRIP-MCNC: NEGATIVE MG/DL
HGB UR QL STRIP: ABNORMAL
KETONES UR STRIP-MCNC: NEGATIVE MG/DL
LEUKOCYTE ESTERASE UR QL STRIP: ABNORMAL
NITRATE UR QL: NEGATIVE
PH UR STRIP: 6.5 [PH] (ref 5–7)
RBC #/AREA URNS AUTO: ABNORMAL /HPF
SP GR UR STRIP: >=1.03 (ref 1–1.03)
SQUAMOUS #/AREA URNS AUTO: ABNORMAL /LPF
UROBILINOGEN UR STRIP-ACNC: 0.2 E.U./DL
WBC #/AREA URNS AUTO: ABNORMAL /HPF

## 2022-12-01 PROCEDURE — 87086 URINE CULTURE/COLONY COUNT: CPT

## 2022-12-01 PROCEDURE — 81001 URINALYSIS AUTO W/SCOPE: CPT

## 2022-12-01 PROCEDURE — 87186 SC STD MICRODIL/AGAR DIL: CPT

## 2022-12-02 ENCOUNTER — TELEPHONE (OUTPATIENT)
Dept: URGENT CARE | Facility: URGENT CARE | Age: 62
End: 2022-12-02

## 2022-12-02 DIAGNOSIS — N30.00 ACUTE CYSTITIS WITHOUT HEMATURIA: Primary | ICD-10-CM

## 2022-12-02 LAB — BACTERIA UR CULT: ABNORMAL

## 2022-12-02 RX ORDER — CEPHALEXIN 500 MG/1
500 CAPSULE ORAL 3 TIMES DAILY
Qty: 21 CAPSULE | Refills: 0 | Status: SHIPPED | OUTPATIENT
Start: 2022-12-02 | End: 2022-12-09

## 2022-12-02 NOTE — TELEPHONE ENCOUNTER
SUBJECTIVE:  The Patient's December 1, 2022, Urinalysis lab result is positive for a urinary tract infection.    PLAN:  Please notify patient of this this result.  She did an e-visit on November 30, 2022.  Once notified and if the patient has not yet received an antibiotic Rx (I don't see a Rx ordered in Epic.), I plan on e-prescribing the following medication:    Rx:  Cephalexin 500 mg capsules.  Disp:  21 capsules  Sig:  Take one capsule PO TID x 7 days.   Refills:  None.     Parker Sanchez MD

## 2022-12-02 NOTE — TELEPHONE ENCOUNTER
Called and spoke to patient regarding lab results. Pt verbalized understanding of results and would like medication sent to [facility] in [place], [place]    T'd up medication order and routing back to providers to sign.     PHUONG De La Cruz  9:53 AM 12/2/2022

## 2022-12-21 LAB — NONINV COLON CA DNA+OCC BLD SCRN STL QL: NEGATIVE

## 2023-01-15 ENCOUNTER — TELEPHONE (OUTPATIENT)
Dept: RHEUMATOLOGY | Facility: CLINIC | Age: 63
End: 2023-01-15
Payer: COMMERCIAL

## 2023-01-15 DIAGNOSIS — M05.9 SEROPOSITIVE RHEUMATOID ARTHRITIS (H): ICD-10-CM

## 2023-01-17 ENCOUNTER — LAB (OUTPATIENT)
Dept: LAB | Facility: CLINIC | Age: 63
End: 2023-01-17
Payer: COMMERCIAL

## 2023-01-17 DIAGNOSIS — M25.50 POLYARTHRALGIA: ICD-10-CM

## 2023-01-17 DIAGNOSIS — M05.9 SEROPOSITIVE RHEUMATOID ARTHRITIS (H): ICD-10-CM

## 2023-01-17 DIAGNOSIS — Z79.899 HIGH RISK MEDICATION USE: ICD-10-CM

## 2023-01-17 LAB
ALBUMIN SERPL BCG-MCNC: 4.3 G/DL (ref 3.5–5.2)
ALT SERPL W P-5'-P-CCNC: 46 U/L (ref 10–35)
CREAT SERPL-MCNC: 0.91 MG/DL (ref 0.51–0.95)
ERYTHROCYTE [DISTWIDTH] IN BLOOD BY AUTOMATED COUNT: 13.6 % (ref 10–15)
GFR SERPL CREATININE-BSD FRML MDRD: 72 ML/MIN/1.73M2
HCT VFR BLD AUTO: 40.2 % (ref 35–47)
HGB BLD-MCNC: 12.9 G/DL (ref 11.7–15.7)
MCH RBC QN AUTO: 29.5 PG (ref 26.5–33)
MCHC RBC AUTO-ENTMCNC: 32.1 G/DL (ref 31.5–36.5)
MCV RBC AUTO: 92 FL (ref 78–100)
PLATELET # BLD AUTO: 304 10E3/UL (ref 150–450)
RBC # BLD AUTO: 4.37 10E6/UL (ref 3.8–5.2)
WBC # BLD AUTO: 4.6 10E3/UL (ref 4–11)

## 2023-01-17 PROCEDURE — 82565 ASSAY OF CREATININE: CPT

## 2023-01-17 PROCEDURE — 85027 COMPLETE CBC AUTOMATED: CPT

## 2023-01-17 PROCEDURE — 36415 COLL VENOUS BLD VENIPUNCTURE: CPT

## 2023-01-17 PROCEDURE — 82040 ASSAY OF SERUM ALBUMIN: CPT

## 2023-01-17 PROCEDURE — 84460 ALANINE AMINO (ALT) (SGPT): CPT

## 2023-01-18 NOTE — TELEPHONE ENCOUNTER
Component      Latest Ref Rng & Units 4/27/2022 9/28/2022 1/17/2023   ALT      10 - 35 U/L 33 38 (H) 46 (H)     Other labs WNL.  Last OV 9/28  Next OV 3/28  Dx: RICHARD

## 2023-02-12 ENCOUNTER — OFFICE VISIT (OUTPATIENT)
Dept: URGENT CARE | Facility: URGENT CARE | Age: 63
End: 2023-02-12
Payer: COMMERCIAL

## 2023-02-12 VITALS
OXYGEN SATURATION: 97 % | RESPIRATION RATE: 18 BRPM | HEART RATE: 83 BPM | DIASTOLIC BLOOD PRESSURE: 78 MMHG | TEMPERATURE: 98.2 F | SYSTOLIC BLOOD PRESSURE: 118 MMHG

## 2023-02-12 DIAGNOSIS — J02.9 ACUTE PHARYNGITIS, UNSPECIFIED ETIOLOGY: ICD-10-CM

## 2023-02-12 DIAGNOSIS — J01.90 ACUTE SINUSITIS WITH SYMPTOMS > 10 DAYS: Primary | ICD-10-CM

## 2023-02-12 LAB — DEPRECATED S PYO AG THROAT QL EIA: NEGATIVE

## 2023-02-12 PROCEDURE — 99213 OFFICE O/P EST LOW 20 MIN: CPT | Performed by: FAMILY MEDICINE

## 2023-02-12 PROCEDURE — 87651 STREP A DNA AMP PROBE: CPT | Performed by: FAMILY MEDICINE

## 2023-02-12 ASSESSMENT — PAIN SCALES - GENERAL: PAINLEVEL: MODERATE PAIN (5)

## 2023-02-12 NOTE — PROGRESS NOTES
SUBJECTIVE:   Mae Connors is a 62 year old female presenting with a chief complaint of cough (productive of yellow mucus from the lungs and nose), nasal congestion, pressure at the forehead and bilateral cheeks, sinus congestion,  sore throat (moderate.  The sore throat started this morning).  .  Onset of symptoms was two weeks ago.  Course of illness is worsening. .    Current and Associated symptoms: as listed above.   Treatment measures tried include Neti Pot, Sudafed, Guaifenesin.  .  Predisposing factors include no sick contacts.      She received the COVID-19 vaccine bivalent booster on November 15, 2022.    Patient's at-home COVID-19 test was negative two days ago.  .  .    Past Medical History:   Diagnosis Date     Allergic rhinitis, cause unspecified 1990    Hx of immunotherapy , failed     Arthritis July 2010    rheumatoid and osteo     Depressive disorder     In the past     Family history of breast cancer in female     Sister BRCA pos but patient tested negative 7/2013     Gout 12/21/2012     Renal disease     incontinence     Thyroid disease     hypothyroid     Unsatisfactory cervical Papanicolaou smear 10/22/2020    10/22/20     Current Outpatient Medications   Medication Sig Dispense Refill     budesonide (RINOCORT AQUA) 32 MCG/ACT nasal spray Spray 1 spray into both nostrils daily       buPROPion (WELLBUTRIN XL) 150 MG 24 hr tablet Take 1 tablet (150 mg) by mouth every morning 90 tablet 1     cetirizine (ZYRTEC) 10 MG tablet Take 1 tablet by mouth every evening. 90 tablet 3     cholecalciferol (VITAMIN D3) 25 mcg (1000 units) capsule Take 1 capsule by mouth daily       folic acid 800 MCG tablet Take 800 mcg by mouth       hydroxychloroquine (PLAQUENIL) 200 MG tablet Take 1 tablet (200 mg) by mouth 2 times daily 180 tablet 0     levothyroxine (SYNTHROID/LEVOTHROID) 75 MCG tablet TAKE 1 TABLET BY MOUTH DAILY 90 tablet 1     methotrexate 2.5 MG tablet TAKE 3 TABLETS BY MOUTH 1 TIME WEEKLY 12 tablet  0     Social History     Tobacco Use     Smoking status: Never     Smokeless tobacco: Never   Substance Use Topics     Alcohol use: Yes     Alcohol/week: 0.0 - 1.0 standard drinks     Comment: occ.       ROS:  CONSTITUTIONAL:negative for fevers.   ENT/MOUTH:  Positive for facial pressure, nasal congestion, nasal mucus.    RESP:positive for cough.      OBJECTIVE:  /78   Pulse 83   Temp 98.2  F (36.8  C) (Tympanic)   Resp 18   LMP 10/13/2014 (Approximate)   SpO2 97%   GENERAL APPEARANCE: healthy, alert and no distress  HENT: TM's normal bilaterally, nasal turbinates erythematous, swollen and oropharynx is mildly erythematous without exudates.    NECK: supple, nontender, no lymphadenopathy  RESP: lungs clear to auscultation - no rales, rhonchi or wheezes  CV: regular rates and rhythm, normal S1 S2, no murmur noted    LAB:    Results for orders placed or performed in visit on 02/12/23   Streptococcus A Rapid Screen w/Reflex to PCR     Status: Normal    Specimen: Throat; Swab   Result Value Ref Range    Group A Strep antigen Negative Negative         ASSESSMENT:  Acute Pharyngitis.    Acute Sinusitis.      PLAN:  Rx:  Augmentin  Patient currently uses Budesonide nasal spray.  Continue this medication.    Continue the Neti Pot nasal rinses  Use a room humidifier or inhale steam  follow up if not better in 7-10 days.     Pending lab:  Strep PCR Test.      Parker Sanchez MD

## 2023-02-12 NOTE — PATIENT INSTRUCTIONS
Continue the Neti Pot nasal rinses.      You can use a room humidifier or inhale steam.      Drink plenty of water.      Continue the Budesonide nasal spray    Follow up if not better in 7-10 days.

## 2023-02-13 LAB — GROUP A STREP BY PCR: NOT DETECTED

## 2023-02-16 ENCOUNTER — LAB (OUTPATIENT)
Dept: LAB | Facility: CLINIC | Age: 63
End: 2023-02-16
Payer: COMMERCIAL

## 2023-02-16 DIAGNOSIS — M25.50 POLYARTHRALGIA: ICD-10-CM

## 2023-02-16 DIAGNOSIS — E03.8 OTHER SPECIFIED HYPOTHYROIDISM: ICD-10-CM

## 2023-02-16 DIAGNOSIS — M05.9 SEROPOSITIVE RHEUMATOID ARTHRITIS (H): ICD-10-CM

## 2023-02-16 DIAGNOSIS — Z79.899 HIGH RISK MEDICATION USE: ICD-10-CM

## 2023-02-16 LAB
ALBUMIN SERPL BCG-MCNC: 4.5 G/DL (ref 3.5–5.2)
ALT SERPL W P-5'-P-CCNC: 23 U/L (ref 10–35)
CREAT SERPL-MCNC: 1.07 MG/DL (ref 0.51–0.95)
ERYTHROCYTE [DISTWIDTH] IN BLOOD BY AUTOMATED COUNT: 13.4 % (ref 10–15)
GFR SERPL CREATININE-BSD FRML MDRD: 59 ML/MIN/1.73M2
HCT VFR BLD AUTO: 40 % (ref 35–47)
HGB BLD-MCNC: 12.8 G/DL (ref 11.7–15.7)
MCH RBC QN AUTO: 29 PG (ref 26.5–33)
MCHC RBC AUTO-ENTMCNC: 32 G/DL (ref 31.5–36.5)
MCV RBC AUTO: 91 FL (ref 78–100)
PLATELET # BLD AUTO: 352 10E3/UL (ref 150–450)
RBC # BLD AUTO: 4.41 10E6/UL (ref 3.8–5.2)
TSH SERPL DL<=0.005 MIU/L-ACNC: 3.74 UIU/ML (ref 0.3–4.2)
WBC # BLD AUTO: 4.9 10E3/UL (ref 4–11)

## 2023-02-16 PROCEDURE — 36415 COLL VENOUS BLD VENIPUNCTURE: CPT

## 2023-02-16 PROCEDURE — 85027 COMPLETE CBC AUTOMATED: CPT

## 2023-02-16 PROCEDURE — 82565 ASSAY OF CREATININE: CPT

## 2023-02-16 PROCEDURE — 82040 ASSAY OF SERUM ALBUMIN: CPT

## 2023-02-16 PROCEDURE — 84460 ALANINE AMINO (ALT) (SGPT): CPT

## 2023-02-16 PROCEDURE — 84443 ASSAY THYROID STIM HORMONE: CPT

## 2023-02-23 ENCOUNTER — MYC MEDICAL ADVICE (OUTPATIENT)
Dept: FAMILY MEDICINE | Facility: CLINIC | Age: 63
End: 2023-02-23
Payer: COMMERCIAL

## 2023-02-23 DIAGNOSIS — J01.90 ACUTE SINUSITIS TREATED WITH ANTIBIOTICS IN THE PAST 60 DAYS: Primary | ICD-10-CM

## 2023-02-23 DIAGNOSIS — M05.9 SEROPOSITIVE RHEUMATOID ARTHRITIS (H): ICD-10-CM

## 2023-02-23 RX ORDER — DOXYCYCLINE HYCLATE 100 MG
100 TABLET ORAL 2 TIMES DAILY
Qty: 20 TABLET | Refills: 0 | Status: SHIPPED | OUTPATIENT
Start: 2023-02-23 | End: 2023-06-27

## 2023-02-23 NOTE — TELEPHONE ENCOUNTER
Routing to Evangelista Vasquez PA-C to advise.     Sinus symptoms started about the beginning of February.   Had UC visit on 2/12/2023. Augmentin x 7 days was given.     Patient reports persisting sinus symptoms. See GrabInbox message below for details.

## 2023-02-23 NOTE — TELEPHONE ENCOUNTER
Called pt and advised of below per Evangelista Vasquez.  Pt wondered if doxycycline contained sulfa, advised double checking w/ pharmacist when she picks up med.    Nikia Fierro RN, BSN  [facility]

## 2023-02-23 NOTE — TELEPHONE ENCOUNTER
I will send in another round of abx for her.  If she is ok with this I'd like to use something different (doxycycline).    Evangelista

## 2023-02-24 RX ORDER — HYDROXYCHLOROQUINE SULFATE 200 MG/1
TABLET, FILM COATED ORAL
Qty: 180 TABLET | Refills: 0 | OUTPATIENT
Start: 2023-02-24

## 2023-02-24 RX ORDER — HYDROXYCHLOROQUINE SULFATE 200 MG/1
200 TABLET, FILM COATED ORAL 2 TIMES DAILY
Qty: 180 TABLET | Refills: 0 | Status: SHIPPED | OUTPATIENT
Start: 2023-02-24 | End: 2023-06-19

## 2023-03-28 ENCOUNTER — VIRTUAL VISIT (OUTPATIENT)
Dept: RHEUMATOLOGY | Facility: CLINIC | Age: 63
End: 2023-03-28
Payer: COMMERCIAL

## 2023-03-28 DIAGNOSIS — M15.0 PRIMARY OSTEOARTHRITIS INVOLVING MULTIPLE JOINTS: ICD-10-CM

## 2023-03-28 DIAGNOSIS — M05.9 SEROPOSITIVE RHEUMATOID ARTHRITIS (H): Primary | ICD-10-CM

## 2023-03-28 DIAGNOSIS — R76.8 ANTI-CYCLIC CITRULLINATED PEPTIDE ANTIBODY POSITIVE: ICD-10-CM

## 2023-03-28 DIAGNOSIS — Z79.899 HIGH RISK MEDICATION USE: ICD-10-CM

## 2023-03-28 PROCEDURE — 99214 OFFICE O/P EST MOD 30 MIN: CPT | Mod: VID | Performed by: INTERNAL MEDICINE

## 2023-03-28 NOTE — PROGRESS NOTES
Virtual Visit Details    Type of service:  Video Visit   Joined the call at 3/28/2023, 1:19:22 pm.  Left the call at 3/28/2023, 1:26:11 pm.  You were on the call for 6 minutes 48 seconds .  Originating Location (pt. Location): Home    Distant Location (provider location):  On-site  Platform used for Video Visit: Giselle        This document was created using a software with less than 100% fidelity, at times resulting in unintended, even erroneous syntax and grammar.  The reader is advised to keep this under consideration while reviewing, interpreting this note.           ASSESSMENT AND PLAN:    Diagnoses and all orders for this visit:  Seropositive rheumatoid arthritis (H)  -     methotrexate 2.5 MG tablet; TAKE 3 TABLETS BY MOUTH 1 TIME WEEKLY  Anti-cyclic citrullinated peptide antibody positive  High risk medication use  Primary osteoarthritis involving multiple joints      Follow up in 6 months      HISTORY OF PRESENTING ILLNESS:  Mae Connors 63 year old is evaluated here via video/audio link.evaluation of and establishment of care for rheumatoid arthritis which is seropositive with high titers of anti-CCP antibody, as checked on 8/3/2016.  She is on hydroxychloroquine, a modest dose of methotrexate.  As she feels that this is done the trick for her.  She noted mild discomfort off and on noting it at 1.0/10.  She is able to do all her day-to-day activities without difficulty she has noted no stiffness in the morning.  She is due for labs to be done today I reiterated the need for labs every 3 months.  Her most recent labs were done on 2/16/2023, renal impairment and mild noted at this is discussed with her outlined to her not to take nonsteroidals.  She had her eyes examined the other day.  She has osteoarthritis and is well aware of the management principles.  She has taken meloxicam intermittently we discussed the interaction of meloxicam and methotrexate and the preference for taking acetaminophen.  She  works as a  for the local [employer] system.  She grew up in [place] and was a till age 20.  Does not smoke.  Alcohol consumption moderate.  She describes herself otherwise in good health.,.             ROS enquiry held for fever, ocular symptoms, rash, headache,  GI issues.  Today we also discussed the issues related to the current pandemic, the pros and cons of the current treatment plan, the CDC guidelines such as social distancing washing the hands covering the cough.  ALLERGIES:Sulfa drugs    PAST MEDICAL/ACTIVE PROBLEMS/MEDICATION/SOCIAL DATA  Past Medical History:   Diagnosis Date     Allergic rhinitis, cause unspecified 1990    Hx of immunotherapy , failed     Arthritis July 2010    rheumatoid and osteo     Depressive disorder     In the past     Family history of breast cancer in female     Sister BRCA pos but patient tested negative 7/2013     Gout 12/21/2012     Renal disease     incontinence     Thyroid disease     hypothyroid     Unsatisfactory cervical Papanicolaou smear 10/22/2020    10/22/20     History   Smoking Status     Never   Smokeless Tobacco     Never     Patient Active Problem List   Diagnosis     Allergic rhinitis     Female stress incontinence     Hypothyroidism     Mild major depression (H)     CARDIOVASCULAR SCREENING; LDL GOAL LESS THAN 160     Rheumatoid arthritis involving both hands with positive rheumatoid factor (H)     Unsatisfactory cervical Papanicolaou smear     Current Outpatient Medications   Medication Sig Dispense Refill     budesonide (RINOCORT AQUA) 32 MCG/ACT nasal spray Spray 1 spray into both nostrils daily       buPROPion (WELLBUTRIN XL) 150 MG 24 hr tablet Take 1 tablet (150 mg) by mouth every morning 90 tablet 1     cetirizine (ZYRTEC) 10 MG tablet Take 1 tablet by mouth every evening. 90 tablet 3     cholecalciferol (VITAMIN D3) 25 mcg (1000 units) capsule Take 1 capsule by mouth daily       doxycycline hyclate (VIBRA-TABS) 100 MG tablet Take 1  tablet (100 mg) by mouth 2 times daily 20 tablet 0     folic acid 800 MCG tablet Take 800 mcg by mouth       hydroxychloroquine (PLAQUENIL) 200 MG tablet Take 1 tablet (200 mg) by mouth 2 times daily 180 tablet 0     levothyroxine (SYNTHROID/LEVOTHROID) 75 MCG tablet TAKE 1 TABLET BY MOUTH DAILY 90 tablet 1     methotrexate 2.5 MG tablet TAKE 3 TABLETS BY MOUTH 1 TIME WEEKLY 12 tablet 0         EXAMINATION:    Using the audio and video link as best as possible the constitutional, neck, neurologic, psych, skin, both upper extremities areas/organ system were evaluated during this assessment.  Some of the important findings: Alert, oriented, speech fluent.    Able to fully flex the digits, into fists bilaterally, wrist and elbow range of motion appear normal, abduction of the shoulder is normal.      LAB / IMAGING DATA:  ALT   Date Value Ref Range Status   02/16/2023 23 10 - 35 U/L Final   01/17/2023 46 (H) 10 - 35 U/L Final   09/28/2022 38 (H) 10 - 35 U/L Final   06/04/2021 26 0 - 50 U/L Final   02/25/2021 93 (H) 0 - 50 U/L Final   10/22/2020 37 0 - 50 U/L Final     Albumin   Date Value Ref Range Status   02/16/2023 4.5 3.5 - 5.2 g/dL Final   01/17/2023 4.3 3.5 - 5.2 g/dL Final   09/28/2022 4.5 3.5 - 5.2 g/dL Final   04/27/2022 3.7 3.4 - 5.0 g/dL Final   02/04/2022 3.3 (L) 3.4 - 5.0 g/dL Final   09/09/2021 3.8 3.4 - 5.0 g/dL Final   06/04/2021 3.7 3.4 - 5.0 g/dL Final   02/25/2021 3.8 3.4 - 5.0 g/dL Final   10/22/2020 3.8 3.4 - 5.0 g/dL Final       WBC   Date Value Ref Range Status   06/04/2021 6.0 4.0 - 11.0 10e9/L Final   02/25/2021 5.3 4.0 - 11.0 10e9/L Final     WBC Count   Date Value Ref Range Status   02/16/2023 4.9 4.0 - 11.0 10e3/uL Final   01/17/2023 4.6 4.0 - 11.0 10e3/uL Final     Hemoglobin   Date Value Ref Range Status   02/16/2023 12.8 11.7 - 15.7 g/dL Final   01/17/2023 12.9 11.7 - 15.7 g/dL Final   09/28/2022 12.1 11.7 - 15.7 g/dL Final   06/04/2021 11.3 (L) 11.7 - 15.7 g/dL Final     Comment:      Reviewed: OK with previous   02/25/2021 11.3 (L) 11.7 - 15.7 g/dL Final     Comment:     Results confirmed by repeat test   10/22/2020 12.1 11.7 - 15.7 g/dL Final     Platelet Count   Date Value Ref Range Status   02/16/2023 352 150 - 450 10e3/uL Final   01/17/2023 304 150 - 450 10e3/uL Final   09/28/2022 302 150 - 450 10e3/uL Final   06/04/2021 358 150 - 450 10e9/L Final   02/25/2021 320 150 - 450 10e9/L Final   10/22/2020 291 150 - 450 10e9/L Final       No results found for: CHINMAY

## 2023-04-09 DIAGNOSIS — M05.9 SEROPOSITIVE RHEUMATOID ARTHRITIS (H): ICD-10-CM

## 2023-04-10 RX ORDER — HYDROXYCHLOROQUINE SULFATE 200 MG/1
TABLET, FILM COATED ORAL
Qty: 180 TABLET | Refills: 0 | OUTPATIENT
Start: 2023-04-10

## 2023-04-28 ENCOUNTER — ANCILLARY PROCEDURE (OUTPATIENT)
Dept: MAMMOGRAPHY | Facility: CLINIC | Age: 63
End: 2023-04-28
Attending: PHYSICIAN ASSISTANT
Payer: COMMERCIAL

## 2023-04-28 PROCEDURE — 77063 BREAST TOMOSYNTHESIS BI: CPT | Mod: TC | Performed by: RADIOLOGY

## 2023-04-28 PROCEDURE — 77067 SCR MAMMO BI INCL CAD: CPT | Mod: TC | Performed by: RADIOLOGY

## 2023-06-12 ENCOUNTER — LAB (OUTPATIENT)
Dept: LAB | Facility: CLINIC | Age: 63
End: 2023-06-12
Attending: INTERNAL MEDICINE
Payer: COMMERCIAL

## 2023-06-12 DIAGNOSIS — Z79.899 HIGH RISK MEDICATION USE: ICD-10-CM

## 2023-06-12 DIAGNOSIS — M05.9 SEROPOSITIVE RHEUMATOID ARTHRITIS (H): ICD-10-CM

## 2023-06-12 DIAGNOSIS — M25.50 POLYARTHRALGIA: ICD-10-CM

## 2023-06-12 LAB
ALBUMIN SERPL BCG-MCNC: 4.4 G/DL (ref 3.5–5.2)
ALT SERPL W P-5'-P-CCNC: 28 U/L (ref 10–35)
CREAT SERPL-MCNC: 0.78 MG/DL (ref 0.51–0.95)
ERYTHROCYTE [DISTWIDTH] IN BLOOD BY AUTOMATED COUNT: 13.8 % (ref 10–15)
GFR SERPL CREATININE-BSD FRML MDRD: 86 ML/MIN/1.73M2
HCT VFR BLD AUTO: 34.7 % (ref 35–47)
HGB BLD-MCNC: 11.8 G/DL (ref 11.7–15.7)
MCH RBC QN AUTO: 29.8 PG (ref 26.5–33)
MCHC RBC AUTO-ENTMCNC: 34 G/DL (ref 31.5–36.5)
MCV RBC AUTO: 88 FL (ref 78–100)
PLATELET # BLD AUTO: 268 10E3/UL (ref 150–450)
RBC # BLD AUTO: 3.96 10E6/UL (ref 3.8–5.2)
WBC # BLD AUTO: 4.9 10E3/UL (ref 4–11)

## 2023-06-12 PROCEDURE — 85027 COMPLETE CBC AUTOMATED: CPT

## 2023-06-12 PROCEDURE — 82040 ASSAY OF SERUM ALBUMIN: CPT

## 2023-06-12 PROCEDURE — 36415 COLL VENOUS BLD VENIPUNCTURE: CPT

## 2023-06-12 PROCEDURE — 84460 ALANINE AMINO (ALT) (SGPT): CPT

## 2023-06-12 PROCEDURE — 82565 ASSAY OF CREATININE: CPT

## 2023-06-17 DIAGNOSIS — M05.9 SEROPOSITIVE RHEUMATOID ARTHRITIS (H): ICD-10-CM

## 2023-06-19 DIAGNOSIS — M05.9 SEROPOSITIVE RHEUMATOID ARTHRITIS (H): ICD-10-CM

## 2023-06-19 RX ORDER — HYDROXYCHLOROQUINE SULFATE 200 MG/1
200 TABLET, FILM COATED ORAL 2 TIMES DAILY
Qty: 180 TABLET | Refills: 0 | Status: SHIPPED | OUTPATIENT
Start: 2023-06-19 | End: 2023-10-03

## 2023-06-19 NOTE — TELEPHONE ENCOUNTER
Refill request from [facility] [address] [place], [place]    Medication: Hydroxychloroquine 200mg  Last Refill: 7/13/22  Last OV: 3/28/23  Last lab: 6/12/23  No future appts

## 2023-06-27 ENCOUNTER — OFFICE VISIT (OUTPATIENT)
Dept: FAMILY MEDICINE | Facility: CLINIC | Age: 63
End: 2023-06-27
Payer: COMMERCIAL

## 2023-06-27 VITALS
HEART RATE: 78 BPM | SYSTOLIC BLOOD PRESSURE: 124 MMHG | WEIGHT: 189.6 LBS | DIASTOLIC BLOOD PRESSURE: 76 MMHG | TEMPERATURE: 98.1 F | BODY MASS INDEX: 34.89 KG/M2 | OXYGEN SATURATION: 97 % | RESPIRATION RATE: 14 BRPM | HEIGHT: 62 IN

## 2023-06-27 DIAGNOSIS — L03.012 PARONYCHIA OF FINGER, LEFT: ICD-10-CM

## 2023-06-27 DIAGNOSIS — M65.30 TRIGGER FINGER, ACQUIRED: Primary | ICD-10-CM

## 2023-06-27 PROCEDURE — 99213 OFFICE O/P EST LOW 20 MIN: CPT | Performed by: PHYSICIAN ASSISTANT

## 2023-06-27 ASSESSMENT — PATIENT HEALTH QUESTIONNAIRE - PHQ9
SUM OF ALL RESPONSES TO PHQ QUESTIONS 1-9: 5
SUM OF ALL RESPONSES TO PHQ QUESTIONS 1-9: 5
10. IF YOU CHECKED OFF ANY PROBLEMS, HOW DIFFICULT HAVE THESE PROBLEMS MADE IT FOR YOU TO DO YOUR WORK, TAKE CARE OF THINGS AT HOME, OR GET ALONG WITH OTHER PEOPLE: SOMEWHAT DIFFICULT

## 2023-06-27 NOTE — PROGRESS NOTES
Assessment & Plan     Trigger finger, acquired  Provided conservative treatment options (brace, ice, NSAIDs) and can follow up with ortho hand if not improving.  - Orthopedic  Referral; Future    Paronychia of finger, left  Does not appear to be infected at this time and no fluid collection. I recommended monitoring for changes. If worsening consider antibiotics.      SEYMOUR Hernandez [facility] [place] is a 63 year old, presenting for the following health issues:  Musculoskeletal Problem        6/27/2023     2:20 PM   Additional Questions   Roomed by Dee Dee Hussein   Accompanied by LUIS     Musculoskeletal Problem    History of Present Illness       Reason for visit:  Thumb pain  Symptom onset:  1-2 weeks ago  Symptoms include:  Clicking feeling and pain  Symptom intensity:  Moderate  Symptom progression:  Worsening  Had these symptoms before:  No    She eats 2-3 servings of fruits and vegetables daily.She consumes 1 sweetened beverage(s) daily.She exercises with enough effort to increase her heart rate 10 to 19 minutes per day.  She exercises with enough effort to increase her heart rate 3 or less days per week.   She is taking medications regularly.    Today's PHQ-9         PHQ-9 Total Score: 5    PHQ-9 Q9 Thoughts of better off dead/self-harm past 2 weeks :   Not at all    How difficult have these problems made it for you to do your work, take care of things at home, or get along with other people: Somewhat difficult     Patient is a 63-year-old female with history of rheumatoid arthritis who presents today for evaluation of swelling and pain in the left thumb.  Patient reports in the last few days she has had more swelling of the left thumb and occasionally a clicking noise.  She has also noticed a redness in the base of the nail in the recent days.      Review of Systems   GENERAL:  No fevers  MUSCULOSKELETAL: As noted in HPI          Objective    BP  "124/76 (BP Location: Right arm, Patient Position: Sitting, Cuff Size: Adult Regular)   Pulse 78   Temp 98.1  F (36.7  C) (Oral)   Resp 14   Ht 1.575 m (5' 2\")   Wt 86 kg (189 lb 9.6 oz)   LMP 10/13/2014 (Approximate)   SpO2 97%   BMI 34.68 kg/m    Body mass index is 34.68 kg/m .  Physical Exam   GENERAL: No acute distress  HEENT: Normocephalic  VASCULAR: 2+ left radial pulse  EXTREMITIES:  Left upper extremity: 1st digit: some tenderness at the base of digit. No erythema or warmth of the 1st digit at the MCP or the IP joints. Small amount of erythema without significant tenderness at the base of the nail.  NEURO: Alert, non-focal              "

## 2023-07-08 DIAGNOSIS — M05.9 SEROPOSITIVE RHEUMATOID ARTHRITIS (H): ICD-10-CM

## 2023-07-10 RX ORDER — HYDROXYCHLOROQUINE SULFATE 200 MG/1
TABLET, FILM COATED ORAL
Qty: 180 TABLET | Refills: 0 | OUTPATIENT
Start: 2023-07-10

## 2023-07-12 ENCOUNTER — TRANSFERRED RECORDS (OUTPATIENT)
Dept: HEALTH INFORMATION MANAGEMENT | Facility: CLINIC | Age: 63
End: 2023-07-12
Payer: COMMERCIAL

## 2023-09-26 ENCOUNTER — LAB (OUTPATIENT)
Dept: LAB | Facility: CLINIC | Age: 63
End: 2023-09-26
Payer: COMMERCIAL

## 2023-09-26 DIAGNOSIS — Z79.899 HIGH RISK MEDICATION USE: ICD-10-CM

## 2023-09-26 DIAGNOSIS — M25.50 POLYARTHRALGIA: ICD-10-CM

## 2023-09-26 DIAGNOSIS — M05.9 SEROPOSITIVE RHEUMATOID ARTHRITIS (H): ICD-10-CM

## 2023-09-26 LAB
ALBUMIN SERPL BCG-MCNC: 4.5 G/DL (ref 3.5–5.2)
ALT SERPL W P-5'-P-CCNC: 26 U/L (ref 0–50)
CREAT SERPL-MCNC: 0.79 MG/DL (ref 0.51–0.95)
EGFRCR SERPLBLD CKD-EPI 2021: 85 ML/MIN/1.73M2
ERYTHROCYTE [DISTWIDTH] IN BLOOD BY AUTOMATED COUNT: 13.8 % (ref 10–15)
HCT VFR BLD AUTO: 36.5 % (ref 35–47)
HGB BLD-MCNC: 12.1 G/DL (ref 11.7–15.7)
MCH RBC QN AUTO: 29.2 PG (ref 26.5–33)
MCHC RBC AUTO-ENTMCNC: 33.2 G/DL (ref 31.5–36.5)
MCV RBC AUTO: 88 FL (ref 78–100)
PLATELET # BLD AUTO: 284 10E3/UL (ref 150–450)
RBC # BLD AUTO: 4.14 10E6/UL (ref 3.8–5.2)
WBC # BLD AUTO: 4.6 10E3/UL (ref 4–11)

## 2023-09-26 PROCEDURE — 82565 ASSAY OF CREATININE: CPT

## 2023-09-26 PROCEDURE — 84460 ALANINE AMINO (ALT) (SGPT): CPT

## 2023-09-26 PROCEDURE — 82040 ASSAY OF SERUM ALBUMIN: CPT

## 2023-09-26 PROCEDURE — 85027 COMPLETE CBC AUTOMATED: CPT

## 2023-09-26 PROCEDURE — 36415 COLL VENOUS BLD VENIPUNCTURE: CPT

## 2023-10-02 DIAGNOSIS — M05.9 SEROPOSITIVE RHEUMATOID ARTHRITIS (H): ICD-10-CM

## 2023-10-03 ENCOUNTER — TRANSFERRED RECORDS (OUTPATIENT)
Dept: HEALTH INFORMATION MANAGEMENT | Facility: CLINIC | Age: 63
End: 2023-10-03
Payer: COMMERCIAL

## 2023-10-03 ENCOUNTER — MYC REFILL (OUTPATIENT)
Dept: RHEUMATOLOGY | Facility: CLINIC | Age: 63
End: 2023-10-03
Payer: COMMERCIAL

## 2023-10-03 DIAGNOSIS — M05.9 SEROPOSITIVE RHEUMATOID ARTHRITIS (H): ICD-10-CM

## 2023-10-03 RX ORDER — HYDROXYCHLOROQUINE SULFATE 200 MG/1
200 TABLET, FILM COATED ORAL 2 TIMES DAILY
Qty: 180 TABLET | Refills: 0 | Status: SHIPPED | OUTPATIENT
Start: 2023-10-03 | End: 2023-10-16

## 2023-10-06 DIAGNOSIS — F32.0 MILD MAJOR DEPRESSION (H): ICD-10-CM

## 2023-10-06 RX ORDER — BUPROPION HYDROCHLORIDE 150 MG/1
150 TABLET ORAL EVERY MORNING
Qty: 90 TABLET | Refills: 0 | Status: SHIPPED | OUTPATIENT
Start: 2023-10-06 | End: 2023-11-16

## 2023-10-12 ENCOUNTER — IMMUNIZATION (OUTPATIENT)
Dept: FAMILY MEDICINE | Facility: CLINIC | Age: 63
End: 2023-10-12
Payer: COMMERCIAL

## 2023-10-12 DIAGNOSIS — Z23 ENCOUNTER FOR IMMUNIZATION: Primary | ICD-10-CM

## 2023-10-12 PROCEDURE — 90682 RIV4 VACC RECOMBINANT DNA IM: CPT

## 2023-10-12 PROCEDURE — 90471 IMMUNIZATION ADMIN: CPT

## 2023-10-12 PROCEDURE — 99207 PR NO CHARGE NURSE ONLY: CPT

## 2023-10-12 NOTE — PROGRESS NOTES
Prior to immunization administration, verified patients identity using patient s name and date of birth. Please see Immunization Activity for additional information.     Screening Questionnaire for Adult Immunization    Are you sick today?   No   Do you have allergies to medications, food, a vaccine component or latex?   No   Have you ever had a serious reaction after receiving a vaccination?   No   Do you have a long-term health problem with heart, lung, kidney, or metabolic disease (e.g., diabetes), asthma, a blood disorder, no spleen, complement component deficiency, a cochlear implant, or a spinal fluid leak?  Are you on long-term aspirin therapy?   No   Do you have cancer, leukemia, HIV/AIDS, or any other immune system problem?   No   Do you have a parent, brother, or sister with an immune system problem?   No   In the past 3 months, have you taken medications that affect  your immune system, such as prednisone, other steroids, or anticancer drugs; drugs for the treatment of rheumatoid arthritis, Crohn s disease, or psoriasis; or have you had radiation treatments?   No   Have you had a seizure, or a brain or other nervous system problem?   No   During the past year, have you received a transfusion of blood or blood    products, or been given immune (gamma) globulin or antiviral drug?   No   For women: Are you pregnant or is there a chance you could become       pregnant during the next month?   No   Have you received any vaccinations in the past 4 weeks?   No     Immunization questionnaire answers were all negative.    I have reviewed the following standing orders:   This patient is due and qualifies for the Influenza vaccine.    Click here for Influenza Vaccine Standing Order    I have reviewed the vaccines inclusion and exclusion criteria; No concerns regarding eligibility.     Patient instructed to remain in clinic for 15 minutes afterwards, and to report any adverse reactions.     Screening performed by  Quinton Thomas MA on 10/12/2023 at 8:20 AM.

## 2023-10-13 DIAGNOSIS — E03.8 OTHER SPECIFIED HYPOTHYROIDISM: ICD-10-CM

## 2023-10-13 RX ORDER — LEVOTHYROXINE SODIUM 75 UG/1
TABLET ORAL
Qty: 90 TABLET | Refills: 0 | Status: SHIPPED | OUTPATIENT
Start: 2023-10-13 | End: 2024-01-08

## 2023-10-16 ENCOUNTER — VIRTUAL VISIT (OUTPATIENT)
Dept: RHEUMATOLOGY | Facility: CLINIC | Age: 63
End: 2023-10-16
Payer: COMMERCIAL

## 2023-10-16 DIAGNOSIS — R76.8 ANTI-CYCLIC CITRULLINATED PEPTIDE ANTIBODY POSITIVE: ICD-10-CM

## 2023-10-16 DIAGNOSIS — M05.9 SEROPOSITIVE RHEUMATOID ARTHRITIS (H): Primary | ICD-10-CM

## 2023-10-16 DIAGNOSIS — M15.0 PRIMARY OSTEOARTHRITIS INVOLVING MULTIPLE JOINTS: ICD-10-CM

## 2023-10-16 DIAGNOSIS — Z79.899 HIGH RISK MEDICATION USE: ICD-10-CM

## 2023-10-16 PROCEDURE — 99214 OFFICE O/P EST MOD 30 MIN: CPT | Mod: VID | Performed by: INTERNAL MEDICINE

## 2023-10-16 RX ORDER — HYDROXYCHLOROQUINE SULFATE 200 MG/1
200 TABLET, FILM COATED ORAL 2 TIMES DAILY
Start: 2023-10-16 | End: 2024-01-08

## 2023-10-16 NOTE — PROGRESS NOTES
Virtual Visit Details    Type of service:  Video Visit     Originating Location (pt. Location): Home    Distant Location (provider location):  On-site  Platform used for Video Visit: Giselle     Joined the call at 10/16/2023, 9:14:01 am.  Left the call at 10/16/2023, 9:22:31 am.  You were on the call for 8 minutes 30 seconds .    This document was created using a software with less than 100% fidelity, at times resulting in unintended, even erroneous syntax and grammar.  The reader is advised to keep this under consideration while reviewing, interpreting this note.           ASSESSMENT AND PLAN:    Diagnoses and all orders for this visit:  Seropositive rheumatoid arthritis (H)  -     hydroxychloroquine (PLAQUENIL) 200 MG tablet; Take 1 tablet (200 mg) by mouth 2 times daily  Anti-cyclic citrullinated peptide antibody positive  High risk medication use  Primary osteoarthritis involving multiple joints      Follow up in 6 months      HISTORY OF PRESENTING ILLNESS:  Mae Connors 63 year old is evaluated here via video/audio link.rheumatoid arthritis which is seropositive with high titers of anti-CCP antibody, as checked on 8/3/2016.  She is on hydroxychloroquine, and methotrexate.  Her symptoms are well controlled with this combination.  She also takes folic acid.  She had her eyes examined recently was reassured those data are reviewed.  Her labs are done in the end of September which were entirely normal.  She hardly ever takes Tylenol or ibuprofen.  She uses topical creams that she has heard about for wrist discomfort.  We talked about diclofenac gel instead acetaminophen.  She no longer takes over-the-counter nonsteroidals.  There is no swelling there is no stiffness in the morning there is no limitation in day-to-day activities.   She works as a  for the local [employer] system.  She grew up in [place] and was a till age 20.  Does not smoke.  Alcohol consumption moderate.  She describes herself  otherwise in good health.,.          ROS enquiry held for fever, ocular symptoms, rash, headache,  GI issues.  Today we also discussed the issues related to the current pandemic, the pros and cons of the current treatment plan, the CDC guidelines such as social distancing washing the hands covering the cough.  ALLERGIES:Sulfa antibiotics    PAST MEDICAL/ACTIVE PROBLEMS/MEDICATION/SOCIAL DATA  Past Medical History:   Diagnosis Date    Allergic rhinitis, cause unspecified 1990    Hx of immunotherapy , failed    Arthritis July 2010    rheumatoid and osteo    Depressive disorder     In the past    Family history of breast cancer in female     Sister BRCA pos but patient tested negative 7/2013    Gout 12/21/2012    Renal disease     incontinence    Thyroid disease     hypothyroid    Unsatisfactory cervical Papanicolaou smear 10/22/2020    10/22/20     History   Smoking Status    Never   Smokeless Tobacco    Never     Patient Active Problem List   Diagnosis    Allergic rhinitis    Female stress incontinence    Hypothyroidism    Mild major depression (H)    CARDIOVASCULAR SCREENING; LDL GOAL LESS THAN 160    Rheumatoid arthritis involving both hands with positive rheumatoid factor (H)    Unsatisfactory cervical Papanicolaou smear     Current Outpatient Medications   Medication Sig Dispense Refill    buPROPion (WELLBUTRIN XL) 150 MG 24 hr tablet Take 1 tablet (150 mg) by mouth every morning +++NEED APPOINTMENT+++ 90 tablet 0    cetirizine (ZYRTEC) 10 MG tablet Take 1 tablet by mouth every evening. 90 tablet 3    cholecalciferol (VITAMIN D3) 25 mcg (1000 units) capsule Take 1 capsule by mouth daily      folic acid 800 MCG tablet Take 800 mcg by mouth      hydroxychloroquine (PLAQUENIL) 200 MG tablet Take 1 tablet (200 mg) by mouth 2 times daily 180 tablet 0    levothyroxine (SYNTHROID/LEVOTHROID) 75 MCG tablet TAKE 1 TABLET BY MOUTH DAILY 90 tablet 0    methotrexate 2.5 MG tablet TAKE 3 TABLETS BY MOUTH 1 TIME WEEKLY 36  tablet 0    budesonide (RINOCORT AQUA) 32 MCG/ACT nasal spray Spray 1 spray into both nostrils daily (Patient not taking: Reported on 10/16/2023)           EXAMINATION:    Using the audio and video link as best as possible the constitutional, neck, neurologic, psych, skin, both upper extremities areas/organ system were evaluated during this assessment.  Some of the important findings: Alert, oriented, speech fluent.    Able to fully flex the digits, into fists bilaterally, wrist and elbow range of motion appear normal, abduction of the shoulder is normal.      LAB / IMAGING DATA:  ALT   Date Value Ref Range Status   09/26/2023 26 0 - 50 U/L Final     Comment:     Reference intervals for this test were updated on 6/12/2023 to more accurately reflect our healthy population. There may be differences in the flagging of prior results with similar values performed with this method. Interpretation of those prior results can be made in the context of the updated reference intervals.     06/12/2023 28 10 - 35 U/L Final   02/16/2023 23 10 - 35 U/L Final   06/04/2021 26 0 - 50 U/L Final   02/25/2021 93 (H) 0 - 50 U/L Final   10/22/2020 37 0 - 50 U/L Final     Albumin   Date Value Ref Range Status   09/26/2023 4.5 3.5 - 5.2 g/dL Final   06/12/2023 4.4 3.5 - 5.2 g/dL Final   02/16/2023 4.5 3.5 - 5.2 g/dL Final   04/27/2022 3.7 3.4 - 5.0 g/dL Final   02/04/2022 3.3 (L) 3.4 - 5.0 g/dL Final   09/09/2021 3.8 3.4 - 5.0 g/dL Final   06/04/2021 3.7 3.4 - 5.0 g/dL Final   02/25/2021 3.8 3.4 - 5.0 g/dL Final   10/22/2020 3.8 3.4 - 5.0 g/dL Final       WBC   Date Value Ref Range Status   06/04/2021 6.0 4.0 - 11.0 10e9/L Final   02/25/2021 5.3 4.0 - 11.0 10e9/L Final     WBC Count   Date Value Ref Range Status   09/26/2023 4.6 4.0 - 11.0 10e3/uL Final   06/12/2023 4.9 4.0 - 11.0 10e3/uL Final     Hemoglobin   Date Value Ref Range Status   09/26/2023 12.1 11.7 - 15.7 g/dL Final   06/12/2023 11.8 11.7 - 15.7 g/dL Final   02/16/2023 12.8  "11.7 - 15.7 g/dL Final   06/04/2021 11.3 (L) 11.7 - 15.7 g/dL Final     Comment:     Reviewed: OK with previous   02/25/2021 11.3 (L) 11.7 - 15.7 g/dL Final     Comment:     Results confirmed by repeat test   10/22/2020 12.1 11.7 - 15.7 g/dL Final     Platelet Count   Date Value Ref Range Status   09/26/2023 284 150 - 450 10e3/uL Final   06/12/2023 268 150 - 450 10e3/uL Final   02/16/2023 352 150 - 450 10e3/uL Final   06/04/2021 358 150 - 450 10e9/L Final   02/25/2021 320 150 - 450 10e9/L Final   10/22/2020 291 150 - 450 10e9/L Final       No results found for: \"CHINMAY\"   "

## 2023-11-09 ASSESSMENT — ENCOUNTER SYMPTOMS
HEMATURIA: 0
BREAST MASS: 0
HEARTBURN: 0
ABDOMINAL PAIN: 0
WEAKNESS: 0
DYSURIA: 0
MYALGIAS: 0
DIZZINESS: 0
NAUSEA: 0
JOINT SWELLING: 0
FEVER: 0
PARESTHESIAS: 0
NERVOUS/ANXIOUS: 0
CHILLS: 0
PALPITATIONS: 0
SHORTNESS OF BREATH: 0
FREQUENCY: 1
DIARRHEA: 0
EYE PAIN: 0
ARTHRALGIAS: 0
SORE THROAT: 0
CONSTIPATION: 0
COUGH: 0
HEADACHES: 0
HEMATOCHEZIA: 0

## 2023-11-16 ENCOUNTER — OFFICE VISIT (OUTPATIENT)
Dept: FAMILY MEDICINE | Facility: CLINIC | Age: 63
End: 2023-11-16
Payer: COMMERCIAL

## 2023-11-16 VITALS
HEART RATE: 81 BPM | SYSTOLIC BLOOD PRESSURE: 121 MMHG | BODY MASS INDEX: 34.74 KG/M2 | RESPIRATION RATE: 14 BRPM | TEMPERATURE: 98.5 F | WEIGHT: 188.8 LBS | OXYGEN SATURATION: 96 % | HEIGHT: 62 IN | DIASTOLIC BLOOD PRESSURE: 78 MMHG

## 2023-11-16 DIAGNOSIS — Z00.00 ROUTINE GENERAL MEDICAL EXAMINATION AT A HEALTH CARE FACILITY: Primary | ICD-10-CM

## 2023-11-16 DIAGNOSIS — E03.8 OTHER SPECIFIED HYPOTHYROIDISM: ICD-10-CM

## 2023-11-16 DIAGNOSIS — F32.0 MILD MAJOR DEPRESSION (H): ICD-10-CM

## 2023-11-16 LAB
ANION GAP SERPL CALCULATED.3IONS-SCNC: 11 MMOL/L (ref 7–15)
BUN SERPL-MCNC: 16.3 MG/DL (ref 8–23)
CALCIUM SERPL-MCNC: 9.7 MG/DL (ref 8.8–10.2)
CHLORIDE SERPL-SCNC: 103 MMOL/L (ref 98–107)
CHOLEST SERPL-MCNC: 193 MG/DL
CREAT SERPL-MCNC: 0.96 MG/DL (ref 0.51–0.95)
DEPRECATED HCO3 PLAS-SCNC: 25 MMOL/L (ref 22–29)
EGFRCR SERPLBLD CKD-EPI 2021: 67 ML/MIN/1.73M2
GLUCOSE SERPL-MCNC: 94 MG/DL (ref 70–99)
HDLC SERPL-MCNC: 88 MG/DL
LDLC SERPL CALC-MCNC: 89 MG/DL
NONHDLC SERPL-MCNC: 105 MG/DL
POTASSIUM SERPL-SCNC: 4.5 MMOL/L (ref 3.4–5.3)
SODIUM SERPL-SCNC: 139 MMOL/L (ref 135–145)
T4 FREE SERPL-MCNC: 1.14 NG/DL (ref 0.9–1.7)
TRIGL SERPL-MCNC: 78 MG/DL
TSH SERPL DL<=0.005 MIU/L-ACNC: 7.55 UIU/ML (ref 0.3–4.2)

## 2023-11-16 PROCEDURE — 80061 LIPID PANEL: CPT | Performed by: PHYSICIAN ASSISTANT

## 2023-11-16 PROCEDURE — 90480 ADMN SARSCOV2 VAC 1/ONLY CMP: CPT | Performed by: PHYSICIAN ASSISTANT

## 2023-11-16 PROCEDURE — 91320 SARSCV2 VAC 30MCG TRS-SUC IM: CPT | Performed by: PHYSICIAN ASSISTANT

## 2023-11-16 PROCEDURE — 36415 COLL VENOUS BLD VENIPUNCTURE: CPT | Performed by: PHYSICIAN ASSISTANT

## 2023-11-16 PROCEDURE — 84439 ASSAY OF FREE THYROXINE: CPT | Performed by: PHYSICIAN ASSISTANT

## 2023-11-16 PROCEDURE — 99213 OFFICE O/P EST LOW 20 MIN: CPT | Mod: 25 | Performed by: PHYSICIAN ASSISTANT

## 2023-11-16 PROCEDURE — 84443 ASSAY THYROID STIM HORMONE: CPT | Performed by: PHYSICIAN ASSISTANT

## 2023-11-16 PROCEDURE — 80048 BASIC METABOLIC PNL TOTAL CA: CPT | Performed by: PHYSICIAN ASSISTANT

## 2023-11-16 PROCEDURE — 99396 PREV VISIT EST AGE 40-64: CPT | Performed by: PHYSICIAN ASSISTANT

## 2023-11-16 RX ORDER — BUPROPION HYDROCHLORIDE 150 MG/1
150 TABLET ORAL EVERY MORNING
Qty: 90 TABLET | Refills: 0 | Status: SHIPPED | OUTPATIENT
Start: 2023-11-16 | End: 2024-01-08

## 2023-11-16 ASSESSMENT — ENCOUNTER SYMPTOMS
EYE PAIN: 0
COUGH: 0
PALPITATIONS: 0
HEADACHES: 0
ABDOMINAL PAIN: 0
MYALGIAS: 0
NERVOUS/ANXIOUS: 0
SORE THROAT: 0
HEMATURIA: 0
BREAST MASS: 0
PARESTHESIAS: 0
JOINT SWELLING: 0
FREQUENCY: 1
FEVER: 0
DIZZINESS: 0
DIARRHEA: 0
ARTHRALGIAS: 0
HEARTBURN: 0
WEAKNESS: 0
CHILLS: 0
DYSURIA: 0
CONSTIPATION: 0
NAUSEA: 0
SHORTNESS OF BREATH: 0
HEMATOCHEZIA: 0

## 2023-11-16 ASSESSMENT — PAIN SCALES - GENERAL: PAINLEVEL: NO PAIN (0)

## 2023-11-16 ASSESSMENT — PATIENT HEALTH QUESTIONNAIRE - PHQ9: SUM OF ALL RESPONSES TO PHQ QUESTIONS 1-9: 0

## 2023-11-16 NOTE — PROGRESS NOTES
SUBJECTIVE:   Mae is a 63 year old, presenting for the following:  Physical        11/16/2023     8:56 AM   Additional Questions   Roomed by Shabnam KOROMA MA   Accompanied by self         11/16/2023     8:56 AM   Patient Reported Additional Medications   Patient reports taking the following new medications none       Healthy Habits:     Getting at least 3 servings of Calcium per day:  Yes    Bi-annual eye exam:  Yes    Dental care twice a year:  Yes    Sleep apnea or symptoms of sleep apnea:  None    Diet:  Regular (no restrictions)    Frequency of exercise:  2-3 days/week    Duration of exercise:  15-30 minutes    Taking medications regularly:  Yes    Medication side effects:  None    Additional concerns today:  No    Wellbutrin- just started again in Oct.  Takes seasonally.  Mood is doing well at this time.    Hypothyroidism Follow-up    Since last visit, patient describes the following symptoms: Weight stable, no hair loss, no skin changes, no constipation, no loose stools      Social History     Tobacco Use    Smoking status: Never    Smokeless tobacco: Never   Substance Use Topics    Alcohol use: Yes     Alcohol/week: 0.0 - 1.0 standard drinks of alcohol     Comment: occ.             11/9/2023     9:04 AM   Alcohol Use   Prescreen: >3 drinks/day or >7 drinks/week? No     Reviewed orders with patient.  Reviewed health maintenance and updated orders accordingly - Yes  Lab work is in process  Labs reviewed in EPIC    Breast Cancer Screening:    FHS-7:       2/25/2022     8:35 AM 11/15/2022     1:46 PM 4/28/2023     4:12 PM 11/9/2023     9:06 AM   Breast CA Risk Assessment (FHS-7)   Did any of your first-degree relatives have breast or ovarian cancer? Yes Yes Yes YesTwo cousins and sister (BRCA)   Did any of your relatives have bilateral breast cancer? No Unknown No No   Did any man in your family have breast cancer? No No No No   Did any woman in your family have breast and ovarian cancer? No Yes No Yes   Did any  woman in your family have breast cancer before age 50 y? Yes Yes No Yes   Do you have 2 or more relatives with breast and/or ovarian cancer? Yes Yes Yes Yes   Do you have 2 or more relatives with breast and/or bowel cancer? No No No Yes       Mammogram Screening: Recommended mammography every 1-2 years with patient discussion and risk factor consideration  Pertinent mammograms are reviewed under the imaging tab.    History of abnormal Pap smear: NO - age 30-65 PAP every 5 years with negative HPV co-testing recommended      Latest Ref Rng & Units 2/25/2021     1:56 PM 2/25/2021     1:20 PM 10/22/2020     4:00 PM   PAP / HPV   PAP (Historical)  NIL      HPV 16 DNA NEG^Negative  Negative  Negative    HPV 18 DNA NEG^Negative  Negative  Negative    Other HR HPV NEG^Negative  Negative  Negative      Reviewed and updated as needed this visit by clinical staff   Tobacco  Allergies  Meds              Reviewed and updated as needed this visit by Provider                     Review of Systems   Constitutional:  Negative for chills and fever.   HENT:  Negative for congestion, ear pain, hearing loss and sore throat.    Eyes:  Negative for pain and visual disturbance.   Respiratory:  Negative for cough and shortness of breath.    Cardiovascular:  Negative for chest pain, palpitations and peripheral edema.   Gastrointestinal:  Negative for abdominal pain, constipation, diarrhea, heartburn, hematochezia and nausea.   Breasts:  Negative for tenderness, breast mass and discharge.   Genitourinary:  Positive for frequency and urgency. Negative for dysuria, genital sores, hematuria, pelvic pain, vaginal bleeding and vaginal discharge.   Musculoskeletal:  Negative for arthralgias, joint swelling and myalgias.   Skin:  Negative for rash.   Neurological:  Negative for dizziness, weakness, headaches and paresthesias.   Psychiatric/Behavioral:  Negative for mood changes. The patient is not nervous/anxious.           OBJECTIVE:   /78  "(BP Location: Right arm, Patient Position: Sitting, Cuff Size: Adult Regular)   Pulse 81   Temp 98.5  F (36.9  C) (Oral)   Resp 14   Ht 1.575 m (5' 2\")   Wt 85.6 kg (188 lb 12.8 oz)   LMP 10/13/2014 (Approximate)   SpO2 96%   BMI 34.53 kg/m    Physical Exam  GENERAL: healthy, alert and no distress  EYES: Eyes grossly normal to inspection, PERRL and conjunctivae and sclerae normal  HENT: ear canals and TM's normal, nose and mouth without ulcers or lesions  NECK: no adenopathy, no asymmetry, masses, or scars and thyroid normal to palpation  RESP: lungs clear to auscultation - no rales, rhonchi or wheezes  CV: regular rate and rhythm, normal S1 S2, no S3 or S4, no murmur, click or rub, no peripheral edema and peripheral pulses strong  ABDOMEN: soft, nontender, no hepatosplenomegaly, no masses and bowel sounds normal  MS: no gross musculoskeletal defects noted, no edema  SKIN: no suspicious lesions or rashes  NEURO: Normal strength and tone, mentation intact and speech normal  PSYCH: mentation appears normal, affect normal/bright    Diagnostic Test Results:  Labs reviewed in Epic    ASSESSMENT/PLAN:   1. Routine general medical examination at a health care facility  Checking labs- patient has coffee this AM with a little milk but has not eaten  - Lipid panel reflex to direct LDL Non-fasting; Future  - Basic metabolic panel  (Ca, Cl, CO2, Creat, Gluc, K, Na, BUN); Future  - Lipid panel reflex to direct LDL Non-fasting  - Basic metabolic panel  (Ca, Cl, CO2, Creat, Gluc, K, Na, BUN)    2. Mild major depression (H24)  Mood stable.  PHQ=0  Refilling to use seasonally  - buPROPion (WELLBUTRIN XL) 150 MG 24 hr tablet; Take 1 tablet (150 mg) by mouth every morning Profile Rx: patient will contact pharmacy when needed  Dispense: 90 tablet; Refill: 0    3. Other specified hypothyroidism  Checking labs.  Will refill as needed.  - TSH with free T4 reflex; Future  - TSH with free T4 reflex            COUNSELING:  Reviewed " "preventive health counseling, as reflected in patient instructions       Regular exercise       Healthy diet/nutrition      BMI:   Estimated body mass index is 34.53 kg/m  as calculated from the following:    Height as of this encounter: 1.575 m (5' 2\").    Weight as of this encounter: 85.6 kg (188 lb 12.8 oz).   Weight management plan: Discussed healthy diet and exercise guidelines      She reports that she has never smoked. She has never used smokeless tobacco.            Evangelista Vasquez PA-C  [facility]  "

## 2024-01-08 ENCOUNTER — MYC REFILL (OUTPATIENT)
Dept: FAMILY MEDICINE | Facility: CLINIC | Age: 64
End: 2024-01-08
Payer: COMMERCIAL

## 2024-01-08 DIAGNOSIS — F32.0 MILD MAJOR DEPRESSION (H): ICD-10-CM

## 2024-01-08 DIAGNOSIS — E03.8 OTHER SPECIFIED HYPOTHYROIDISM: ICD-10-CM

## 2024-01-08 RX ORDER — BUPROPION HYDROCHLORIDE 150 MG/1
150 TABLET ORAL EVERY MORNING
Qty: 90 TABLET | Refills: 0 | Status: SHIPPED | OUTPATIENT
Start: 2024-01-08 | End: 2024-01-16

## 2024-01-09 DIAGNOSIS — E03.8 OTHER SPECIFIED HYPOTHYROIDISM: ICD-10-CM

## 2024-01-09 RX ORDER — LEVOTHYROXINE SODIUM 75 UG/1
TABLET ORAL
Qty: 30 TABLET | Refills: 0 | Status: SHIPPED | OUTPATIENT
Start: 2024-01-09 | End: 2024-03-05

## 2024-01-10 ENCOUNTER — LAB (OUTPATIENT)
Dept: LAB | Facility: CLINIC | Age: 64
End: 2024-01-10
Payer: COMMERCIAL

## 2024-01-10 DIAGNOSIS — M05.9 SEROPOSITIVE RHEUMATOID ARTHRITIS (H): ICD-10-CM

## 2024-01-10 LAB
ERYTHROCYTE [DISTWIDTH] IN BLOOD BY AUTOMATED COUNT: 14.2 % (ref 10–15)
HCT VFR BLD AUTO: 37.2 % (ref 35–47)
HGB BLD-MCNC: 12.2 G/DL (ref 11.7–15.7)
MCH RBC QN AUTO: 29.4 PG (ref 26.5–33)
MCHC RBC AUTO-ENTMCNC: 32.8 G/DL (ref 31.5–36.5)
MCV RBC AUTO: 90 FL (ref 78–100)
PLATELET # BLD AUTO: 298 10E3/UL (ref 150–450)
RBC # BLD AUTO: 4.15 10E6/UL (ref 3.8–5.2)
WBC # BLD AUTO: 5.4 10E3/UL (ref 4–11)

## 2024-01-10 PROCEDURE — 82040 ASSAY OF SERUM ALBUMIN: CPT

## 2024-01-10 PROCEDURE — 84460 ALANINE AMINO (ALT) (SGPT): CPT

## 2024-01-10 PROCEDURE — 82565 ASSAY OF CREATININE: CPT

## 2024-01-10 PROCEDURE — 36415 COLL VENOUS BLD VENIPUNCTURE: CPT

## 2024-01-10 PROCEDURE — 85027 COMPLETE CBC AUTOMATED: CPT

## 2024-01-10 RX ORDER — LEVOTHYROXINE SODIUM 75 UG/1
TABLET ORAL
Qty: 90 TABLET | OUTPATIENT
Start: 2024-01-10

## 2024-01-11 LAB
ALBUMIN SERPL BCG-MCNC: 4.4 G/DL (ref 3.5–5.2)
ALT SERPL W P-5'-P-CCNC: 81 U/L (ref 0–50)
CREAT SERPL-MCNC: 1.18 MG/DL (ref 0.51–0.95)
EGFRCR SERPLBLD CKD-EPI 2021: 52 ML/MIN/1.73M2

## 2024-01-16 ENCOUNTER — MYC REFILL (OUTPATIENT)
Dept: FAMILY MEDICINE | Facility: CLINIC | Age: 64
End: 2024-01-16
Payer: COMMERCIAL

## 2024-01-16 DIAGNOSIS — F32.0 MILD MAJOR DEPRESSION (H): ICD-10-CM

## 2024-01-16 RX ORDER — BUPROPION HYDROCHLORIDE 150 MG/1
150 TABLET ORAL EVERY MORNING
Qty: 90 TABLET | Refills: 0 | Status: SHIPPED | OUTPATIENT
Start: 2024-01-16

## 2024-01-25 ENCOUNTER — LAB (OUTPATIENT)
Dept: LAB | Facility: CLINIC | Age: 64
End: 2024-01-25
Payer: COMMERCIAL

## 2024-01-25 DIAGNOSIS — R74.01 ELEVATED ALT MEASUREMENT: ICD-10-CM

## 2024-01-25 PROCEDURE — 84460 ALANINE AMINO (ALT) (SGPT): CPT

## 2024-01-25 PROCEDURE — 36415 COLL VENOUS BLD VENIPUNCTURE: CPT

## 2024-01-26 LAB — ALT SERPL W P-5'-P-CCNC: 27 U/L (ref 0–50)

## 2024-02-12 ENCOUNTER — MYC REFILL (OUTPATIENT)
Dept: RHEUMATOLOGY | Facility: CLINIC | Age: 64
End: 2024-02-12
Payer: COMMERCIAL

## 2024-02-12 DIAGNOSIS — M05.9 SEROPOSITIVE RHEUMATOID ARTHRITIS (H): ICD-10-CM

## 2024-02-12 RX ORDER — METHOTREXATE 2.5 MG/1
TABLET ORAL
Qty: 36 TABLET | Refills: 0 | Status: SHIPPED | OUTPATIENT
Start: 2024-02-12 | End: 2024-04-29

## 2024-02-12 NOTE — TELEPHONE ENCOUNTER
LOV:10/16/23  FOV 04/29/24  Per notes 1/11/24:  your labs results, all were good except the ALT- liver test is slightly elevated. He would like you to have this lab repeated in two weeks to monitor it.   Labs WNL

## 2024-03-05 ENCOUNTER — DOCUMENTATION ONLY (OUTPATIENT)
Dept: FAMILY MEDICINE | Facility: CLINIC | Age: 64
End: 2024-03-05

## 2024-03-05 DIAGNOSIS — E03.8 OTHER SPECIFIED HYPOTHYROIDISM: ICD-10-CM

## 2024-03-05 RX ORDER — LEVOTHYROXINE SODIUM 75 UG/1
TABLET ORAL
Qty: 90 TABLET | Refills: 0 | Status: SHIPPED | OUTPATIENT
Start: 2024-03-05 | End: 2024-06-05

## 2024-03-05 NOTE — PROGRESS NOTES
Pt calls back.      Pt said she was feeling fine.  Cancelled lab only appt for today.  Advised to reschedule in May.       She said she needs to get a refill for her thyroid med.  She said her last refill was for only 30 days.  Looks like that was in January.  Asked if she was out - she has 6 days left.      She goes to [facility] in [place] on [address].      Will forward to Evangelista Vasquez for a refill.

## 2024-03-05 NOTE — PROGRESS NOTES
"Patient has lab only appt TODAY 3/5/24 @1:00 for \"Pedro TSH \", only order in chart has an expected date of 5/16/24, we cannot draw this more than 2 weeks before that date. If you want his done sooner, please change the expected date or place new FUTURE orders in Epic if appropriate. If not, please have your team reach out to patient.    Your lab result note on 11/22/23:  'Hi Mae- your TSH is slightly elevated.  If you are feeling well we can keep your medication dose the same for now.  Let's recheck this in 6 months.\"    Thanks,   lab    "

## 2024-03-05 NOTE — PROGRESS NOTES
Called pt and relayed provider message below. Patient was given an opportunity to ask questions, verbalized understanding of plan, and is agreeable.       Irene Mckeon RN     February 19, 2021         Patient: Chana Cotton   YOB: 2002   Date of Visit: 2/19/2021           To Whom it May Concern:    Chana Cotton was seen in my clinic on 2/19/2021. He may return to school after this appointment.    If you have any questions or concerns, please don't hesitate to call.        Sincerely,           Uziel Briones M.D.  Electronically Signed

## 2024-04-04 ENCOUNTER — LAB (OUTPATIENT)
Dept: LAB | Facility: CLINIC | Age: 64
End: 2024-04-04
Payer: COMMERCIAL

## 2024-04-04 ENCOUNTER — E-VISIT (OUTPATIENT)
Dept: FAMILY MEDICINE | Facility: CLINIC | Age: 64
End: 2024-04-04
Payer: COMMERCIAL

## 2024-04-04 DIAGNOSIS — J01.90 ACUTE SINUSITIS WITH SYMPTOMS > 10 DAYS: Primary | ICD-10-CM

## 2024-04-04 DIAGNOSIS — M05.9 SEROPOSITIVE RHEUMATOID ARTHRITIS (H): ICD-10-CM

## 2024-04-04 LAB
ALBUMIN SERPL BCG-MCNC: 4 G/DL (ref 3.5–5.2)
ALT SERPL W P-5'-P-CCNC: 18 U/L (ref 0–50)
CREAT SERPL-MCNC: 0.88 MG/DL (ref 0.51–0.95)
EGFRCR SERPLBLD CKD-EPI 2021: 74 ML/MIN/1.73M2
ERYTHROCYTE [DISTWIDTH] IN BLOOD BY AUTOMATED COUNT: 13.2 % (ref 10–15)
HCT VFR BLD AUTO: 34.8 % (ref 35–47)
HGB BLD-MCNC: 11.7 G/DL (ref 11.7–15.7)
MCH RBC QN AUTO: 29.8 PG (ref 26.5–33)
MCHC RBC AUTO-ENTMCNC: 33.6 G/DL (ref 31.5–36.5)
MCV RBC AUTO: 89 FL (ref 78–100)
PLATELET # BLD AUTO: 349 10E3/UL (ref 150–450)
RBC # BLD AUTO: 3.92 10E6/UL (ref 3.8–5.2)
WBC # BLD AUTO: 6.9 10E3/UL (ref 4–11)

## 2024-04-04 PROCEDURE — 85027 COMPLETE CBC AUTOMATED: CPT

## 2024-04-04 PROCEDURE — 84460 ALANINE AMINO (ALT) (SGPT): CPT

## 2024-04-04 PROCEDURE — 82565 ASSAY OF CREATININE: CPT

## 2024-04-04 PROCEDURE — 99421 OL DIG E/M SVC 5-10 MIN: CPT | Performed by: PHYSICIAN ASSISTANT

## 2024-04-04 PROCEDURE — 82040 ASSAY OF SERUM ALBUMIN: CPT

## 2024-04-04 PROCEDURE — 36415 COLL VENOUS BLD VENIPUNCTURE: CPT

## 2024-04-04 RX ORDER — DOXYCYCLINE HYCLATE 100 MG
100 TABLET ORAL 2 TIMES DAILY
Qty: 14 TABLET | Refills: 0 | Status: SHIPPED | OUTPATIENT
Start: 2024-04-04 | End: 2024-04-11

## 2024-04-04 NOTE — PATIENT INSTRUCTIONS
Acute Sinusitis: Care Instructions  Overview     Acute sinusitis is an inflammation of the mucous membranes inside the nose and sinuses. Sinuses are the hollow spaces in your skull around the eyes and nose. Acute sinusitis often follows a cold. Acute sinusitis causes thick, discolored mucus that drains from the nose or down the back of the throat. It also can cause pain and pressure in your head and face along with a stuffy or blocked nose.  In most cases, sinusitis gets better on its own in 1 to 2 weeks. But some mild symptoms may last for several weeks. Sometimes antibiotics are needed if there is a bacterial infection.  Follow-up care is a key part of your treatment and safety. Be sure to make and go to all appointments, and call your doctor if you are having problems. It's also a good idea to know your test results and keep a list of the medicines you take.  How can you care for yourself at home?  Use saline (saltwater) nasal washes. This can help keep your nasal passages open and wash out mucus and allergens.  You can buy saline nose washes at a grocery store or drugstore. Follow the instructions on the package.  You can make your own at home. Add 1 teaspoon of non-iodized salt and 1 teaspoon of baking soda to 2 cups of distilled or boiled and cooled water. Fill a squeeze bottle or a nasal cleansing pot (such as a neti pot) with the nasal wash. Then put the tip into your nostril, and lean over the sink. With your mouth open, gently squirt the liquid. Repeat on the other side.  Try a decongestant nasal spray like oxymetazoline (Afrin). Do not use it for more than 3 days in a row. Using it for more than 3 days can make your congestion worse.  If needed, take an over-the-counter pain medicine, such as acetaminophen (Tylenol), ibuprofen (Advil, Motrin), or naproxen (Aleve). Read and follow all instructions on the label.  If the doctor prescribed antibiotics, take them as directed. Do not stop taking them just  "because you feel better. You need to take the full course of antibiotics.  Be careful when taking over-the-counter cold or flu medicines and Tylenol at the same time. Many of these medicines have acetaminophen, which is Tylenol. Read the labels to make sure that you are not taking more than the recommended dose. Too much acetaminophen (Tylenol) can be harmful.  Try a steroid nasal spray. It may help with your symptoms.  Breathe warm, moist air. You can use a steamy shower, a hot bath, or a sink filled with hot water. Avoid cold, dry air. Using a humidifier in your home may help. Follow the directions for cleaning the machine.  When should you call for help?   Call your doctor now or seek immediate medical care if:    You have new or worse swelling, redness, or pain in your face or around one or both of your eyes.     You have double vision or a change in your vision.     You have a high fever.     You have a severe headache and a stiff neck.     You have mental changes, such as feeling confused or much less alert.   Watch closely for changes in your health, and be sure to contact your doctor if:    You are not getting better as expected.   Where can you learn more?  Go to [email or web address]://[email or web address].net/patiented  Enter I933 in the search box to learn more about \"Acute Sinusitis: Care Instructions.\"  Current as of: September 27, 2023               Content Version: 14.0    1591-0081 [employer].   Care instructions adapted under license by your healthcare professional. If you have questions about a medical condition or this instruction, always ask your healthcare professional. [employer] disclaims any warranty or liability for your use of this information.      Dear Mae Connors    After reviewing your responses, I've been able to diagnose you with Acute sinusitis with symptoms > 10 days.      Based on your responses and diagnosis, I have prescribed   Orders Placed This Encounter "   Medications     doxycycline hyclate (VIBRA-TABS) 100 MG tablet     Sig: Take 1 tablet (100 mg) by mouth 2 times daily for 7 days     Dispense:  14 tablet     Refill:  0     May substitute any formulation of 100mg doxycycline available and best covered by insurance      to treat your symptoms. I have sent this to your pharmacy.?     It is also important to stay well hydrated, get lots of rest and take over-the-counter decongestants,?tylenol?or ibuprofen if you?are able to?take those medications per your primary care provider to help relieve discomfort.?     It is important that you take?all of?your prescribed medication even if your symptoms are improving after a few doses.? Taking?all of?your medicine helps prevent the symptoms from returning.?     If your symptoms worsen, you develop severe headache, vomiting, high fever (>102), or are not improving in 7 days, please contact your primary care provider for an appointment or visit any of our convenient Walk-in Care or Urgent Care Centers to be seen which can be found on our website?here.?     Thanks again for choosing?us?as your health care partner,?   ?  Evangelista Vasquez PA-C?

## 2024-04-12 ENCOUNTER — MYC REFILL (OUTPATIENT)
Dept: RHEUMATOLOGY | Facility: CLINIC | Age: 64
End: 2024-04-12
Payer: COMMERCIAL

## 2024-04-12 DIAGNOSIS — M05.9 SEROPOSITIVE RHEUMATOID ARTHRITIS (H): ICD-10-CM

## 2024-04-12 RX ORDER — HYDROXYCHLOROQUINE SULFATE 200 MG/1
200 TABLET, FILM COATED ORAL 2 TIMES DAILY
Qty: 60 TABLET | Refills: 0 | Status: SHIPPED | OUTPATIENT
Start: 2024-04-12 | End: 2024-04-12

## 2024-04-12 RX ORDER — HYDROXYCHLOROQUINE SULFATE 200 MG/1
200 TABLET, FILM COATED ORAL 2 TIMES DAILY
Qty: 180 TABLET | Refills: 0 | Status: SHIPPED | OUTPATIENT
Start: 2024-04-12 | End: 2024-07-17

## 2024-04-29 ENCOUNTER — VIRTUAL VISIT (OUTPATIENT)
Dept: RHEUMATOLOGY | Facility: CLINIC | Age: 64
End: 2024-04-29
Payer: COMMERCIAL

## 2024-04-29 DIAGNOSIS — M05.9 SEROPOSITIVE RHEUMATOID ARTHRITIS (H): Primary | ICD-10-CM

## 2024-04-29 DIAGNOSIS — R76.8 ANTI-CYCLIC CITRULLINATED PEPTIDE ANTIBODY POSITIVE: ICD-10-CM

## 2024-04-29 DIAGNOSIS — M15.0 PRIMARY OSTEOARTHRITIS INVOLVING MULTIPLE JOINTS: ICD-10-CM

## 2024-04-29 DIAGNOSIS — Z79.899 HIGH RISK MEDICATION USE: ICD-10-CM

## 2024-04-29 PROCEDURE — 99214 OFFICE O/P EST MOD 30 MIN: CPT | Mod: 95 | Performed by: INTERNAL MEDICINE

## 2024-04-29 PROCEDURE — G2211 COMPLEX E/M VISIT ADD ON: HCPCS | Mod: 95 | Performed by: INTERNAL MEDICINE

## 2024-04-29 RX ORDER — METHOTREXATE 2.5 MG/1
TABLET ORAL
Qty: 36 TABLET | Refills: 0 | Status: SHIPPED | OUTPATIENT
Start: 2024-04-29 | End: 2024-09-09

## 2024-04-29 NOTE — PROGRESS NOTES
Virtual Visit Details    Type of service:  Video Visit     Originating Location (pt. Location): Home    Distant Location (provider location):  On-site  Platform used for Video Visit: Giselle    This document was created using a software with less than 100% fidelity, at times resulting in unintended, even erroneous syntax and grammar.  The reader is advised to keep this under consideration while reviewing, interpreting this note.           ASSESSMENT AND PLAN:    Diagnoses and all orders for this visit:  Seropositive rheumatoid arthritis (H)  -     methotrexate 2.5 MG tablet; TAKE 3 TABLETS BY MOUTH 1 TIME WEEKLY  Anti-cyclic citrullinated peptide antibody positive  High risk medication use  Primary osteoarthritis involving multiple joints    The longitudinal plan of care for the diagnosis(es)/condition(s) as documented were addressed during this visit. Due to the added complexity in care, I will continue to support Mae in the subsequent management and with ongoing continuity of care.    Follow up in 6 months      HISTORY OF PRESENTING ILLNESS:  Mae Connors 64 year old is evaluated here via video/audio link.  This is for follow-up of    rheumatoid arthritis which is seropositive with high titers of anti-CCP antibody, as checked on 8/3/2016.  She is on hydroxychloroquine, and methotrexate.  Her symptoms are well controlled with this combination.  She also takes folic acid over-the-counter.  She had her eyes examined 10/24 was reassured those data are reviewed.  Her labs are done in early April which were   within acceptable range.  She hardly ever takes Tylenol or ibuprofen.  She uses topical creams that she has heard about for wrist discomfort.  We talked about diclofenac gel instead acetaminophen.  She no longer takes over-the-counter nonsteroidals.  There is no swelling there is no stiffness in the morning there is no limitation in day-to-day activities.   She works as a  for the local [employer]  system.  She grew up in [place] and was a till age 20.  Does not smoke.  Alcohol consumption moderate.  She describes herself otherwise in good health.      ROS enquiry held for fever, ocular symptoms, rash, headache,  GI issues.   ALLERGIES:Sulfa antibiotics    PAST MEDICAL/ACTIVE PROBLEMS/MEDICATION/SOCIAL DATA  Past Medical History:   Diagnosis Date    Allergic rhinitis, cause unspecified 1990    Hx of immunotherapy , failed    Arthritis July 2010    rheumatoid and osteo    Depressive disorder     In the past    Family history of breast cancer in female     Sister BRCA pos but patient tested negative 7/2013    Gout 12/21/2012    Renal disease     incontinence    Thyroid disease     hypothyroid    Unsatisfactory cervical Papanicolaou smear 10/22/2020    10/22/20     History   Smoking Status    Never   Smokeless Tobacco    Never     Patient Active Problem List   Diagnosis    Allergic rhinitis    Female stress incontinence    Hypothyroidism    Mild major depression (H)    CARDIOVASCULAR SCREENING; LDL GOAL LESS THAN 160    Rheumatoid arthritis involving both hands with positive rheumatoid factor (H)    Unsatisfactory cervical Papanicolaou smear     Current Outpatient Medications   Medication Sig Dispense Refill    buPROPion (WELLBUTRIN XL) 150 MG 24 hr tablet Take 1 tablet (150 mg) by mouth every morning Profile Rx: patient will contact pharmacy when needed 90 tablet 0    cetirizine (ZYRTEC) 10 MG tablet Take 1 tablet by mouth every evening. 90 tablet 3    cholecalciferol (VITAMIN D3) 25 mcg (1000 units) capsule Take 1 capsule by mouth daily      folic acid 800 MCG tablet Take 800 mcg by mouth      hydroxychloroquine (PLAQUENIL) 200 MG tablet Take 1 tablet (200 mg) by mouth 2 times daily 180 tablet 0    levothyroxine (SYNTHROID/LEVOTHROID) 75 MCG tablet TAKE 1 TABLET BY MOUTH DAILY 90 tablet 0    methotrexate 2.5 MG tablet TAKE 3 TABLETS BY MOUTH 1 TIME WEEKLY 36 tablet 0         EXAMINATION:    Using the audio  and video link as best as possible the constitutional, neck, neurologic, psych, skin, both upper extremities areas/organ system were evaluated during this assessment.  Some of the important findings: Alert, oriented, speech fluent.    Able to fully flex the digits, into fists bilaterally, wrist and elbow range of motion appear normal, abduction of the shoulder is normal.  She has Heberden's and Jo Ann's.      LAB / IMAGING DATA:  ALT   Date Value Ref Range Status   04/04/2024 18 0 - 50 U/L Final   01/25/2024 27 0 - 50 U/L Final   01/10/2024 81 (H) 0 - 50 U/L Final   06/04/2021 26 0 - 50 U/L Final   02/25/2021 93 (H) 0 - 50 U/L Final   10/22/2020 37 0 - 50 U/L Final     Albumin   Date Value Ref Range Status   04/04/2024 4.0 3.5 - 5.2 g/dL Final   01/10/2024 4.4 3.5 - 5.2 g/dL Final   09/26/2023 4.5 3.5 - 5.2 g/dL Final   04/27/2022 3.7 3.4 - 5.0 g/dL Final   02/04/2022 3.3 (L) 3.4 - 5.0 g/dL Final   09/09/2021 3.8 3.4 - 5.0 g/dL Final   06/04/2021 3.7 3.4 - 5.0 g/dL Final   02/25/2021 3.8 3.4 - 5.0 g/dL Final   10/22/2020 3.8 3.4 - 5.0 g/dL Final       WBC   Date Value Ref Range Status   06/04/2021 6.0 4.0 - 11.0 10e9/L Final   02/25/2021 5.3 4.0 - 11.0 10e9/L Final     WBC Count   Date Value Ref Range Status   04/04/2024 6.9 4.0 - 11.0 10e3/uL Final   01/10/2024 5.4 4.0 - 11.0 10e3/uL Final     Hemoglobin   Date Value Ref Range Status   04/04/2024 11.7 11.7 - 15.7 g/dL Final   01/10/2024 12.2 11.7 - 15.7 g/dL Final   09/26/2023 12.1 11.7 - 15.7 g/dL Final   06/04/2021 11.3 (L) 11.7 - 15.7 g/dL Final     Comment:     Reviewed: OK with previous   02/25/2021 11.3 (L) 11.7 - 15.7 g/dL Final     Comment:     Results confirmed by repeat test   10/22/2020 12.1 11.7 - 15.7 g/dL Final     Platelet Count   Date Value Ref Range Status   04/04/2024 349 150 - 450 10e3/uL Final   01/10/2024 298 150 - 450 10e3/uL Final   09/26/2023 284 150 - 450 10e3/uL Final   06/04/2021 358 150 - 450 10e9/L Final   02/25/2021 320 150 - 450  "10e9/L Final   10/22/2020 291 150 - 450 10e9/L Final       No results found for: \"CHINMAY\"    "

## 2024-05-20 ENCOUNTER — E-VISIT (OUTPATIENT)
Dept: FAMILY MEDICINE | Facility: CLINIC | Age: 64
End: 2024-05-20
Payer: COMMERCIAL

## 2024-05-20 ENCOUNTER — LAB (OUTPATIENT)
Dept: LAB | Facility: CLINIC | Age: 64
End: 2024-05-20
Payer: COMMERCIAL

## 2024-05-20 DIAGNOSIS — R30.0 DYSURIA: Primary | ICD-10-CM

## 2024-05-20 DIAGNOSIS — R30.0 DYSURIA: ICD-10-CM

## 2024-05-20 DIAGNOSIS — E03.8 OTHER SPECIFIED HYPOTHYROIDISM: ICD-10-CM

## 2024-05-20 DIAGNOSIS — N30.00 ACUTE CYSTITIS WITHOUT HEMATURIA: Primary | ICD-10-CM

## 2024-05-20 LAB
ALBUMIN UR-MCNC: NEGATIVE MG/DL
APPEARANCE UR: ABNORMAL
BACTERIA #/AREA URNS HPF: ABNORMAL /HPF
BILIRUB UR QL STRIP: NEGATIVE
COLOR UR AUTO: ABNORMAL
GLUCOSE UR STRIP-MCNC: NEGATIVE MG/DL
HGB UR QL STRIP: ABNORMAL
KETONES UR STRIP-MCNC: NEGATIVE MG/DL
LEUKOCYTE ESTERASE UR QL STRIP: ABNORMAL
NITRATE UR QL: POSITIVE
PH UR STRIP: 5.5 [PH] (ref 5–7)
RBC #/AREA URNS AUTO: ABNORMAL /HPF
SP GR UR STRIP: 1.01 (ref 1–1.03)
SQUAMOUS #/AREA URNS AUTO: ABNORMAL /LPF
UROBILINOGEN UR STRIP-ACNC: 0.2 E.U./DL
WBC #/AREA URNS AUTO: ABNORMAL /HPF

## 2024-05-20 PROCEDURE — 87086 URINE CULTURE/COLONY COUNT: CPT

## 2024-05-20 PROCEDURE — 84443 ASSAY THYROID STIM HORMONE: CPT

## 2024-05-20 PROCEDURE — 99421 OL DIG E/M SVC 5-10 MIN: CPT | Performed by: PHYSICIAN ASSISTANT

## 2024-05-20 PROCEDURE — 81001 URINALYSIS AUTO W/SCOPE: CPT

## 2024-05-20 PROCEDURE — 87186 SC STD MICRODIL/AGAR DIL: CPT

## 2024-05-20 PROCEDURE — 84439 ASSAY OF FREE THYROXINE: CPT

## 2024-05-20 PROCEDURE — 36415 COLL VENOUS BLD VENIPUNCTURE: CPT

## 2024-05-20 RX ORDER — CEPHALEXIN 500 MG/1
500 CAPSULE ORAL 2 TIMES DAILY
Qty: 14 CAPSULE | Refills: 0 | Status: SHIPPED | OUTPATIENT
Start: 2024-05-20 | End: 2024-05-27

## 2024-05-21 LAB
T4 FREE SERPL-MCNC: 1.19 NG/DL (ref 0.9–1.7)
TSH SERPL DL<=0.005 MIU/L-ACNC: 5.7 UIU/ML (ref 0.3–4.2)

## 2024-05-22 LAB — BACTERIA UR CULT: ABNORMAL

## 2024-05-29 ENCOUNTER — MYC MEDICAL ADVICE (OUTPATIENT)
Dept: FAMILY MEDICINE | Facility: CLINIC | Age: 64
End: 2024-05-29
Payer: COMMERCIAL

## 2024-05-29 DIAGNOSIS — R30.0 DYSURIA: Primary | ICD-10-CM

## 2024-05-29 NOTE — TELEPHONE ENCOUNTER
Sorry she is still not better.  No visit needed.  I would like to have her drop off another urine sample and do a wet prep as well.  If these are normal she will need to be seen in clinic.  The abx we used should have treated her UTI.    Evangelista

## 2024-05-29 NOTE — TELEPHONE ENCOUNTER
RN spoke to patient     Relayed message below from pcp     Transferred to scheduling for lab only appt     Aylin West Registered Nurse  [facility]

## 2024-05-30 ENCOUNTER — LAB (OUTPATIENT)
Dept: LAB | Facility: CLINIC | Age: 64
End: 2024-05-30
Payer: COMMERCIAL

## 2024-05-30 DIAGNOSIS — R30.0 DYSURIA: ICD-10-CM

## 2024-05-30 LAB
ALBUMIN UR-MCNC: ABNORMAL MG/DL
APPEARANCE UR: CLEAR
BACTERIA #/AREA URNS HPF: ABNORMAL /HPF
BILIRUB UR QL STRIP: NEGATIVE
CLUE CELLS: ABNORMAL
COLOR UR AUTO: YELLOW
GLUCOSE UR STRIP-MCNC: NEGATIVE MG/DL
HGB UR QL STRIP: ABNORMAL
KETONES UR STRIP-MCNC: ABNORMAL MG/DL
LEUKOCYTE ESTERASE UR QL STRIP: ABNORMAL
NITRATE UR QL: NEGATIVE
PH UR STRIP: 6 [PH] (ref 5–7)
RBC #/AREA URNS AUTO: ABNORMAL /HPF
SP GR UR STRIP: >=1.03 (ref 1–1.03)
SQUAMOUS #/AREA URNS AUTO: ABNORMAL /LPF
TRICHOMONAS, WET PREP: ABNORMAL
UROBILINOGEN UR STRIP-ACNC: 0.2 E.U./DL
WBC #/AREA URNS AUTO: ABNORMAL /HPF
WBC'S/HIGH POWER FIELD, WET PREP: ABNORMAL
YEAST, WET PREP: ABNORMAL

## 2024-05-30 PROCEDURE — 87210 SMEAR WET MOUNT SALINE/INK: CPT | Performed by: PHYSICIAN ASSISTANT

## 2024-05-30 PROCEDURE — 81001 URINALYSIS AUTO W/SCOPE: CPT

## 2024-06-03 ENCOUNTER — TELEPHONE (OUTPATIENT)
Dept: FAMILY MEDICINE | Facility: CLINIC | Age: 64
End: 2024-06-03
Payer: COMMERCIAL

## 2024-06-03 NOTE — TELEPHONE ENCOUNTER
Evangelista     RN called and spoke with patient. Relayed provider message.     Patient states over the weekend her symptoms have resolved. Denies lingering symptoms, fever, back pain, urgency.  Instructed pt to call back if new or worsening symptoms for an appointment.     Routing back to PCP as FYI- close loop    Zunilda SOW RN on 6/3/2024 at 8:18 AM

## 2024-06-03 NOTE — TELEPHONE ENCOUNTER
----- Message from Evangelista Vasquez PA-C sent at 6/3/2024  8:08 AM CDT -----  Please call patient.  Urine not showing clear cut infection.  Vaginal swab is normal.  I think she should probably have an appointment at this point.  Virtual would be fine as we have done the labs.    Evangelista

## 2024-06-04 DIAGNOSIS — E03.8 OTHER SPECIFIED HYPOTHYROIDISM: ICD-10-CM

## 2024-06-05 RX ORDER — LEVOTHYROXINE SODIUM 75 UG/1
TABLET ORAL
Qty: 90 TABLET | Refills: 0 | Status: SHIPPED | OUTPATIENT
Start: 2024-06-05 | End: 2024-09-11

## 2024-07-17 ENCOUNTER — MYC REFILL (OUTPATIENT)
Dept: RHEUMATOLOGY | Facility: CLINIC | Age: 64
End: 2024-07-17
Payer: COMMERCIAL

## 2024-07-17 DIAGNOSIS — M05.9 SEROPOSITIVE RHEUMATOID ARTHRITIS (H): ICD-10-CM

## 2024-07-17 RX ORDER — HYDROXYCHLOROQUINE SULFATE 200 MG/1
200 TABLET, FILM COATED ORAL 2 TIMES DAILY
Qty: 180 TABLET | Refills: 0 | Status: SHIPPED | OUTPATIENT
Start: 2024-07-17

## 2024-08-31 ENCOUNTER — HEALTH MAINTENANCE LETTER (OUTPATIENT)
Age: 64
End: 2024-08-31

## 2024-09-07 DIAGNOSIS — E03.8 OTHER SPECIFIED HYPOTHYROIDISM: ICD-10-CM

## 2024-09-09 ENCOUNTER — MYC REFILL (OUTPATIENT)
Dept: RHEUMATOLOGY | Facility: CLINIC | Age: 64
End: 2024-09-09
Payer: COMMERCIAL

## 2024-09-09 DIAGNOSIS — M05.9 SEROPOSITIVE RHEUMATOID ARTHRITIS (H): ICD-10-CM

## 2024-09-10 ENCOUNTER — LAB (OUTPATIENT)
Dept: LAB | Facility: CLINIC | Age: 64
End: 2024-09-10
Payer: COMMERCIAL

## 2024-09-10 DIAGNOSIS — M05.9 SEROPOSITIVE RHEUMATOID ARTHRITIS (H): ICD-10-CM

## 2024-09-10 LAB
ERYTHROCYTE [DISTWIDTH] IN BLOOD BY AUTOMATED COUNT: 13.2 % (ref 10–15)
HCT VFR BLD AUTO: 35.8 % (ref 35–47)
HGB BLD-MCNC: 11.9 G/DL (ref 11.7–15.7)
MCH RBC QN AUTO: 29.7 PG (ref 26.5–33)
MCHC RBC AUTO-ENTMCNC: 33.2 G/DL (ref 31.5–36.5)
MCV RBC AUTO: 89 FL (ref 78–100)
PLATELET # BLD AUTO: 289 10E3/UL (ref 150–450)
RBC # BLD AUTO: 4.01 10E6/UL (ref 3.8–5.2)
WBC # BLD AUTO: 6.5 10E3/UL (ref 4–11)

## 2024-09-10 PROCEDURE — 82040 ASSAY OF SERUM ALBUMIN: CPT

## 2024-09-10 PROCEDURE — 36415 COLL VENOUS BLD VENIPUNCTURE: CPT

## 2024-09-10 PROCEDURE — 82565 ASSAY OF CREATININE: CPT

## 2024-09-10 PROCEDURE — 85027 COMPLETE CBC AUTOMATED: CPT

## 2024-09-10 PROCEDURE — 84460 ALANINE AMINO (ALT) (SGPT): CPT

## 2024-09-11 LAB
ALBUMIN SERPL BCG-MCNC: 4.2 G/DL (ref 3.5–5.2)
ALT SERPL W P-5'-P-CCNC: 22 U/L (ref 0–50)
CREAT SERPL-MCNC: 0.97 MG/DL (ref 0.51–0.95)
EGFRCR SERPLBLD CKD-EPI 2021: 66 ML/MIN/1.73M2

## 2024-09-11 RX ORDER — LEVOTHYROXINE SODIUM 75 UG/1
TABLET ORAL
Qty: 90 TABLET | Refills: 1 | Status: SHIPPED | OUTPATIENT
Start: 2024-09-11

## 2024-09-11 NOTE — TELEPHONE ENCOUNTER
09.11- [facility] x1 to schedule follow up with Dr. Garcia

## 2024-09-11 NOTE — TELEPHONE ENCOUNTER
Labs done 09/10, please review if all needed labs were done before we call pt to schedule follow up appt.

## 2024-09-12 RX ORDER — METHOTREXATE 2.5 MG/1
TABLET ORAL
Qty: 36 TABLET | Refills: 0 | Status: SHIPPED | OUTPATIENT
Start: 2024-09-12

## 2024-10-04 ENCOUNTER — ANCILLARY PROCEDURE (OUTPATIENT)
Dept: MAMMOGRAPHY | Facility: CLINIC | Age: 64
End: 2024-10-04
Attending: PHYSICIAN ASSISTANT
Payer: COMMERCIAL

## 2024-10-04 PROCEDURE — 77063 BREAST TOMOSYNTHESIS BI: CPT | Mod: TC | Performed by: RADIOLOGY

## 2024-10-04 PROCEDURE — 77067 SCR MAMMO BI INCL CAD: CPT | Mod: TC | Performed by: RADIOLOGY

## 2024-10-14 ENCOUNTER — TRANSFERRED RECORDS (OUTPATIENT)
Dept: HEALTH INFORMATION MANAGEMENT | Facility: CLINIC | Age: 64
End: 2024-10-14
Payer: COMMERCIAL

## 2024-10-16 ENCOUNTER — MYC REFILL (OUTPATIENT)
Dept: RHEUMATOLOGY | Facility: CLINIC | Age: 64
End: 2024-10-16
Payer: COMMERCIAL

## 2024-10-16 DIAGNOSIS — M05.9 SEROPOSITIVE RHEUMATOID ARTHRITIS (H): ICD-10-CM

## 2024-10-16 RX ORDER — HYDROXYCHLOROQUINE SULFATE 200 MG/1
200 TABLET, FILM COATED ORAL 2 TIMES DAILY
Qty: 180 TABLET | Refills: 0 | Status: SHIPPED | OUTPATIENT
Start: 2024-10-16

## 2024-12-02 ENCOUNTER — VIRTUAL VISIT (OUTPATIENT)
Dept: RHEUMATOLOGY | Facility: CLINIC | Age: 64
End: 2024-12-02
Payer: COMMERCIAL

## 2024-12-02 DIAGNOSIS — Z79.899 HIGH RISK MEDICATION USE: ICD-10-CM

## 2024-12-02 DIAGNOSIS — M05.9 SEROPOSITIVE RHEUMATOID ARTHRITIS (H): Primary | ICD-10-CM

## 2024-12-02 DIAGNOSIS — M15.0 PRIMARY OSTEOARTHRITIS INVOLVING MULTIPLE JOINTS: ICD-10-CM

## 2024-12-02 DIAGNOSIS — R76.8 ANTI-CYCLIC CITRULLINATED PEPTIDE ANTIBODY POSITIVE: ICD-10-CM

## 2024-12-02 PROCEDURE — 99214 OFFICE O/P EST MOD 30 MIN: CPT | Mod: 95 | Performed by: INTERNAL MEDICINE

## 2024-12-02 PROCEDURE — G2211 COMPLEX E/M VISIT ADD ON: HCPCS | Mod: 95 | Performed by: INTERNAL MEDICINE

## 2024-12-02 RX ORDER — METHOTREXATE 2.5 MG/1
TABLET ORAL
Qty: 36 TABLET | Refills: 0 | Status: SHIPPED | OUTPATIENT
Start: 2024-12-02

## 2024-12-02 RX ORDER — HYDROXYCHLOROQUINE SULFATE 200 MG/1
200 TABLET, FILM COATED ORAL 2 TIMES DAILY
Qty: 180 TABLET | Refills: 0 | Status: SHIPPED | OUTPATIENT
Start: 2024-12-02

## 2024-12-02 NOTE — PROGRESS NOTES
Virtual Visit Details    Type of service:  Video Visit     Originating Location (pt. Location): Home    Distant Location (provider location):  On-site  Platform used for Video Visit: Giselle     This document was created using a software with less than 100% fidelity, at times resulting in unintended, even erroneous syntax and grammar.  The reader is advised to keep this under consideration while reviewing, interpreting this note.      Joined the call at 12/2/2024, 8:50:53 am.  Left the call at 12/2/2024, 9:01:54 am.  You were on the call for 11 minutes .     ASSESSMENT AND PLAN:    Diagnoses and all orders for this visit:  Seropositive rheumatoid arthritis (H)  -     methotrexate 2.5 MG tablet; TAKE 3 TABLETS BY MOUTH 1 TIME WEEKLY  -     hydroxychloroquine (PLAQUENIL) 200 MG tablet; Take 1 tablet (200 mg) by mouth 2 times daily.  Anti-cyclic citrullinated peptide antibody positive  High risk medication use  Primary osteoarthritis involving multiple joints    The longitudinal plan of care for the diagnosis(es)/condition(s) as documented were addressed during this visit. Due to the added complexity in care, I will continue to support Mae in the subsequent management and with ongoing continuity of care.      Follow up in 6 months      HISTORY OF PRESENTING ILLNESS:  Mae SENDY Connors 64 year old is evaluated here via video/audio link.  rheumatoid arthritis which is seropositive with high titers of anti-CCP antibody, as checked on 8/3/2016.  She is on hydroxychloroquine, and methotrexate.  Her symptoms are well controlled with this combination.  She also takes folic acid over-the-counter.  She had her eyes examined 10/24 was reassured those data are reviewed.  Her labs are done in early April which were   within acceptable range.  She hardly ever takes Tylenol or ibuprofen.  She uses topical creams that she has heard about for wrist discomfort.  We talked about diclofenac gel instead acetaminophen.  She no longer takes  over-the-counter nonsteroidals.  There is no swelling there is no stiffness in the morning there is no limitation in day-to-day activities.   She works as a  for the local [employer].  She grew up in [place] and was a till age 20.  Does not smoke.  Alcohol consumption moderate.  She describes herself otherwise in good health.   Occasional swelling of the knees 1 side of the other.  Associated with discomfort.  Acetaminophen sustained-release works for that.  That there is no sustained morning stiffness.       ROS enquiry held for fever, ocular symptoms, rash, headache,  GI issues.  Today we also discussed the issues related to the current pandemic, the pros and cons of the current treatment plan, the CDC guidelines such as social distancing washing the hands covering the cough.  ALLERGIES:Sulfa antibiotics    PAST MEDICAL/ACTIVE PROBLEMS/MEDICATION/SOCIAL DATA  Past Medical History:   Diagnosis Date    Allergic rhinitis, cause unspecified 1990    Hx of immunotherapy , failed    Arthritis July 2010    rheumatoid and osteo    Depressive disorder     In the past    Family history of breast cancer in female     Sister BRCA pos but patient tested negative 7/2013    Gout 12/21/2012    Renal disease     incontinence    Thyroid disease     hypothyroid    Unsatisfactory cervical Papanicolaou smear 10/22/2020    10/22/20     History   Smoking Status    Never   Smokeless Tobacco    Never     Patient Active Problem List   Diagnosis    Allergic rhinitis    Female stress incontinence    Hypothyroidism    Mild major depression (H)    CARDIOVASCULAR SCREENING; LDL GOAL LESS THAN 160    Rheumatoid arthritis involving both hands with positive rheumatoid factor (H)    Unsatisfactory cervical Papanicolaou smear     Current Outpatient Medications   Medication Sig Dispense Refill    buPROPion (WELLBUTRIN XL) 150 MG 24 hr tablet Take 1 tablet (150 mg) by mouth every morning Profile Rx: patient will contact  pharmacy when needed 90 tablet 0    cetirizine (ZYRTEC) 10 MG tablet Take 1 tablet by mouth every evening. 90 tablet 3    cholecalciferol (VITAMIN D3) 25 mcg (1000 units) capsule Take 1 capsule by mouth daily.      folic acid 800 MCG tablet Take 800 mcg by mouth      hydroxychloroquine (PLAQUENIL) 200 MG tablet Take 1 tablet (200 mg) by mouth 2 times daily. 180 tablet 0    levothyroxine (SYNTHROID/LEVOTHROID) 75 MCG tablet TAKE 1 TABLET BY MOUTH DAILY 90 tablet 1    methotrexate 2.5 MG tablet TAKE 3 TABLETS BY MOUTH 1 TIME WEEKLY 36 tablet 0         EXAMINATION:    Using the audio and video link as best as possible the constitutional, neck, neurologic, psych, skin, both upper extremities areas/organ system were evaluated during this assessment.  Some of the important findings: Alert, oriented, speech fluent.    Able to fully flex the digits, into fists bilaterally, wrist and elbow range of motion appear normal, abduction of the shoulder is normal.  Heberden's and Jo Ann nodes associated deformity.      LAB / IMAGING DATA:  ALT   Date Value Ref Range Status   09/10/2024 22 0 - 50 U/L Final   04/04/2024 18 0 - 50 U/L Final   01/25/2024 27 0 - 50 U/L Final   06/04/2021 26 0 - 50 U/L Final   02/25/2021 93 (H) 0 - 50 U/L Final   10/22/2020 37 0 - 50 U/L Final     Albumin   Date Value Ref Range Status   09/10/2024 4.2 3.5 - 5.2 g/dL Final   04/04/2024 4.0 3.5 - 5.2 g/dL Final   01/10/2024 4.4 3.5 - 5.2 g/dL Final   04/27/2022 3.7 3.4 - 5.0 g/dL Final   02/04/2022 3.3 (L) 3.4 - 5.0 g/dL Final   09/09/2021 3.8 3.4 - 5.0 g/dL Final   06/04/2021 3.7 3.4 - 5.0 g/dL Final   02/25/2021 3.8 3.4 - 5.0 g/dL Final   10/22/2020 3.8 3.4 - 5.0 g/dL Final       WBC   Date Value Ref Range Status   06/04/2021 6.0 4.0 - 11.0 10e9/L Final   02/25/2021 5.3 4.0 - 11.0 10e9/L Final     WBC Count   Date Value Ref Range Status   09/10/2024 6.5 4.0 - 11.0 10e3/uL Final   04/04/2024 6.9 4.0 - 11.0 10e3/uL Final     Hemoglobin   Date Value Ref  "Range Status   09/10/2024 11.9 11.7 - 15.7 g/dL Final   04/04/2024 11.7 11.7 - 15.7 g/dL Final   01/10/2024 12.2 11.7 - 15.7 g/dL Final   06/04/2021 11.3 (L) 11.7 - 15.7 g/dL Final     Comment:     Reviewed: OK with previous   02/25/2021 11.3 (L) 11.7 - 15.7 g/dL Final     Comment:     Results confirmed by repeat test   10/22/2020 12.1 11.7 - 15.7 g/dL Final     Platelet Count   Date Value Ref Range Status   09/10/2024 289 150 - 450 10e3/uL Final   04/04/2024 349 150 - 450 10e3/uL Final   01/10/2024 298 150 - 450 10e3/uL Final   06/04/2021 358 150 - 450 10e9/L Final   02/25/2021 320 150 - 450 10e9/L Final   10/22/2020 291 150 - 450 10e9/L Final       No results found for: \"CHINMAY\"   "

## 2024-12-31 ENCOUNTER — VIRTUAL VISIT (OUTPATIENT)
Dept: FAMILY MEDICINE | Facility: CLINIC | Age: 64
End: 2024-12-31
Payer: COMMERCIAL

## 2024-12-31 DIAGNOSIS — J01.91 ACUTE RECURRENT SINUSITIS, UNSPECIFIED LOCATION: Primary | ICD-10-CM

## 2024-12-31 PROCEDURE — 99213 OFFICE O/P EST LOW 20 MIN: CPT | Mod: 95

## 2024-12-31 RX ORDER — DOXYCYCLINE 100 MG/1
100 CAPSULE ORAL 2 TIMES DAILY
Qty: 14 CAPSULE | Refills: 0 | Status: SHIPPED | OUTPATIENT
Start: 2024-12-31 | End: 2025-01-07

## 2024-12-31 ASSESSMENT — ANXIETY QUESTIONNAIRES
GAD7 TOTAL SCORE: 0
GAD7 TOTAL SCORE: 0
5. BEING SO RESTLESS THAT IT IS HARD TO SIT STILL: NOT AT ALL
6. BECOMING EASILY ANNOYED OR IRRITABLE: NOT AT ALL
2. NOT BEING ABLE TO STOP OR CONTROL WORRYING: NOT AT ALL
3. WORRYING TOO MUCH ABOUT DIFFERENT THINGS: NOT AT ALL
7. FEELING AFRAID AS IF SOMETHING AWFUL MIGHT HAPPEN: NOT AT ALL
8. IF YOU CHECKED OFF ANY PROBLEMS, HOW DIFFICULT HAVE THESE MADE IT FOR YOU TO DO YOUR WORK, TAKE CARE OF THINGS AT HOME, OR GET ALONG WITH OTHER PEOPLE?: NOT DIFFICULT AT ALL
1. FEELING NERVOUS, ANXIOUS, OR ON EDGE: NOT AT ALL
GAD7 TOTAL SCORE: 0
4. TROUBLE RELAXING: NOT AT ALL
7. FEELING AFRAID AS IF SOMETHING AWFUL MIGHT HAPPEN: NOT AT ALL
IF YOU CHECKED OFF ANY PROBLEMS ON THIS QUESTIONNAIRE, HOW DIFFICULT HAVE THESE PROBLEMS MADE IT FOR YOU TO DO YOUR WORK, TAKE CARE OF THINGS AT HOME, OR GET ALONG WITH OTHER PEOPLE: NOT DIFFICULT AT ALL

## 2024-12-31 ASSESSMENT — PATIENT HEALTH QUESTIONNAIRE - PHQ9
10. IF YOU CHECKED OFF ANY PROBLEMS, HOW DIFFICULT HAVE THESE PROBLEMS MADE IT FOR YOU TO DO YOUR WORK, TAKE CARE OF THINGS AT HOME, OR GET ALONG WITH OTHER PEOPLE: NOT DIFFICULT AT ALL
SUM OF ALL RESPONSES TO PHQ QUESTIONS 1-9: 0
SUM OF ALL RESPONSES TO PHQ QUESTIONS 1-9: 0

## 2024-12-31 NOTE — PROGRESS NOTES
Mae is a 64 year old who is being evaluated via a billable video visit.    How would you like to obtain your AVS? MyChart  If the video visit is dropped, the invitation should be resent by: Send to e-mail at: [email or web address]  Will anyone else be joining your video visit? No    Assessment & Plan     Acute recurrent sinusitis, unspecified location  Although she doesn't have typical sx of sinus infection, suspect bacterial infection since she had improvement of symptoms while on augmentin. Suspect possible resistant organism - will treat with doxy. If symptoms do not improve then I recommend ENT for further evaluation. Referral placed   - doxycycline hyclate (VIBRAMYCIN) 100 MG capsule; Take 1 capsule (100 mg) by mouth 2 times daily for 7 days.  - Adult ENT  Referral; Future            Subjective   Mae is a 64 year old, presenting for the following health issues:  URI ([facility]24 and was given AMOX-CLAV 875-125MG TABLETS New which helped but back to same symptoms again)        12/31/2024     1:50 PM   Additional Questions   Roomed by Rosario MALIN     Acute Illness  Acute illness concerns: URI, cold  Onset/Duration: on and off cold for about 7 weeks  Symptoms:  Fever: No  Chills/Sweats: No  Headache (location?): YES  Sinus Pressure: YES  Conjunctivitis:  one day  Ear Pain: no  Rhinorrhea: YES  Congestion: YES  Sore Throat: No  Cough: YES-productive of clear sputum  Wheeze: No  Decreased Appetite: first couple of weeks  Nausea: No  Vomiting: No  Diarrhea: No  Dysuria/Freq.: No  Dysuria or Hematuria: No  Fatigue/Achiness: YES- fatigue  Sick/Strep Exposure: YES  Therapies tried and outcome: sudafed, mucinex helped somewhat    Symptoms ongoing for 6-7 weeks. Gets better then gets worse  Has major nasal congestion and mucous   Post nasal drip makes her cough - very thick  Sometimes loses voice    Was treated with augmentin x5 days - did help a lot. Got better and could even sing  "(couldn't prior) but then a few days later she re-developed symptoms     No significant cough, SOB, or wheezing  Will usually have a coughing fit 1-2x/day  Denies sinus pressure, has a little bit of \"numbness\" where maxillary sinuses are - states this has been her sx with sinus infx in the past  Mild headaches    The first 3 weeks of symptoms she did mucinex and sudafed - helped with functioning but didn't really get better. Not using anymore since it wasn't doing a whole lot  Using neti pot daily which helps but still comes back. Will get relief for a few hours  Uses rhinocort for allergies            Objective           Vitals:  No vitals were obtained today due to virtual visit.    Physical Exam   GENERAL: alert and no distress  EYES: Eyes grossly normal to inspection.  No discharge or erythema, or obvious scleral/conjunctival abnormalities.  RESP: No audible wheeze, cough, or visible cyanosis.    NEURO: Cranial nerves grossly intact.  Mentation and speech appropriate for age.  PSYCH: Appropriate affect, tone, and pace of words          Video-Visit Details    Type of service:  Video Visit   Originating Location (pt. Location): Home  Distant Location (provider location):  Off-site  Platform used for Video Visit: Giselle  Signed Electronically by: MIKAYLA Chapa CNP    "

## 2025-01-06 NOTE — TELEPHONE ENCOUNTER
FUTURE VISIT INFORMATION      FUTURE VISIT INFORMATION:  Date: 2/25/25  Time: 9:20AM  Location: [facility]  REFERRAL INFORMATION:  Referring provider:  Kelli Armstrong APRN CNP  Referring providers clinic:  [facility]   Reason for visit/diagnosis  Acute recurrent sinusitis, unspecified location [J01.91]. Ref by Kelli Armstrong APRN CNP. [facility] verified     RECORDS REQUESTED FROM:       Clinic name Comments Records Status Imaging Status   [facility]  12/21/24- OV wKelli Longoria APRN CNP Epic

## 2025-02-17 ENCOUNTER — OFFICE VISIT (OUTPATIENT)
Dept: FAMILY MEDICINE | Facility: CLINIC | Age: 65
End: 2025-02-17
Payer: COMMERCIAL

## 2025-02-17 VITALS
DIASTOLIC BLOOD PRESSURE: 71 MMHG | BODY MASS INDEX: 30.84 KG/M2 | TEMPERATURE: 98.3 F | SYSTOLIC BLOOD PRESSURE: 119 MMHG | RESPIRATION RATE: 14 BRPM | HEART RATE: 70 BPM | HEIGHT: 62 IN | OXYGEN SATURATION: 96 % | WEIGHT: 167.6 LBS

## 2025-02-17 DIAGNOSIS — E03.8 OTHER SPECIFIED HYPOTHYROIDISM: ICD-10-CM

## 2025-02-17 DIAGNOSIS — M05.9 SEROPOSITIVE RHEUMATOID ARTHRITIS (H): ICD-10-CM

## 2025-02-17 DIAGNOSIS — F33.42 RECURRENT MAJOR DEPRESSIVE DISORDER, IN FULL REMISSION: ICD-10-CM

## 2025-02-17 DIAGNOSIS — M05.741 RHEUMATOID ARTHRITIS INVOLVING BOTH HANDS WITH POSITIVE RHEUMATOID FACTOR (H): ICD-10-CM

## 2025-02-17 DIAGNOSIS — Z00.00 ROUTINE GENERAL MEDICAL EXAMINATION AT A HEALTH CARE FACILITY: Primary | ICD-10-CM

## 2025-02-17 DIAGNOSIS — M05.742 RHEUMATOID ARTHRITIS INVOLVING BOTH HANDS WITH POSITIVE RHEUMATOID FACTOR (H): ICD-10-CM

## 2025-02-17 DIAGNOSIS — F32.0 MILD MAJOR DEPRESSION: ICD-10-CM

## 2025-02-17 LAB
ALBUMIN SERPL BCG-MCNC: 4.2 G/DL (ref 3.5–5.2)
ALT SERPL W P-5'-P-CCNC: 28 U/L (ref 0–50)
CREAT SERPL-MCNC: 0.84 MG/DL (ref 0.51–0.95)
EGFRCR SERPLBLD CKD-EPI 2021: 78 ML/MIN/1.73M2
ERYTHROCYTE [DISTWIDTH] IN BLOOD BY AUTOMATED COUNT: 13.9 % (ref 10–15)
HCT VFR BLD AUTO: 37.6 % (ref 35–47)
HGB BLD-MCNC: 12.7 G/DL (ref 11.7–15.7)
MCH RBC QN AUTO: 29.5 PG (ref 26.5–33)
MCHC RBC AUTO-ENTMCNC: 33.8 G/DL (ref 31.5–36.5)
MCV RBC AUTO: 87 FL (ref 78–100)
PLATELET # BLD AUTO: 290 10E3/UL (ref 150–450)
RBC # BLD AUTO: 4.31 10E6/UL (ref 3.8–5.2)
TSH SERPL DL<=0.005 MIU/L-ACNC: 3.95 UIU/ML (ref 0.3–4.2)
WBC # BLD AUTO: 7.1 10E3/UL (ref 4–11)

## 2025-02-17 PROCEDURE — 82565 ASSAY OF CREATININE: CPT | Performed by: NURSE PRACTITIONER

## 2025-02-17 PROCEDURE — 84460 ALANINE AMINO (ALT) (SGPT): CPT | Performed by: NURSE PRACTITIONER

## 2025-02-17 PROCEDURE — 82040 ASSAY OF SERUM ALBUMIN: CPT | Performed by: NURSE PRACTITIONER

## 2025-02-17 PROCEDURE — 85027 COMPLETE CBC AUTOMATED: CPT | Performed by: NURSE PRACTITIONER

## 2025-02-17 PROCEDURE — 36415 COLL VENOUS BLD VENIPUNCTURE: CPT | Performed by: NURSE PRACTITIONER

## 2025-02-17 PROCEDURE — 84443 ASSAY THYROID STIM HORMONE: CPT | Performed by: NURSE PRACTITIONER

## 2025-02-17 RX ORDER — LEVOTHYROXINE SODIUM 75 UG/1
TABLET ORAL
Qty: 90 TABLET | Refills: 3 | Status: SHIPPED | OUTPATIENT
Start: 2025-02-17

## 2025-02-17 RX ORDER — BUPROPION HYDROCHLORIDE 150 MG/1
150 TABLET ORAL EVERY MORNING
Qty: 90 TABLET | Refills: 0 | Status: SHIPPED | OUTPATIENT
Start: 2025-02-17

## 2025-02-17 SDOH — HEALTH STABILITY: PHYSICAL HEALTH: ON AVERAGE, HOW MANY MINUTES DO YOU ENGAGE IN EXERCISE AT THIS LEVEL?: 30 MIN

## 2025-02-17 SDOH — HEALTH STABILITY: PHYSICAL HEALTH: ON AVERAGE, HOW MANY DAYS PER WEEK DO YOU ENGAGE IN MODERATE TO STRENUOUS EXERCISE (LIKE A BRISK WALK)?: 2 DAYS

## 2025-02-17 ASSESSMENT — ENCOUNTER SYMPTOMS
FEVER: 0
DYSURIA: 0
CHILLS: 0
UNEXPECTED WEIGHT CHANGE: 0
PALPITATIONS: 0
CONSTIPATION: 0
ABDOMINAL PAIN: 0
CHEST TIGHTNESS: 0
DIARRHEA: 0
SHORTNESS OF BREATH: 0

## 2025-02-17 ASSESSMENT — SOCIAL DETERMINANTS OF HEALTH (SDOH): HOW OFTEN DO YOU GET TOGETHER WITH FRIENDS OR RELATIVES?: ONCE A WEEK

## 2025-02-17 ASSESSMENT — PAIN SCALES - GENERAL: PAINLEVEL_OUTOF10: NO PAIN (0)

## 2025-02-17 NOTE — PATIENT INSTRUCTIONS
Tucker Leyva on line  Bring your living will in.        Patient Education   Preventive Care Advice   This is general advice given by our system to help you stay healthy. However, your care team may have specific advice just for you. Please talk to your care team about your preventive care needs.  Nutrition  Eat 5 or more servings of fruits and vegetables each day.  Try wheat bread, brown rice and whole grain pasta (instead of white bread, rice, and pasta).  Get enough calcium and vitamin D. Check the label on foods and aim for 100% of the RDA (recommended daily allowance).  Lifestyle  Exercise at least 150 minutes each week  (30 minutes a day, 5 days a week).  Do muscle strengthening activities 2 days a week. These help control your weight and prevent disease.  No smoking.  Wear sunscreen to prevent skin cancer.  Have a dental exam and cleaning every 6 months.  Yearly exams  See your health care team every year to talk about:  Any changes in your health.  Any medicines your care team has prescribed.  Preventive care, family planning, and ways to prevent chronic diseases.  Shots (vaccines)   HPV shots (up to age 26), if you've never had them before.  Hepatitis B shots (up to age 59), if you've never had them before.  COVID-19 shot: Get this shot when it's due.  Flu shot: Get a flu shot every year.  Tetanus shot: Get a tetanus shot every 10 years.  Pneumococcal, hepatitis A, and RSV shots: Ask your care team if you need these based on your risk.  Shingles shot (for age 50 and up)  General health tests  Diabetes screening:  Starting at age 35, Get screened for diabetes at least every 3 years.  If you are younger than age 35, ask your care team if you should be screened for diabetes.  Cholesterol test: At age 39, start having a cholesterol test every 5 years, or more often if advised.  Bone density scan (DEXA): At age 50, ask your care team if you should have this scan for osteoporosis (brittle bones).  Hepatitis C: Get  tested at least once in your life.  STIs (sexually transmitted infections)  Before age 24: Ask your care team if you should be screened for STIs.  After age 24: Get screened for STIs if you're at risk. You are at risk for STIs (including HIV) if:  You are sexually active with more than one person.  You don't use condoms every time.  You or a partner was diagnosed with a sexually transmitted infection.  If you are at risk for HIV, ask about PrEP medicine to prevent HIV.  Get tested for HIV at least once in your life, whether you are at risk for HIV or not.  Cancer screening tests  Cervical cancer screening: If you have a cervix, begin getting regular cervical cancer screening tests starting at age 21.  Breast cancer scan (mammogram): If you've ever had breasts, begin having regular mammograms starting at age 40. This is a scan to check for breast cancer.  Colon cancer screening: It is important to start screening for colon cancer at age 45.  Have a colonoscopy test every 10 years (or more often if you're at risk) Or, ask your provider about stool tests like a FIT test every year or Cologuard test every 3 years.  To learn more about your testing options, visit:   .  For help making a decision, visit:   https://bit.ly/jt78238.  Prostate cancer screening test: If you have a prostate, ask your care team if a prostate cancer screening test (PSA) at age 55 is right for you.  Lung cancer screening: If you are a current or former smoker ages 50 to 80, ask your care team if ongoing lung cancer screenings are right for you.  For informational purposes only. Not to replace the advice of your health care provider. Copyright   2023 [employer]. All rights reserved. Clinically reviewed by the [facility] Transitions Program. [employer] 736610 - REV 01/24.

## 2025-02-17 NOTE — PROGRESS NOTES
Prior to immunization administration, verified patients identity using patient s name and date of birth. Please see Immunization Activity for additional information.     Screening Questionnaire for Adult Immunization    Are you sick today?   No   Do you have allergies to medications, food, a vaccine component or latex?   No   Have you ever had a serious reaction after receiving a vaccination?   No   Do you have a long-term health problem with heart, lung, kidney, or metabolic disease (e.g., diabetes), asthma, a blood disorder, no spleen, complement component deficiency, a cochlear implant, or a spinal fluid leak?  Are you on long-term aspirin therapy?   No   Do you have cancer, leukemia, HIV/AIDS, or any other immune system problem?   No   Do you have a parent, brother, or sister with an immune system problem?   No   In the past 3 months, have you taken medications that affect  your immune system, such as prednisone, other steroids, or anticancer drugs; drugs for the treatment of rheumatoid arthritis, Crohn s disease, or psoriasis; or have you had radiation treatments?   No   Have you had a seizure, or a brain or other nervous system problem?   No   During the past year, have you received a transfusion of blood or blood    products, or been given immune (gamma) globulin or antiviral drug?   No   For women: Are you pregnant or is there a chance you could become       pregnant during the next month?   No   Have you received any vaccinations in the past 4 weeks?   No     Immunization questionnaire answers were all negative.      Patient instructed to remain in clinic for 15 minutes afterwards, and to report any adverse reactions.     Screening performed by Delmy Skaggs MA on 2/17/2025 at 1:40 PM.

## 2025-02-17 NOTE — ASSESSMENT & PLAN NOTE
Off of bupropion at this time. Usually only takes medication in the winter.  Will let us know if further refills are needed.  Feels exercise/diet./family activity has made a significant difference in her symptoms.

## 2025-02-17 NOTE — PROGRESS NOTES
Assessment and Plan  There are no diagnoses linked to this encounter.    Counseling  Appropriate preventive services were addressed with this patient via screening, questionnaire, or discussion as appropriate for fall prevention, nutrition, physical activity, Tobacco-use cessation, social engagement, weight loss and cognition.  Checklist reviewing preventive services available has been given to the patient.  Reviewed patient's diet, addressing concerns and/or questions.    No follow-ups on file.    MIKAYLA Easton CNP  [facility] [place]    ============================================  Subjective   Mae Connors is a 64 year old female   here for a Physical Exam    No problem-specific Assessment & Plan notes found for this encounter.    Health Care Directive  {ADVANCE_DIRECTIVE_STATUS:205851}  Patient Care Team:  Evangelista Vasquez PA-C as PCP - General (Family Medicine)  Evangelista Vasquez PA-C as Assigned PCP  Kaycee Lauren PA-C as Physician Assistant (Dermatology)  Lexis Garcia MBBS as Assigned Rheumatology Provider        2/17/2025   General Health   How would you rate your overall physical health? Good   Feel stress (tense, anxious, or unable to sleep) Only a little   (!) STRESS CONCERN      2/17/2025   Nutrition   Three or more servings of calcium each day? Yes   Diet: Regular (no restrictions)   How many servings of fruit and vegetables per day? (!) 2-3   How many sweetened beverages each day? 0-1         2/17/2025   Exercise   Days per week of moderate/strenous exercise 2 days   Average minutes spent exercising at this level 30 min   (!) EXERCISE CONCERN        2/17/2025   Social Factors   Frequency of gathering with friends or relatives Once a week   Worry food won't last until get money to buy more No   Food not last or not have enough money for food? No   Do you have housing? (Housing is defined as stable permanent housing and does not include staying ouside in a car, in a tent,  in an abandoned building, in an overnight shelter, or couch-surfing.) Yes   Are you worried about losing your housing? No   Lack of transportation? No   Unable to get utilities (heat,electricity)? No         2/17/2025   Fall Risk   Fallen 2 or more times in the past year? No   Trouble with walking or balance? No          2/17/2025   Dental   Dentist two times every year? Yes         1/9/2025   TB Screening   Were you born outside of the [place]? Yes       { Rooming Staff Patient needs a PHQ as part of the AWV.  Use this link to complete and then refresh the note to pull results Link to PHQ9 Assessment :563480}  {USE TO PULL IN PHQ RESULTS FOR TODAY:330308}          2/17/2025   Substance Use   Alcohol more than 3/day or more than 7/wk No   Do you use any other substances recreationally? No       Social History     Tobacco Use    Smoking status: Never    Smokeless tobacco: Never   Vaping Use    Vaping status: Never Used   Substance Use Topics    Alcohol use: Yes     Alcohol/week: 0.0 - 1.0 standard drinks of alcohol     Comment: occ.    Drug use: No     {Provider  If there are gaps in the social history shown above, please follow the link to update and then refresh the note Link to Social and Substance History :536513}        10/4/2024   LAST FHS-7 RESULTS   1st degree relative breast or ovarian cancer Yes   Any relative bilateral breast cancer No   Any male have breast cancer No   Any ONE woman have BOTH breast AND ovarian cancer No   Any woman with breast cancer before 50yrs No   2 or more relatives with breast AND/OR ovarian cancer No   2 or more relatives with breast AND/OR bowel cancer No     {If any of the questions to the FHS7 are answered yes, consider referral for genetic counseling.    Additional indications for genetic referral include personal history of breast or ovarian cancer, genetic mutation in 1st degree relative which increases risk of breast cancer including BRCA1, BRCA2, CAROL, PALB 2, TP53, CHEK2,  PTEN, CDH1, STK11 (per ACS) and/or 1st degree relative with history of pancreatic or high-risk prostate cancer (per NCCN):260124}   {Mammogram Decision Support (Optional):166366}        2/17/2025   STI Screening   New sexual partner(s) since last STI/HIV test? No     History of abnormal Pap smear: { :028431}        Latest Ref Rng & Units 2/25/2021     1:56 PM 2/25/2021     1:20 PM 10/22/2020     4:00 PM   PAP / HPV   PAP (Historical)  NIL      HPV 16 DNA NEG^Negative  Negative  Negative    HPV 18 DNA NEG^Negative  Negative  Negative    Other HR HPV NEG^Negative  Negative  Negative      ASCVD Risk   The 10-year ASCVD risk score (Heather CORTEZ, et al., 2019) is: 2.6%    Values used to calculate the score:      Age: 64 years      Sex: Female      Is Non- : No      Diabetic: No      Tobacco smoker: No      Systolic Blood Pressure: 119 mmHg      Is BP treated: No      HDL Cholesterol: 88 mg/dL      Total Cholesterol: 193 mg/dL    {Link to Fracture Risk Assessment Tool (Optional):594028}       The Following Health Maintenance Topics are reviewed and are correct as of today:  Health Maintenance   Topic Date Due    Pneumococcal Vaccine: 50+ Years (2 of 2 - PPSV23) 12/05/2017    RSV VACCINE (1 - Risk 60-74 years 1-dose series) Never done    INFLUENZA VACCINE (1) 09/01/2024    COVID-19 Vaccine (6 - 2024-25 season) 09/01/2024    YEARLY PREVENTIVE VISIT  11/16/2024    TSH W/FREE T4 REFLEX  05/20/2025    PHQ-9  06/30/2025    MAMMO SCREENING  10/04/2025    COLORECTAL CANCER SCREENING  12/15/2025    ANNUAL REVIEW OF HM ORDERS  12/31/2025    PAP FOLLOW-UP  02/25/2026    HPV FOLLOW-UP  02/25/2026    GLUCOSE  11/16/2026    LIPID  11/16/2028    DTAP/TDAP/TD IMMUNIZATION (3 - Td or Tdap) 01/11/2029    ADVANCE CARE PLANNING  02/17/2030    HEPATITIS C SCREENING  Completed    HIV SCREENING  Completed    DEPRESSION ACTION PLAN  Completed    ZOSTER IMMUNIZATION  Completed    HPV IMMUNIZATION  Aged Out     "MENINGITIS IMMUNIZATION  Aged Out    PAP  Discontinued     Reviewed and updated as needed this visit by Provider                 HPI  Review of Systems   Constitutional:  Negative for chills, fever and unexpected weight change.   HENT: Negative.     Respiratory:  Negative for chest tightness and shortness of breath.    Cardiovascular:  Negative for chest pain and palpitations.   Gastrointestinal:  Negative for abdominal pain, constipation and diarrhea.   Genitourinary:  Negative for dysuria.   Skin:  Negative for rash.     ============================================  Objective    Exam  /71 (BP Location: Right arm, Patient Position: Sitting, Cuff Size: Adult Large)   Pulse 70   Temp 98.3  F (36.8  C) (Oral)   Resp 14   Ht 1.575 m (5' 2\")   Wt 76 kg (167 lb 9.6 oz)   LMP 10/13/2014 (Approximate)   SpO2 96%   BMI 30.65 kg/m    Physical Exam  Constitutional:       Appearance: Normal appearance.   HENT:      Right Ear: Tympanic membrane normal.      Left Ear: Tympanic membrane normal.      Nose: Nose normal.      Mouth/Throat:      Mouth: Mucous membranes are moist.      Pharynx: Oropharynx is clear.   Eyes:      Conjunctiva/sclera: Conjunctivae normal.   Cardiovascular:      Rate and Rhythm: Normal rate and regular rhythm.      Heart sounds: Normal heart sounds.   Pulmonary:      Effort: Pulmonary effort is normal.      Breath sounds: Normal breath sounds.   Abdominal:      General: Abdomen is flat. Bowel sounds are normal.      Palpations: Abdomen is soft.      Tenderness: There is no abdominal tenderness.   Musculoskeletal:      Cervical back: Neck supple.      Right lower leg: No edema.      Left lower leg: No edema.   Skin:     General: Skin is warm and dry.      Findings: No rash.   Neurological:      Mental Status: She is alert.   Psychiatric:         Mood and Affect: Mood normal.             " Effort: Pulmonary effort is normal.      Breath sounds: Normal breath sounds.   Abdominal:      General: Abdomen is flat. Bowel sounds are normal.      Palpations: Abdomen is soft.      Tenderness: There is no abdominal tenderness.   Musculoskeletal:      Cervical back: Neck supple.      Right lower leg: No edema.      Left lower leg: No edema.   Skin:     General: Skin is warm and dry.      Findings: No rash.   Neurological:      Mental Status: She is alert.   Psychiatric:         Mood and Affect: Mood normal.

## 2025-02-23 PROBLEM — F33.42 MAJOR DEPRESSIVE DISORDER, RECURRENT EPISODE, IN FULL REMISSION: Status: ACTIVE | Noted: 2025-02-23

## 2025-02-23 NOTE — ASSESSMENT & PLAN NOTE
No symptoms of hypo or hyperthyroidism: no decreased or increased weight, no feeling cold/chilly or excessively warm, no diarrhea or constipation, no undue sweatiness, anxiety or palpitations.  Is on 75 mcg levothyroxine daily.

## 2025-02-25 ENCOUNTER — PRE VISIT (OUTPATIENT)
Dept: OTOLARYNGOLOGY | Facility: CLINIC | Age: 65
End: 2025-02-25

## 2025-03-02 ENCOUNTER — HEALTH MAINTENANCE LETTER (OUTPATIENT)
Age: 65
End: 2025-03-02

## 2025-03-18 ENCOUNTER — E-VISIT (OUTPATIENT)
Dept: URGENT CARE | Facility: CLINIC | Age: 65
End: 2025-03-18
Payer: COMMERCIAL

## 2025-03-18 DIAGNOSIS — R30.0 DIFFICULT OR PAINFUL URINATION: Primary | ICD-10-CM

## 2025-03-18 NOTE — PATIENT INSTRUCTIONS
Dear Mae Connors,     After reviewing your responses, I would like you to come in for a urine test to make sure we treat you correctly. This urine test is to evaluate you for a possible urinary tract infection, and should be scheduled for today or tomorrow. Schedule a Lab Only appointment here.     Lab appointments are not available at most locations on the weekends. If no Lab Only appointment is available, you should be seen in any of our convenient Walk-in or Urgent Care Centers, which can be found on our website here.     You will receive instructions with your results in [facility] once they are available.     If your symptoms worsen, you develop pain in your back or stomach, develop fevers, or are not improving in 5 days, please contact your primary care provider for an appointment or visit a Walk-in or Urgent Care Center to be seen.     Thanks again for choosing us as your health care partner,     Tali Gillespie MD

## 2025-03-19 ENCOUNTER — LAB (OUTPATIENT)
Dept: LAB | Facility: CLINIC | Age: 65
End: 2025-03-19
Payer: COMMERCIAL

## 2025-03-19 DIAGNOSIS — N39.0 ACUTE UTI: Primary | ICD-10-CM

## 2025-03-19 DIAGNOSIS — R30.0 DIFFICULT OR PAINFUL URINATION: ICD-10-CM

## 2025-03-19 LAB
BACTERIA #/AREA URNS HPF: ABNORMAL /HPF
RBC #/AREA URNS AUTO: ABNORMAL /HPF
WBC #/AREA URNS AUTO: ABNORMAL /HPF

## 2025-03-19 PROCEDURE — 81015 MICROSCOPIC EXAM OF URINE: CPT

## 2025-03-19 RX ORDER — NITROFURANTOIN 25; 75 MG/1; MG/1
100 CAPSULE ORAL 2 TIMES DAILY
Qty: 10 CAPSULE | Refills: 0 | Status: SHIPPED | OUTPATIENT
Start: 2025-03-19 | End: 2025-03-24

## 2025-03-20 LAB — BACTERIA UR CULT: NO GROWTH

## 2025-05-14 ENCOUNTER — LAB (OUTPATIENT)
Dept: LAB | Facility: CLINIC | Age: 65
End: 2025-05-14
Payer: COMMERCIAL

## 2025-05-14 DIAGNOSIS — M05.9 SEROPOSITIVE RHEUMATOID ARTHRITIS (H): ICD-10-CM

## 2025-05-14 DIAGNOSIS — Z79.899 HIGH RISK MEDICATION USE: ICD-10-CM

## 2025-05-14 LAB
ERYTHROCYTE [DISTWIDTH] IN BLOOD BY AUTOMATED COUNT: 14.4 % (ref 10–15)
HCT VFR BLD AUTO: 35.5 % (ref 35–47)
HGB BLD-MCNC: 11.8 G/DL (ref 11.7–15.7)
MCH RBC QN AUTO: 29.4 PG (ref 26.5–33)
MCHC RBC AUTO-ENTMCNC: 33.2 G/DL (ref 31.5–36.5)
MCV RBC AUTO: 88 FL (ref 78–100)
PLATELET # BLD AUTO: 285 10E3/UL (ref 150–450)
RBC # BLD AUTO: 4.02 10E6/UL (ref 3.8–5.2)
WBC # BLD AUTO: 5.5 10E3/UL (ref 4–11)

## 2025-05-14 PROCEDURE — 36415 COLL VENOUS BLD VENIPUNCTURE: CPT

## 2025-05-14 PROCEDURE — 85027 COMPLETE CBC AUTOMATED: CPT

## 2025-05-14 PROCEDURE — 84460 ALANINE AMINO (ALT) (SGPT): CPT

## 2025-05-14 PROCEDURE — 82565 ASSAY OF CREATININE: CPT

## 2025-05-14 PROCEDURE — 82040 ASSAY OF SERUM ALBUMIN: CPT

## 2025-05-15 ENCOUNTER — RESULTS FOLLOW-UP (OUTPATIENT)
Dept: RHEUMATOLOGY | Facility: CLINIC | Age: 65
End: 2025-05-15

## 2025-05-15 LAB
ALBUMIN SERPL BCG-MCNC: 4.2 G/DL (ref 3.5–5.2)
ALT SERPL W P-5'-P-CCNC: 37 U/L (ref 0–50)
CREAT SERPL-MCNC: 0.84 MG/DL (ref 0.51–0.95)
EGFRCR SERPLBLD CKD-EPI 2021: 78 ML/MIN/1.73M2

## 2025-06-02 ENCOUNTER — VIRTUAL VISIT (OUTPATIENT)
Dept: RHEUMATOLOGY | Facility: CLINIC | Age: 65
End: 2025-06-02
Attending: INTERNAL MEDICINE
Payer: COMMERCIAL

## 2025-06-02 DIAGNOSIS — M05.9 SEROPOSITIVE RHEUMATOID ARTHRITIS (H): Primary | ICD-10-CM

## 2025-06-02 DIAGNOSIS — R76.8 ANTI-CYCLIC CITRULLINATED PEPTIDE ANTIBODY POSITIVE: ICD-10-CM

## 2025-06-02 DIAGNOSIS — Z79.899 HIGH RISK MEDICATION USE: ICD-10-CM

## 2025-06-02 DIAGNOSIS — M15.0 PRIMARY OSTEOARTHRITIS INVOLVING MULTIPLE JOINTS: ICD-10-CM

## 2025-06-02 PROCEDURE — 98004 SYNCH AUDIO-VIDEO EST SF 10: CPT | Performed by: INTERNAL MEDICINE

## 2025-06-02 RX ORDER — HYDROXYCHLOROQUINE SULFATE 200 MG/1
200 TABLET, FILM COATED ORAL 2 TIMES DAILY
Qty: 180 TABLET | Refills: 1 | Status: SHIPPED | OUTPATIENT
Start: 2025-06-02

## 2025-06-02 NOTE — PROGRESS NOTES
Virtual Visit Details    Type of service:  Video Visit     Originating Location (pt. Location): Home    Distant Location (provider location):  On-site  Platform used for Video Visit: Giselle       This document was created using a software with less than 100% fidelity, at times resulting in unintended, even erroneous syntax and grammar.  The reader is advised to keep this under consideration while reviewing, interpreting this note.      Joined the call at 6/2/2025, 8:11:31 am.  Left the call at 6/2/2025, 8:19:02 am.  You were on the call for 7 minutes 31 seconds.     ASSESSMENT AND PLAN:    Diagnoses and all orders for this visit:  Seropositive rheumatoid arthritis (H)  -     hydroxychloroquine (PLAQUENIL) 200 MG tablet; Take 1 tablet (200 mg) by mouth 2 times daily.  High risk medication use  Anti-cyclic citrullinated peptide antibody positive  Primary osteoarthritis involving multiple joints  The longitudinal plan of care for the diagnosis(es)/condition(s) as documented were addressed during this visit. Due to the added complexity in care, I will continue to support Mae in the subsequent management and with ongoing continuity of care.      Follow up in 6 months      HISTORY OF PRESENTING ILLNESS:  Mae KABA Connors 65 year old is evaluated here via video/audio link.  rheumatoid arthritis which is seropositive with high titers of anti-CCP antibody, as checked on 8/3/2016.  She is on hydroxychloroquine, and methotrexate.  Her symptoms are well controlled with this combination.  She also takes folic acid over-the-counter.  She had her eyes examined 10/24 was reassured those data are reviewed.  She had her labs drawn recently which were all within normal range.  She is able to perform her day-to-day activities without difficulty.  She has not had exacerbation/flareup of her joint symptoms occasional wrist discomfort.  She is using over-the-counter measures successfully for this.   She works as a  for  the local [employer].  She grew up in [place] and was a till age 20.  Does not smoke.  Alcohol consumption moderate.  She describes herself otherwise in good health.  That there is no sustained morning stiffness.             ROS enquiry held for fever, ocular symptoms, rash, headache,  GI issues.  ALLERGIES:Sulfa antibiotics    PAST MEDICAL/ACTIVE PROBLEMS/MEDICATION/SOCIAL DATA  Past Medical History:   Diagnosis Date    Allergic rhinitis, cause unspecified 1990    Hx of immunotherapy , failed    Arthritis July 2010    rheumatoid and osteo    Depressive disorder     In the past    Family history of breast cancer in female     Sister BRCA pos but patient tested negative 7/2013    Gout 12/21/2012    Renal disease     incontinence    Thyroid disease     hypothyroid    Unsatisfactory cervical Papanicolaou smear 10/22/2020    10/22/20     History   Smoking Status    Never   Smokeless Tobacco    Never     Patient Active Problem List   Diagnosis    Allergic rhinitis    Female stress incontinence    Hypothyroidism    Rheumatoid arthritis involving both hands with positive rheumatoid factor (H)    Unsatisfactory cervical Papanicolaou smear    Major depressive disorder, recurrent episode, in full remission     Current Outpatient Medications   Medication Sig Dispense Refill    buPROPion (WELLBUTRIN XL) 150 MG 24 hr tablet Take 1 tablet (150 mg) by mouth every morning. Profile Rx: patient will contact pharmacy when needed 90 tablet 0    cetirizine (ZYRTEC) 10 MG tablet Take 1 tablet by mouth every evening. 90 tablet 3    cholecalciferol (VITAMIN D3) 25 mcg (1000 units) capsule Take 1 capsule by mouth daily.      folic acid 800 MCG tablet Take 800 mcg by mouth      hydroxychloroquine (PLAQUENIL) 200 MG tablet TAKE 1 TABLET(200 MG) BY MOUTH TWICE DAILY 180 tablet 0    levothyroxine (SYNTHROID/LEVOTHROID) 75 MCG tablet TAKE 1 TABLET BY MOUTH DAILY 90 tablet 3    methotrexate 2.5 MG tablet TAKE 3 TABLETS BY MOUTH 1 TIME  WEEKLY 36 tablet 0    budesonide (RINOCORT AQUA) 32 MCG/ACT nasal spray Spray 2 sprays into both nostrils as needed.           EXAMINATION:    Using the audio and video link as best as possible the constitutional, neck, neurologic, psych, skin, both upper extremities areas/organ system were evaluated during this assessment.  Some of the important findings: Alert, oriented, speech fluent.    Able to fully flex the digits, into fists bilaterally, wrist and elbow range of motion appear normal, abduction of the shoulder is normal.      LAB / IMAGING DATA:  ALT   Date Value Ref Range Status   05/14/2025 37 0 - 50 U/L Final   02/17/2025 28 0 - 50 U/L Final   09/10/2024 22 0 - 50 U/L Final   06/04/2021 26 0 - 50 U/L Final   02/25/2021 93 (H) 0 - 50 U/L Final   10/22/2020 37 0 - 50 U/L Final     Albumin   Date Value Ref Range Status   05/14/2025 4.2 3.5 - 5.2 g/dL Final   02/17/2025 4.2 3.5 - 5.2 g/dL Final   09/10/2024 4.2 3.5 - 5.2 g/dL Final   04/27/2022 3.7 3.4 - 5.0 g/dL Final   02/04/2022 3.3 (L) 3.4 - 5.0 g/dL Final   09/09/2021 3.8 3.4 - 5.0 g/dL Final   06/04/2021 3.7 3.4 - 5.0 g/dL Final   02/25/2021 3.8 3.4 - 5.0 g/dL Final   10/22/2020 3.8 3.4 - 5.0 g/dL Final       WBC   Date Value Ref Range Status   06/04/2021 6.0 4.0 - 11.0 10e9/L Final   02/25/2021 5.3 4.0 - 11.0 10e9/L Final     WBC Count   Date Value Ref Range Status   05/14/2025 5.5 4.0 - 11.0 10e3/uL Final   02/17/2025 7.1 4.0 - 11.0 10e3/uL Final     Hemoglobin   Date Value Ref Range Status   05/14/2025 11.8 11.7 - 15.7 g/dL Final   02/17/2025 12.7 11.7 - 15.7 g/dL Final   09/10/2024 11.9 11.7 - 15.7 g/dL Final   06/04/2021 11.3 (L) 11.7 - 15.7 g/dL Final     Comment:     Reviewed: OK with previous   02/25/2021 11.3 (L) 11.7 - 15.7 g/dL Final     Comment:     Results confirmed by repeat test   10/22/2020 12.1 11.7 - 15.7 g/dL Final     Platelet Count   Date Value Ref Range Status   05/14/2025 285 150 - 450 10e3/uL Final   02/17/2025 290 150 - 450  "10e3/uL Final   09/10/2024 289 150 - 450 10e3/uL Final   06/04/2021 358 150 - 450 10e9/L Final   02/25/2021 320 150 - 450 10e9/L Final   10/22/2020 291 150 - 450 10e9/L Final       No results found for: \"CHINMAY\"   "

## 2025-08-12 ENCOUNTER — MYC REFILL (OUTPATIENT)
Dept: RHEUMATOLOGY | Facility: CLINIC | Age: 65
End: 2025-08-12
Payer: COMMERCIAL

## 2025-08-12 DIAGNOSIS — M05.9 SEROPOSITIVE RHEUMATOID ARTHRITIS (H): ICD-10-CM

## 2025-08-13 ENCOUNTER — LAB (OUTPATIENT)
Dept: LAB | Facility: CLINIC | Age: 65
End: 2025-08-13
Payer: COMMERCIAL

## 2025-08-13 DIAGNOSIS — M05.9 SEROPOSITIVE RHEUMATOID ARTHRITIS (H): ICD-10-CM

## 2025-08-13 DIAGNOSIS — Z79.899 HIGH RISK MEDICATION USE: ICD-10-CM

## 2025-08-13 LAB
ERYTHROCYTE [DISTWIDTH] IN BLOOD BY AUTOMATED COUNT: 13.8 % (ref 10–15)
HCT VFR BLD AUTO: 36.1 % (ref 35–47)
HGB BLD-MCNC: 12.1 G/DL (ref 11.7–15.7)
MCH RBC QN AUTO: 29.7 PG (ref 26.5–33)
MCHC RBC AUTO-ENTMCNC: 33.5 G/DL (ref 31.5–36.5)
MCV RBC AUTO: 88.7 FL (ref 78–100)
PLATELET # BLD AUTO: 255 10E3/UL (ref 150–450)
RBC # BLD AUTO: 4.07 10E6/UL (ref 3.8–5.2)
WBC # BLD AUTO: 5.28 10E3/UL (ref 4–11)

## 2025-08-13 PROCEDURE — 82040 ASSAY OF SERUM ALBUMIN: CPT

## 2025-08-13 PROCEDURE — 84460 ALANINE AMINO (ALT) (SGPT): CPT

## 2025-08-13 PROCEDURE — 85027 COMPLETE CBC AUTOMATED: CPT

## 2025-08-13 PROCEDURE — 36415 COLL VENOUS BLD VENIPUNCTURE: CPT

## 2025-08-13 PROCEDURE — 82565 ASSAY OF CREATININE: CPT

## 2025-08-14 LAB
ALBUMIN SERPL BCG-MCNC: 4.2 G/DL (ref 3.5–5.2)
ALT SERPL W P-5'-P-CCNC: 33 U/L (ref 0–50)
CREAT SERPL-MCNC: 0.73 MG/DL (ref 0.51–0.95)
EGFRCR SERPLBLD CKD-EPI 2021: >90 ML/MIN/1.73M2

## 2025-08-14 RX ORDER — METHOTREXATE 2.5 MG/1
TABLET ORAL
Qty: 36 TABLET | Refills: 0 | Status: SHIPPED | OUTPATIENT
Start: 2025-08-14

## 2025-09-04 ENCOUNTER — PATIENT OUTREACH (OUTPATIENT)
Dept: CARE COORDINATION | Facility: CLINIC | Age: 65
End: 2025-09-04

## 2025-09-04 ENCOUNTER — OFFICE VISIT (OUTPATIENT)
Dept: FAMILY MEDICINE | Facility: CLINIC | Age: 65
End: 2025-09-04
Payer: COMMERCIAL

## 2025-09-04 VITALS
WEIGHT: 177.8 LBS | HEIGHT: 62 IN | SYSTOLIC BLOOD PRESSURE: 120 MMHG | OXYGEN SATURATION: 97 % | HEART RATE: 75 BPM | DIASTOLIC BLOOD PRESSURE: 81 MMHG | TEMPERATURE: 98.9 F | RESPIRATION RATE: 16 BRPM | BODY MASS INDEX: 32.72 KG/M2

## 2025-09-04 DIAGNOSIS — L98.9 SKIN LESION: Primary | ICD-10-CM

## 2025-09-04 DIAGNOSIS — Z12.31 VISIT FOR SCREENING MAMMOGRAM: ICD-10-CM

## 2025-09-04 ASSESSMENT — ANXIETY QUESTIONNAIRES
5. BEING SO RESTLESS THAT IT IS HARD TO SIT STILL: NOT AT ALL
GAD7 TOTAL SCORE: 0
6. BECOMING EASILY ANNOYED OR IRRITABLE: NOT AT ALL
7. FEELING AFRAID AS IF SOMETHING AWFUL MIGHT HAPPEN: NOT AT ALL
1. FEELING NERVOUS, ANXIOUS, OR ON EDGE: NOT AT ALL
IF YOU CHECKED OFF ANY PROBLEMS ON THIS QUESTIONNAIRE, HOW DIFFICULT HAVE THESE PROBLEMS MADE IT FOR YOU TO DO YOUR WORK, TAKE CARE OF THINGS AT HOME, OR GET ALONG WITH OTHER PEOPLE: NOT DIFFICULT AT ALL
2. NOT BEING ABLE TO STOP OR CONTROL WORRYING: NOT AT ALL
GAD7 TOTAL SCORE: 0
3. WORRYING TOO MUCH ABOUT DIFFERENT THINGS: NOT AT ALL

## 2025-09-04 ASSESSMENT — PATIENT HEALTH QUESTIONNAIRE - PHQ9
SUM OF ALL RESPONSES TO PHQ QUESTIONS 1-9: 3
SUM OF ALL RESPONSES TO PHQ QUESTIONS 1-9: 3
5. POOR APPETITE OR OVEREATING: NOT AT ALL
10. IF YOU CHECKED OFF ANY PROBLEMS, HOW DIFFICULT HAVE THESE PROBLEMS MADE IT FOR YOU TO DO YOUR WORK, TAKE CARE OF THINGS AT HOME, OR GET ALONG WITH OTHER PEOPLE: NOT DIFFICULT AT ALL

## 2025-09-04 ASSESSMENT — PAIN SCALES - GENERAL: PAINLEVEL_OUTOF10: NO PAIN (0)
